# Patient Record
Sex: FEMALE | Race: ASIAN | NOT HISPANIC OR LATINO | Employment: OTHER | URBAN - METROPOLITAN AREA
[De-identification: names, ages, dates, MRNs, and addresses within clinical notes are randomized per-mention and may not be internally consistent; named-entity substitution may affect disease eponyms.]

---

## 2023-08-15 PROBLEM — R60.0 BILATERAL LEG EDEMA: Status: ACTIVE | Noted: 2023-08-15

## 2023-08-15 PROBLEM — K90.0 ADULT CELIAC DISEASE (HHS-HCC): Status: ACTIVE | Noted: 2023-08-15

## 2023-08-15 PROBLEM — H16.223 KERATITIS SICCA, BILATERAL: Status: ACTIVE | Noted: 2023-08-15

## 2023-08-15 PROBLEM — M35.01 KERATITIS SICCA, BILATERAL (MULTI): Status: ACTIVE | Noted: 2023-08-15

## 2023-08-15 PROBLEM — F17.210 CIGARETTE SMOKER: Status: ACTIVE | Noted: 2023-08-15

## 2023-08-15 PROBLEM — M05.79 RHEUMATOID ARTHRITIS INVOLVING MULTIPLE SITES WITH POSITIVE RHEUMATOID FACTOR (MULTI): Status: ACTIVE | Noted: 2023-08-15

## 2023-08-15 PROBLEM — R53.83 FATIGUE: Status: ACTIVE | Noted: 2023-08-15

## 2023-08-15 RX ORDER — ALBUTEROL SULFATE 90 UG/1
2 AEROSOL, METERED RESPIRATORY (INHALATION) EVERY 4 HOURS PRN
COMMUNITY
Start: 2020-04-08 | End: 2024-03-25 | Stop reason: WASHOUT

## 2023-08-16 ENCOUNTER — OFFICE VISIT (OUTPATIENT)
Dept: PRIMARY CARE | Facility: CLINIC | Age: 68
End: 2023-08-16
Payer: MEDICARE

## 2023-08-16 ENCOUNTER — LAB (OUTPATIENT)
Dept: LAB | Facility: LAB | Age: 68
End: 2023-08-16
Payer: MEDICARE

## 2023-08-16 VITALS
OXYGEN SATURATION: 98 % | WEIGHT: 149.6 LBS | DIASTOLIC BLOOD PRESSURE: 80 MMHG | SYSTOLIC BLOOD PRESSURE: 140 MMHG | HEIGHT: 60 IN | HEART RATE: 75 BPM | TEMPERATURE: 97.9 F | RESPIRATION RATE: 18 BRPM | BODY MASS INDEX: 29.37 KG/M2

## 2023-08-16 DIAGNOSIS — Z78.0 POST-MENOPAUSE: ICD-10-CM

## 2023-08-16 DIAGNOSIS — E78.00 HYPERCHOLESTEROLEMIA: ICD-10-CM

## 2023-08-16 DIAGNOSIS — E21.3 HYPERPARATHYROIDISM (MULTI): ICD-10-CM

## 2023-08-16 DIAGNOSIS — K51.311 ULCERATIVE RECTOSIGMOIDITIS WITH RECTAL BLEEDING (MULTI): ICD-10-CM

## 2023-08-16 DIAGNOSIS — R06.02 SOB (SHORTNESS OF BREATH): ICD-10-CM

## 2023-08-16 DIAGNOSIS — Z12.31 BREAST CANCER SCREENING BY MAMMOGRAM: ICD-10-CM

## 2023-08-16 DIAGNOSIS — I82.4Y9 DEEP VEIN THROMBOSIS (DVT) OF PROXIMAL LOWER EXTREMITY, UNSPECIFIED CHRONICITY, UNSPECIFIED LATERALITY (MULTI): ICD-10-CM

## 2023-08-16 DIAGNOSIS — R20.3 SENSITIVE SKIN: ICD-10-CM

## 2023-08-16 DIAGNOSIS — R73.9 ELEVATED BLOOD SUGAR: ICD-10-CM

## 2023-08-16 DIAGNOSIS — M05.79 RHEUMATOID ARTHRITIS INVOLVING MULTIPLE SITES WITH POSITIVE RHEUMATOID FACTOR (MULTI): ICD-10-CM

## 2023-08-16 DIAGNOSIS — R35.0 FREQUENT URINATION: ICD-10-CM

## 2023-08-16 DIAGNOSIS — I20.89 ANGINA AT REST (CMS-HCC): ICD-10-CM

## 2023-08-16 DIAGNOSIS — Z00.00 MEDICARE ANNUAL WELLNESS VISIT, SUBSEQUENT: Primary | ICD-10-CM

## 2023-08-16 LAB
ALANINE AMINOTRANSFERASE (SGPT) (U/L) IN SER/PLAS: 13 U/L (ref 7–45)
ALBUMIN (G/DL) IN SER/PLAS: 4 G/DL (ref 3.4–5)
ALKALINE PHOSPHATASE (U/L) IN SER/PLAS: 75 U/L (ref 33–136)
ANION GAP IN SER/PLAS: 13 MMOL/L (ref 10–20)
ASPARTATE AMINOTRANSFERASE (SGOT) (U/L) IN SER/PLAS: 16 U/L (ref 9–39)
BASOPHILS (10*3/UL) IN BLOOD BY AUTOMATED COUNT: 0.04 X10E9/L (ref 0–0.1)
BASOPHILS/100 LEUKOCYTES IN BLOOD BY AUTOMATED COUNT: 0.6 % (ref 0–2)
BILIRUBIN TOTAL (MG/DL) IN SER/PLAS: 0.6 MG/DL (ref 0–1.2)
CALCIUM (MG/DL) IN SER/PLAS: 9.4 MG/DL (ref 8.6–10.3)
CARBON DIOXIDE, TOTAL (MMOL/L) IN SER/PLAS: 25 MMOL/L (ref 21–32)
CHLORIDE (MMOL/L) IN SER/PLAS: 103 MMOL/L (ref 98–107)
CHOLESTEROL (MG/DL) IN SER/PLAS: 230 MG/DL (ref 0–199)
CHOLESTEROL IN HDL (MG/DL) IN SER/PLAS: 53.6 MG/DL
CHOLESTEROL/HDL RATIO: 4.3
CREATININE (MG/DL) IN SER/PLAS: 0.8 MG/DL (ref 0.5–1.05)
EOSINOPHILS (10*3/UL) IN BLOOD BY AUTOMATED COUNT: 0.12 X10E9/L (ref 0–0.7)
EOSINOPHILS/100 LEUKOCYTES IN BLOOD BY AUTOMATED COUNT: 1.9 % (ref 0–6)
ERYTHROCYTE DISTRIBUTION WIDTH (RATIO) BY AUTOMATED COUNT: 13.2 % (ref 11.5–14.5)
ERYTHROCYTE MEAN CORPUSCULAR HEMOGLOBIN CONCENTRATION (G/DL) BY AUTOMATED: 32.8 G/DL (ref 32–36)
ERYTHROCYTE MEAN CORPUSCULAR VOLUME (FL) BY AUTOMATED COUNT: 95 FL (ref 80–100)
ERYTHROCYTES (10*6/UL) IN BLOOD BY AUTOMATED COUNT: 4.33 X10E12/L (ref 4–5.2)
ESTIMATED AVERAGE GLUCOSE FOR HBA1C: 114 MG/DL
GFR FEMALE: 80 ML/MIN/1.73M2
GLUCOSE (MG/DL) IN SER/PLAS: 87 MG/DL (ref 74–99)
HEMATOCRIT (%) IN BLOOD BY AUTOMATED COUNT: 41.2 % (ref 36–46)
HEMOGLOBIN (G/DL) IN BLOOD: 13.5 G/DL (ref 12–16)
HEMOGLOBIN A1C/HEMOGLOBIN TOTAL IN BLOOD: 5.6 %
IMMATURE GRANULOCYTES/100 LEUKOCYTES IN BLOOD BY AUTOMATED COUNT: 0.3 % (ref 0–0.9)
LDL: 122 MG/DL (ref 0–99)
LEUKOCYTES (10*3/UL) IN BLOOD BY AUTOMATED COUNT: 6.4 X10E9/L (ref 4.4–11.3)
LYMPHOCYTES (10*3/UL) IN BLOOD BY AUTOMATED COUNT: 2.09 X10E9/L (ref 1.2–4.8)
LYMPHOCYTES/100 LEUKOCYTES IN BLOOD BY AUTOMATED COUNT: 32.8 % (ref 13–44)
MONOCYTES (10*3/UL) IN BLOOD BY AUTOMATED COUNT: 0.53 X10E9/L (ref 0.1–1)
MONOCYTES/100 LEUKOCYTES IN BLOOD BY AUTOMATED COUNT: 8.3 % (ref 2–10)
NEUTROPHILS (10*3/UL) IN BLOOD BY AUTOMATED COUNT: 3.57 X10E9/L (ref 1.2–7.7)
NEUTROPHILS/100 LEUKOCYTES IN BLOOD BY AUTOMATED COUNT: 56.1 % (ref 40–80)
NON HDL CHOLESTEROL: 176 MG/DL
PLATELETS (10*3/UL) IN BLOOD AUTOMATED COUNT: 239 X10E9/L (ref 150–450)
POTASSIUM (MMOL/L) IN SER/PLAS: 4.1 MMOL/L (ref 3.5–5.3)
PROTEIN TOTAL: 7.6 G/DL (ref 6.4–8.2)
SODIUM (MMOL/L) IN SER/PLAS: 137 MMOL/L (ref 136–145)
TRIGLYCERIDE (MG/DL) IN SER/PLAS: 272 MG/DL (ref 0–149)
UREA NITROGEN (MG/DL) IN SER/PLAS: 13 MG/DL (ref 6–23)
VLDL: 54 MG/DL (ref 0–40)

## 2023-08-16 PROCEDURE — 1036F TOBACCO NON-USER: CPT | Performed by: FAMILY MEDICINE

## 2023-08-16 PROCEDURE — 80061 LIPID PANEL: CPT

## 2023-08-16 PROCEDURE — 85025 COMPLETE CBC W/AUTO DIFF WBC: CPT

## 2023-08-16 PROCEDURE — 1159F MED LIST DOCD IN RCRD: CPT | Performed by: FAMILY MEDICINE

## 2023-08-16 PROCEDURE — 99213 OFFICE O/P EST LOW 20 MIN: CPT | Performed by: FAMILY MEDICINE

## 2023-08-16 PROCEDURE — 80053 COMPREHEN METABOLIC PANEL: CPT

## 2023-08-16 PROCEDURE — 1170F FXNL STATUS ASSESSED: CPT | Performed by: FAMILY MEDICINE

## 2023-08-16 PROCEDURE — 83036 HEMOGLOBIN GLYCOSYLATED A1C: CPT

## 2023-08-16 PROCEDURE — 36415 COLL VENOUS BLD VENIPUNCTURE: CPT

## 2023-08-16 PROCEDURE — 1160F RVW MEDS BY RX/DR IN RCRD: CPT | Performed by: FAMILY MEDICINE

## 2023-08-16 PROCEDURE — G0439 PPPS, SUBSEQ VISIT: HCPCS | Performed by: FAMILY MEDICINE

## 2023-08-16 ASSESSMENT — ACTIVITIES OF DAILY LIVING (ADL)
DOING_HOUSEWORK: INDEPENDENT
TAKING_MEDICATION: INDEPENDENT
MANAGING_FINANCES: INDEPENDENT
GROCERY_SHOPPING: INDEPENDENT
DRESSING: INDEPENDENT
BATHING: INDEPENDENT

## 2023-08-16 ASSESSMENT — PATIENT HEALTH QUESTIONNAIRE - PHQ9
2. FEELING DOWN, DEPRESSED OR HOPELESS: NOT AT ALL
SUM OF ALL RESPONSES TO PHQ9 QUESTIONS 1 AND 2: 0
1. LITTLE INTEREST OR PLEASURE IN DOING THINGS: NOT AT ALL

## 2023-08-16 NOTE — PROGRESS NOTES
Subjective   Patient ID: Leola Jennings is a 68 y.o. female who presents for Medicare Annual Wellness Visit Subsequent, Urinary Frequency, tembling, and Shortness of Breath.  HPI    AWV    Also presents in office today for her breathing. Admits that at times she does have a hard time catching her breath. Ongoing for a little while.    Patient admits that she did have covid back in 2020. Admits that since then she has not been able to dye her hair or wear make up. Admits that she will brake out into a rash. Would like to talk about this today.     Also presents in office for trembling. This happened once. Admits that when this was going on she did have her friend check her BP and it was 160/90. Since then it has been normal. Patients in office BP was 140/80.    Also presents in office today for her bladder. Ongoing since this weekend. Admits to increased urination.     Patient reports using inhaler as needed.  Patient reports shortness of breath.  She reports fatigue.    She reports exercising.   Patient reports history of thrombus.    ROS  Constitutional- No activity change. No appetite change.  Eyes- Denies vision changes.  Respiratory- No shortness of breath.  Cardiovascular- No palpitations. No chest pain.  GI- No nausea or vomiting. No diarrhea or constipation. Denies abdominal pain.  Musculoskeletal- Denies joint swelling.  Extremities- No edema.  Neurological- Denies headaches. Denies dizziness.  Skin- No rashes.  Psychiatric/Behavioral- Denies significant anxiety, or depressed mood.     Objective     /80   Pulse 75   Temp 36.6 °C (97.9 °F) (Temporal)   Resp 18   Ht 1.524 m (5')   Wt 67.9 kg (149 lb 9.6 oz)   SpO2 98%   BMI 29.22 kg/m²     Allergies   Allergen Reactions    Gluten Other    Mesalamine Other       Constitutional-- Well-nourished.  No distress  Head- unremarkable.  1  Eyes- PERRL.  Conjunctiva normal.  Nose- Normal.  No rhinorrhea noted.  Throat- Oropharynx is clear and  moist.  Neck- Supple with no thyromegaly.  No significant cervical adenopathy noted.  Pulmonary/Chest- Breath sounds normal with normal effort.  No wheezing.  Heart- Regular rate and rhythm.  No murmur.  Abdomen- Soft and non-tender.  No masses noted.  Musculoskeletal- Normal ROM.  No significant joint swelling  Extremities- No edema.   Neurological- Alert.  No noted deficits.  Skin- Warm.  No rashes.  Psychiatric/Behavioral- Mood and affect normal.  Behavior normal.     Assessment/Plan   1. Medicare annual wellness visit, subsequent        2. Frequent urination        3. Post-menopause  XR DEXA bone density    XR DEXA bone density      4. Breast cancer screening by mammogram  BI mammo bilateral screening tomosynthesis    BI mammo bilateral screening tomosynthesis      5. SOB (shortness of breath)        6. Sensitive skin        7. Ulcerative rectosigmoiditis with rectal bleeding (CMS/HCC)  CBC and Auto Differential    Comprehensive Metabolic Panel      8. Deep vein thrombosis (DVT) of proximal lower extremity, unspecified chronicity, unspecified laterality (CMS/HCC)        9. Hyperparathyroidism (CMS/HCC)        10. Angina at rest (CMS/HCC)        11. Rheumatoid arthritis involving multiple sites with positive rheumatoid factor (CMS/HCC)  CBC and Auto Differential    Comprehensive Metabolic Panel      12. Elevated blood sugar  CBC and Auto Differential    Comprehensive Metabolic Panel    Hemoglobin A1C      13. Hypercholesterolemia  Lipid Panel             Long talk. Treatment options reviewed.  Medicare wellness questionnaire reviewed in detail. Advanced Directives reviewed today. Patient advised to provide the office with a copy if has not already done so. No problems with activities of daily living. Falls risks reviewed.    Shortness of breath.   Frequent urination   Deep vein thrombosis.  Advised patient to wear compression stockings.   Complete blood work after fasting for 10 hours.    Order placed for  Mammogram.    Continue and take your medications as prescribed.    Health Maintenance issues discussed.    Importance of healthy diet and regular exercise regimen discussed.    We will contact you with any test results ordered. If you do not hear from us, please contact.    Follow-up as instructed or sooner if any problems or symptoms do not resolve as expected.    Scribe Attestation  By signing my name below, IGabriella Scribe   attest that this documentation has been prepared under the direction and in the presence of Stewart Mariee MD.

## 2023-12-19 ENCOUNTER — TELEPHONE (OUTPATIENT)
Dept: PRIMARY CARE | Facility: CLINIC | Age: 68
End: 2023-12-19
Payer: MEDICARE

## 2023-12-19 DIAGNOSIS — K51.311 ULCERATIVE RECTOSIGMOIDITIS WITH RECTAL BLEEDING (MULTI): Primary | ICD-10-CM

## 2023-12-19 RX ORDER — CIPROFLOXACIN 250 MG/1
250 TABLET, FILM COATED ORAL 2 TIMES DAILY
Qty: 14 TABLET | Refills: 0 | Status: SHIPPED | OUTPATIENT
Start: 2023-12-19 | End: 2023-12-26

## 2023-12-19 RX ORDER — METHYLPREDNISOLONE 4 MG/1
TABLET ORAL
Qty: 21 TABLET | Refills: 0 | Status: SHIPPED | OUTPATIENT
Start: 2023-12-19 | End: 2023-12-22 | Stop reason: SDUPTHER

## 2023-12-22 DIAGNOSIS — K51.311 ULCERATIVE RECTOSIGMOIDITIS WITH RECTAL BLEEDING (MULTI): ICD-10-CM

## 2023-12-22 RX ORDER — METHYLPREDNISOLONE 4 MG/1
TABLET ORAL
Qty: 21 TABLET | Refills: 0 | Status: SHIPPED | OUTPATIENT
Start: 2023-12-22 | End: 2024-01-03 | Stop reason: SDUPTHER

## 2023-12-22 NOTE — TELEPHONE ENCOUNTER
Please advise patient that Dr. Mariee is out of the office and I am helping to cover.  I have called in a Medrol Dosepak for her.  I would continue to follow Dr. Mariee's advice and if she is not significantly better I would have her call back next week.  She       Patient aware  
Patient is aware.     Stewart Mariee MD  [id number] [place] [id number] Clinical Support Staff10 minutes ago (9:58 AM)       Please let her know this is likely a flareup of her rectosigmoiditis.  I sent in a steroid pack along with an antibiotic.  Take as directed.  Call or follow-up next week if not significantly improved     
Pt calling back, She is still not feeling better. She is almost out of the prednisone that was prescribed on 12/19/23.     She was concerned that she would run out of medication. She wanted to know if another round could be called in?     Please advise     She is aware that Dr. Mariee is out of the office until Tuesday afternoon.   Pharmacy: [facility]  
Pt calling, she wanted to schedule a virtual visit with you. She states that she started having diarrhea about a week and half ago. She is now to the point that she has no control over her bowels. She is wearing depends diapers. Her arthritis has flared up as well, and she cannot make it to the bathroom in time. She states that blood and mucus is coming out.     She states that in the past you called in an antibiotic for this same problem and a steroid for her arthritis.     She has tried everything from the Brat diet to Pepto bismol and nothing seems to help.     I advised the patient that she may need to go to the urgent care.    Please advise    Pharmacy: [facility]  Last office visit: 8/16/23  
No

## 2023-12-30 ENCOUNTER — HOSPITAL ENCOUNTER (EMERGENCY)
Facility: HOSPITAL | Age: 68
Discharge: HOME | End: 2023-12-30
Payer: MEDICARE

## 2023-12-30 ENCOUNTER — APPOINTMENT (OUTPATIENT)
Dept: RADIOLOGY | Facility: HOSPITAL | Age: 68
End: 2023-12-30
Payer: MEDICARE

## 2023-12-30 VITALS
HEIGHT: 60 IN | SYSTOLIC BLOOD PRESSURE: 132 MMHG | OXYGEN SATURATION: 100 % | HEART RATE: 85 BPM | DIASTOLIC BLOOD PRESSURE: 65 MMHG | TEMPERATURE: 97.2 F | WEIGHT: 134 LBS | RESPIRATION RATE: 18 BRPM | BODY MASS INDEX: 26.31 KG/M2

## 2023-12-30 DIAGNOSIS — R19.7 DIARRHEA, UNSPECIFIED TYPE: Primary | ICD-10-CM

## 2023-12-30 LAB
ALBUMIN SERPL BCP-MCNC: 3.8 G/DL (ref 3.4–5)
ALP SERPL-CCNC: 81 U/L (ref 33–136)
ALT SERPL W P-5'-P-CCNC: 15 U/L (ref 7–45)
ANION GAP SERPL CALC-SCNC: 13 MMOL/L (ref 10–20)
AST SERPL W P-5'-P-CCNC: 13 U/L (ref 9–39)
BASOPHILS # BLD AUTO: 0.05 X10*3/UL (ref 0–0.1)
BASOPHILS NFR BLD AUTO: 0.5 %
BILIRUB DIRECT SERPL-MCNC: 0.1 MG/DL (ref 0–0.3)
BILIRUB SERPL-MCNC: 0.5 MG/DL (ref 0–1.2)
BUN SERPL-MCNC: 13 MG/DL (ref 6–23)
C DIF TOX TCDA+TCDB STL QL NAA+PROBE: NOT DETECTED
CALCIUM SERPL-MCNC: 8.5 MG/DL (ref 8.6–10.3)
CHLORIDE SERPL-SCNC: 104 MMOL/L (ref 98–107)
CO2 SERPL-SCNC: 25 MMOL/L (ref 21–32)
CREAT SERPL-MCNC: 0.75 MG/DL (ref 0.5–1.05)
EOSINOPHIL # BLD AUTO: 0.11 X10*3/UL (ref 0–0.7)
EOSINOPHIL NFR BLD AUTO: 1.1 %
ERYTHROCYTE [DISTWIDTH] IN BLOOD BY AUTOMATED COUNT: 15 % (ref 11.5–14.5)
GFR SERPL CREATININE-BSD FRML MDRD: 86 ML/MIN/1.73M*2
GLUCOSE SERPL-MCNC: 91 MG/DL (ref 74–99)
HCT VFR BLD AUTO: 39.3 % (ref 36–46)
HGB BLD-MCNC: 13.1 G/DL (ref 12–16)
IMM GRANULOCYTES # BLD AUTO: 0.09 X10*3/UL (ref 0–0.7)
IMM GRANULOCYTES NFR BLD AUTO: 0.9 % (ref 0–0.9)
INR PPP: 1 (ref 0.9–1.1)
LACTATE SERPL-SCNC: 1.4 MMOL/L (ref 0.4–2)
LIPASE SERPL-CCNC: 48 U/L (ref 9–82)
LYMPHOCYTES # BLD AUTO: 2.39 X10*3/UL (ref 1.2–4.8)
LYMPHOCYTES NFR BLD AUTO: 23.8 %
MCH RBC QN AUTO: 31 PG (ref 26–34)
MCHC RBC AUTO-ENTMCNC: 33.3 G/DL (ref 32–36)
MCV RBC AUTO: 93 FL (ref 80–100)
MONOCYTES # BLD AUTO: 0.87 X10*3/UL (ref 0.1–1)
MONOCYTES NFR BLD AUTO: 8.6 %
NEUTROPHILS # BLD AUTO: 6.55 X10*3/UL (ref 1.2–7.7)
NEUTROPHILS NFR BLD AUTO: 65.1 %
NRBC BLD-RTO: 0 /100 WBCS (ref 0–0)
PLATELET # BLD AUTO: 274 X10*3/UL (ref 150–450)
POTASSIUM SERPL-SCNC: 4 MMOL/L (ref 3.5–5.3)
PROT SERPL-MCNC: 7 G/DL (ref 6.4–8.2)
PROTHROMBIN TIME: 10.9 SECONDS (ref 9.8–12.8)
RBC # BLD AUTO: 4.22 X10*6/UL (ref 4–5.2)
SODIUM SERPL-SCNC: 138 MMOL/L (ref 136–145)
WBC # BLD AUTO: 10.1 X10*3/UL (ref 4.4–11.3)

## 2023-12-30 PROCEDURE — 80076 HEPATIC FUNCTION PANEL: CPT | Performed by: NURSE PRACTITIONER

## 2023-12-30 PROCEDURE — 99284 EMERGENCY DEPT VISIT MOD MDM: CPT

## 2023-12-30 PROCEDURE — 82374 ASSAY BLOOD CARBON DIOXIDE: CPT | Performed by: NURSE PRACTITIONER

## 2023-12-30 PROCEDURE — 2500000001 HC RX 250 WO HCPCS SELF ADMINISTERED DRUGS (ALT 637 FOR MEDICARE OP): Performed by: NURSE PRACTITIONER

## 2023-12-30 PROCEDURE — 83605 ASSAY OF LACTIC ACID: CPT | Performed by: NURSE PRACTITIONER

## 2023-12-30 PROCEDURE — 87493 C DIFF AMPLIFIED PROBE: CPT | Mod: 59 | Performed by: NURSE PRACTITIONER

## 2023-12-30 PROCEDURE — 87506 IADNA-DNA/RNA PROBE TQ 6-11: CPT | Mod: ELYLAB | Performed by: NURSE PRACTITIONER

## 2023-12-30 PROCEDURE — 2500000004 HC RX 250 GENERAL PHARMACY W/ HCPCS (ALT 636 FOR OP/ED): Performed by: NURSE PRACTITIONER

## 2023-12-30 PROCEDURE — 83690 ASSAY OF LIPASE: CPT | Performed by: NURSE PRACTITIONER

## 2023-12-30 PROCEDURE — 85025 COMPLETE CBC W/AUTO DIFF WBC: CPT | Performed by: NURSE PRACTITIONER

## 2023-12-30 PROCEDURE — 74177 CT ABD & PELVIS W/CONTRAST: CPT | Performed by: RADIOLOGY

## 2023-12-30 PROCEDURE — 85610 PROTHROMBIN TIME: CPT | Performed by: NURSE PRACTITIONER

## 2023-12-30 PROCEDURE — 96360 HYDRATION IV INFUSION INIT: CPT | Mod: 59

## 2023-12-30 PROCEDURE — 2550000001 HC RX 255 CONTRASTS: Performed by: NURSE PRACTITIONER

## 2023-12-30 PROCEDURE — 36415 COLL VENOUS BLD VENIPUNCTURE: CPT | Performed by: NURSE PRACTITIONER

## 2023-12-30 PROCEDURE — 74177 CT ABD & PELVIS W/CONTRAST: CPT

## 2023-12-30 RX ORDER — PREDNISONE 20 MG/1
40 TABLET ORAL DAILY
Qty: 10 TABLET | Refills: 0 | Status: SHIPPED | OUTPATIENT
Start: 2023-12-30 | End: 2024-01-04

## 2023-12-30 RX ORDER — DICYCLOMINE HYDROCHLORIDE 10 MG/1
20 CAPSULE ORAL ONCE
Status: COMPLETED | OUTPATIENT
Start: 2023-12-30 | End: 2023-12-30

## 2023-12-30 RX ORDER — DIPHENOXYLATE HYDROCHLORIDE AND ATROPINE SULFATE 2.5; .025 MG/1; MG/1
1 TABLET ORAL 4 TIMES DAILY PRN
Qty: 20 TABLET | Refills: 0 | Status: SHIPPED | OUTPATIENT
Start: 2023-12-30 | End: 2024-03-01 | Stop reason: ALTCHOICE

## 2023-12-30 RX ADMIN — DICYCLOMINE HYDROCHLORIDE 20 MG: 10 CAPSULE ORAL at 11:34

## 2023-12-30 RX ADMIN — IOHEXOL 75 ML: 350 INJECTION, SOLUTION INTRAVENOUS at 15:54

## 2023-12-30 RX ADMIN — SODIUM CHLORIDE 1000 ML: 9 INJECTION, SOLUTION INTRAVENOUS at 11:36

## 2023-12-30 ASSESSMENT — PAIN SCALES - GENERAL
PAINLEVEL_OUTOF10: 0 - NO PAIN
PAINLEVEL_OUTOF10: 8
PAINLEVEL_OUTOF10: 10 - WORST POSSIBLE PAIN
PAINLEVEL_OUTOF10: 0 - NO PAIN
PAINLEVEL_OUTOF10: 10 - WORST POSSIBLE PAIN
PAINLEVEL_OUTOF10: 10 - WORST POSSIBLE PAIN

## 2023-12-30 ASSESSMENT — LIFESTYLE VARIABLES
HAVE YOU EVER FELT YOU SHOULD CUT DOWN ON YOUR DRINKING: NO
EVER HAD A DRINK FIRST THING IN THE MORNING TO STEADY YOUR NERVES TO GET RID OF A HANGOVER: NO
REASON UNABLE TO ASSESS: NO
EVER FELT BAD OR GUILTY ABOUT YOUR DRINKING: NO
HAVE PEOPLE ANNOYED YOU BY CRITICIZING YOUR DRINKING: NO

## 2023-12-30 ASSESSMENT — PAIN - FUNCTIONAL ASSESSMENT
PAIN_FUNCTIONAL_ASSESSMENT: 0-10
PAIN_FUNCTIONAL_ASSESSMENT: 0-10

## 2023-12-30 ASSESSMENT — PAIN DESCRIPTION - ORIENTATION: ORIENTATION: RIGHT;LEFT;LOWER

## 2023-12-30 ASSESSMENT — PAIN DESCRIPTION - DESCRIPTORS: DESCRIPTORS: BURNING;ACHING;CRAMPING

## 2023-12-30 ASSESSMENT — COLUMBIA-SUICIDE SEVERITY RATING SCALE - C-SSRS
1. IN THE PAST MONTH, HAVE YOU WISHED YOU WERE DEAD OR WISHED YOU COULD GO TO SLEEP AND NOT WAKE UP?: NO
2. HAVE YOU ACTUALLY HAD ANY THOUGHTS OF KILLING YOURSELF?: NO
6. HAVE YOU EVER DONE ANYTHING, STARTED TO DO ANYTHING, OR PREPARED TO DO ANYTHING TO END YOUR LIFE?: NO

## 2023-12-30 ASSESSMENT — PAIN DESCRIPTION - FREQUENCY: FREQUENCY: CONSTANT/CONTINUOUS

## 2023-12-30 ASSESSMENT — PAIN DESCRIPTION - PAIN TYPE: TYPE: ACUTE PAIN

## 2023-12-30 ASSESSMENT — PAIN DESCRIPTION - LOCATION: LOCATION: ABDOMEN

## 2023-12-30 NOTE — ED PROVIDER NOTES
HPI   Chief Complaint   Patient presents with    Rectal Bleeding     X1 month, wearing depends/dripping,  10/10 pain, unable to get into PCP until mid Jan       68-year-old female presents emergency department, states she has been having abdominal discomfort, cramping, burning type pain and diarrhea.  Patient states symptoms started on 12/14, Was prescribed Medrol Dosepak by her primary care on 12/18.    Patient states previous similar episodes, notes she has had 2 previous episodes similar to this over the years, last one was approximately 4 years ago.  She has been worked up by GI multiple times.  Ultimately during the last workup she was thought to have ulcerative colitis.  The GI physician wanted to start her on antibiotic logics but she was concerned that the side effects and the strain it would cause in her immune system so she declined.  States she has been doing fine for the last 4 years until this month.    States symptoms are getting worse, describes uncontrollable diarrhea, burning type abdominal pain with cramping.  States she has been soiling herself the last few days.  She describes bloody, mucousy diarrhea      History provided by:  Patient   used: No                        [place] Coma Scale Score: 15                  Patient History   Past Medical History:   Diagnosis Date    Acute vaginitis 05/23/2019    Acute vaginitis    Celiac disease     Adult celiac disease    Encounter for general adult medical examination without abnormal findings 05/23/2019    Routine general medical examination at a health care facility    Encounter for screening for malignant neoplasm of colon 03/15/2021    Screen for colon cancer    Nonscarring hair loss, unspecified 08/09/2019    Hair loss    Personal history of other diseases of the digestive system 03/11/2020    History of rectal bleeding    Personal history of other diseases of the musculoskeletal system and connective tissue     History of  rheumatoid arthritis    Personal history of other diseases of the respiratory system 04/08/2020    History of bronchitis    Personal history of other diseases of the respiratory system 04/22/2020    History of upper respiratory infection    Personal history of other specified conditions 07/15/2019    History of abdominal pain     Past Surgical History:   Procedure Laterality Date    OTHER SURGICAL HISTORY  03/13/2017    Parathyroid    OTHER SURGICAL HISTORY  07/15/2019    Colonoscopy     No family history on file.  Social History     Tobacco Use    Smoking status: Never    Smokeless tobacco: Never   Substance Use Topics    Alcohol use: Not on file    Drug use: Not on file       Physical Exam   ED Triage Vitals [12/30/23 1038]   Temp Heart Rate Resp BP   36.2 °C (97.2 °F) 95 18 (!) 193/99      SpO2 Temp src Heart Rate Source Patient Position   98 % -- -- --      BP Location FiO2 (%)     -- --       Physical Exam  Physical Exam:  Constitutional: Vitals noted, no distress. Afebrile.   Cardiovascular: Regular, rate, rhythm, no murmur.   Pulmonary: Lungs clear bilaterally with good aeration. No adventitious breath sounds.   Gastrointestinal: Soft, nonsurgical.  Generalized discomfort throughout. No peritoneal signs. Normoactive bowel sounds.   Musculoskeletal: No peripheral edema. Negative Homans bilaterally, no cords.   Skin: No rash.   Neuro: No focal neurologic deficits, NIH score of 0.    ED Course & MDM   Diagnoses as of 12/30/23 1649   Diarrhea, unspecified type       Medical Decision Making  IV was established, patient was given Bentyl and IV fluids.    Laboratory workup was obtained, unremarkable CBC, metabolic panel, normal lactate, lipase.  Negative for C. difficile.  Sample sent for stool pathogen panel.    CT imaging was obtained of the abdomen/pelvis, overall unremarkable.    Discussed results with the patient, discussed trialing Lomotil at home, continuing with brat and bland diet.  Discussed proper  hydration.  She does have an appointment scheduled with primary care, also recommended close follow-up with GI.  Will also start on 5-day burst of prednisone as well.    Should closely monitor her symptoms, return with any worsening symptoms or any additional concerns.    Procedure  Procedures     Danyell Chicas, NATHAN-MAGALYS  12/30/23 [id number]

## 2023-12-30 NOTE — ED TRIAGE NOTES
"Pt to ED via walk for c/o bright red \"constant\" rectal bleeding x1 month.  Pt contacted PCP and was given 2 rounds of Prenisone, but unable to get appt until mid January.  Pt states she is \"dripping\" even between going to the bathroom and is now wearing a depends 24 hours.  Respirations even and unlabored.  No acute distress noted at this time.  Denies CP and SOB.  A&Ox4.  VSS.    "

## 2023-12-31 LAB

## 2024-01-02 ENCOUNTER — TELEPHONE (OUTPATIENT)
Dept: PRIMARY CARE | Facility: CLINIC | Age: 69
End: 2024-01-02
Payer: MEDICARE

## 2024-01-02 DIAGNOSIS — K51.311 ULCERATIVE RECTOSIGMOIDITIS WITH RECTAL BLEEDING (MULTI): ICD-10-CM

## 2024-01-02 NOTE — TELEPHONE ENCOUNTER
PATIENT CALLING, SHE ENDED UP IN THE ER OVER THE HOLIDAY WEEKEND FOR HER ULCERATIVE RECTOSIGMOIDITIS. SHE IS WANTING TO KNOW IF YOU CAN GIVE HER A CALL. SHE IS SCHEDULED TO SEE YOU ON 01/15/2024 BUT IS NOT SURE WHAT SHE SHOULD DO UNTIL THAT APPOINTMENT. PLEASE ADVISE.

## 2024-01-03 RX ORDER — METHYLPREDNISOLONE 4 MG/1
TABLET ORAL
Qty: 21 TABLET | Refills: 0 | Status: SHIPPED | OUTPATIENT
Start: 2024-01-03 | End: 2024-03-01 | Stop reason: ALTCHOICE

## 2024-01-04 ENCOUNTER — TELEPHONE (OUTPATIENT)
Dept: PRIMARY CARE | Facility: CLINIC | Age: 69
End: 2024-01-04
Payer: MEDICARE

## 2024-01-04 DIAGNOSIS — R19.7 DIARRHEA, UNSPECIFIED TYPE: ICD-10-CM

## 2024-01-04 RX ORDER — DIPHENOXYLATE HYDROCHLORIDE AND ATROPINE SULFATE 2.5; .025 MG/1; MG/1
1 TABLET ORAL 4 TIMES DAILY PRN
Qty: 30 TABLET | Refills: 0 | Status: SHIPPED | OUTPATIENT
Start: 2024-01-04 | End: 2024-01-12

## 2024-01-04 NOTE — TELEPHONE ENCOUNTER
Patient is aware.     Stewart Mariee MD  [id number] [place] [id number] Clinical Support Staff2 minutes ago (4:01 PM)       Please let her know I sent the Lomotil antidiarrheal medication into her drugstore.  I will see her tomorrow at her appointment.  Thanks

## 2024-01-04 NOTE — TELEPHONE ENCOUNTER
FYI      -CORRECTING VERBIAGE FOR PREVIOUS  ENCOUNTER ----  PATIENTS APPOINTMENT WAS CANCELLED.

## 2024-01-04 NOTE — TELEPHONE ENCOUNTER
PATIENT AWARE YOU ARE NOT IN OFFICE.  PATIENTS FEET /ANKLES ARE VERY SWOLLEN   SHE IS UNCOMFORTABLE AND IT HURTS TO WALK.  ANYTHING SHE CAN DO FOR THIS?  ALSO SHE WAS GIVEN LOMOTIL 2.5-0.025 MG AT THE HOSPITAL, SHE WILL NEED A REFILL ON THIS IF YOU WANT HER TO CONTINUE THAT MEDICATION.   [facility]   PLEASE ADVISE

## 2024-01-04 NOTE — TELEPHONE ENCOUNTER
"WE PUT HER IN YOUR 3PM TOMORROW SLOT   AND LAST APPOINTMENT WAS DOCUMENTED \"SCHEDULED\"   CORRECTION IS THE APPT IS CANCELLED\"     PLEASE ALSO NOTE PATIENT AWARE SHE IS TO COME IN AT 3 TOMORROW AND SHE IS ASKING FOR ANOTHER REFILL ON THE LOMOTIL 2.5 -0.025 BECAUSE HER DIARRHEA IS SLOWING BUT BARELY ANY BETTER  PLEASE ADVISE     "

## 2024-01-05 ENCOUNTER — OFFICE VISIT (OUTPATIENT)
Dept: PRIMARY CARE | Facility: CLINIC | Age: 69
End: 2024-01-05
Payer: MEDICARE

## 2024-01-05 VITALS
SYSTOLIC BLOOD PRESSURE: 126 MMHG | DIASTOLIC BLOOD PRESSURE: 78 MMHG | HEIGHT: 60 IN | TEMPERATURE: 97.9 F | BODY MASS INDEX: 26.39 KG/M2 | OXYGEN SATURATION: 98 % | HEART RATE: 70 BPM | WEIGHT: 134.4 LBS | RESPIRATION RATE: 18 BRPM

## 2024-01-05 DIAGNOSIS — I82.4Y9 DEEP VEIN THROMBOSIS (DVT) OF PROXIMAL LOWER EXTREMITY, UNSPECIFIED CHRONICITY, UNSPECIFIED LATERALITY (MULTI): ICD-10-CM

## 2024-01-05 DIAGNOSIS — E21.3 HYPERPARATHYROIDISM (MULTI): ICD-10-CM

## 2024-01-05 DIAGNOSIS — M05.79 RHEUMATOID ARTHRITIS INVOLVING MULTIPLE SITES WITH POSITIVE RHEUMATOID FACTOR (MULTI): ICD-10-CM

## 2024-01-05 DIAGNOSIS — M79.89 BILATERAL SWELLING OF FEET: ICD-10-CM

## 2024-01-05 DIAGNOSIS — Z12.11 COLON CANCER SCREENING: ICD-10-CM

## 2024-01-05 DIAGNOSIS — R19.7 BLOODY DIARRHEA: ICD-10-CM

## 2024-01-05 PROCEDURE — 1036F TOBACCO NON-USER: CPT | Performed by: FAMILY MEDICINE

## 2024-01-05 PROCEDURE — 1159F MED LIST DOCD IN RCRD: CPT | Performed by: FAMILY MEDICINE

## 2024-01-05 PROCEDURE — 1160F RVW MEDS BY RX/DR IN RCRD: CPT | Performed by: FAMILY MEDICINE

## 2024-01-05 PROCEDURE — 1125F AMNT PAIN NOTED PAIN PRSNT: CPT | Performed by: FAMILY MEDICINE

## 2024-01-05 PROCEDURE — 99213 OFFICE O/P EST LOW 20 MIN: CPT | Performed by: FAMILY MEDICINE

## 2024-01-05 RX ORDER — METRONIDAZOLE 250 MG/1
250 TABLET ORAL 2 TIMES DAILY
Qty: 14 TABLET | Refills: 0 | Status: SHIPPED | OUTPATIENT
Start: 2024-01-05 | End: 2024-01-12

## 2024-01-05 NOTE — PROGRESS NOTES
Subjective   Patient ID: Leola Jennings is a 68 y.o. female who presents for Foot Swelling and Rectal Bleeding.  HPI    Patient presents in office today for swelling in her feet. Ongoing since 1/3/23. Patient admits that she has been taking prednisone. This started after she was started on the medrol Dosepak. This is bilateral feet. Admits that she can get the swelling to go down but laying on her floor and putting her feet up.     Patient presents in office today for bloody diarrhea. This has been ongoing x 1 month. Patient admits that she has been to the [facility] ER on 12/30/23. Patient admits that she did have testing done while at the ER. Patient had BW and C. Difficile, PCR done. Patient also had CT abdomen pelvis with iv contrast. Patient was prescribed prednisone and lomotil. Patient admits that she has had 7 bloody stools since midnight. Patient is scheduled with Gastro CNP on 1/10/24. Patient will be seeing her over the phone.     Patient had a sigmoidoscopy done 7/19/19.     Taking current medications which were reviewed.  Problem list discussed.    Overall doing well.  Eating okay.  Staying active.    Has no other new problem /question.     ROS  Constitutional- No activity change. No appetite change.  Eyes- Denies vision changes.  Respiratory- No shortness of breath.  Cardiovascular- No palpitations. No chest pain.  GI- No nausea or vomiting. No diarrhea or constipation. Denies abdominal pain.  Musculoskeletal- Denies joint swelling.  Extremities- No edema.  Neurological- Denies headaches. Denies dizziness.  Skin- No rashes.  Psychiatric/Behavioral- Denies significant anxiety, or depressed mood.     Objective     /78   Pulse 70   Temp 36.6 °C (97.9 °F) (Temporal)   Resp 18   Ht 1.524 m (5')   Wt 61 kg (134 lb 6.4 oz)   SpO2 98%   BMI 26.25 kg/m²     No Known Allergies      Constitutional-- Well-nourished.  No distress  Head- unremarkable.  Eyes- PERRL.  Conjunctiva normal.  Nose- Normal.  No  rhinorrhea noted.  Throat- Oropharynx is clear and moist.  Neck- Supple with no thyromegaly.  No significant cervical adenopathy noted.  Pulmonary/Chest- Breath sounds normal with normal effort.  No wheezing.  Heart- Regular rate and rhythm.  No murmur.  Abdomen- Soft and non-tender.  No masses noted.  Musculoskeletal- Normal ROM.  No significant joint swelling  Extremities-trace leg swelling to above the ankles  Neurological- Alert.  No noted deficits.  Skin- Warm.  No rashes.  Psychiatric/Behavioral- Mood and affect normal.  Behavior normal.     Assessment/Plan   1. Bloody diarrhea  metroNIDAZOLE (Flagyl) 250 mg tablet      2. Bilateral swelling of feet        3. Colon cancer screening        4. Rheumatoid arthritis involving multiple sites with positive rheumatoid factor (CMS/HCC)        5. Deep vein thrombosis (DVT) of proximal lower extremity, unspecified chronicity, unspecified laterality (CMS/HCC)        6. Hyperparathyroidism (CMS/HCC)               Long talk. Treatment options reviewed.    Discussed rectal bleeding.  Discussed bloody stools.  Discussed related symptoms.  Take Metronidazole as discussed.  Continue to monitor.  GI referral potentially as discussed      Improved relatively controlled.  Arthritis stable    Advised patient to remain up to date on routine screening and health maintenance.      Continue and take your medications as prescribed.    Health Maintenance issues discussed.    Importance of healthy diet and regular exercise regimen discussed.    We will contact you with any test results ordered. If you do not hear from us, please contact.    Follow-up as instructed or sooner if any problems or symptoms do not resolve as expected.    Scribe Attestation  By signing my name below, Gabriella MONTE Scribe   attest that this documentation has been prepared under the direction and in the presence of Stewart Mariee MD.

## 2024-01-08 ENCOUNTER — TELEPHONE (OUTPATIENT)
Dept: PRIMARY CARE | Facility: CLINIC | Age: 69
End: 2024-01-08
Payer: MEDICARE

## 2024-01-08 NOTE — TELEPHONE ENCOUNTER
EDI:    Pt calling, she was in on Friday to see you. She wanted to let you know that she had significant progress over the weekend. She will continue to take the 3 medications that you prescribed.     She states that last night was the best night that she has had in several weeks. She only had one episode yesterday and there was only a small amount of bleeding. She will follow up with Gastro this Wednesday.

## 2024-01-10 ENCOUNTER — TELEMEDICINE (OUTPATIENT)
Dept: GASTROENTEROLOGY | Facility: CLINIC | Age: 69
End: 2024-01-10
Payer: MEDICARE

## 2024-01-10 DIAGNOSIS — K52.9 CHRONIC DIARRHEA: Primary | ICD-10-CM

## 2024-01-10 DIAGNOSIS — K51.90 ULCERATIVE COLITIS WITHOUT COMPLICATIONS, UNSPECIFIED LOCATION (MULTI): ICD-10-CM

## 2024-01-10 PROCEDURE — 99204 OFFICE O/P NEW MOD 45 MIN: CPT | Performed by: NURSE PRACTITIONER

## 2024-01-10 RX ORDER — POLYETHYLENE GLYCOL 3350, SODIUM SULFATE ANHYDROUS, SODIUM BICARBONATE, SODIUM CHLORIDE, POTASSIUM CHLORIDE 236; 22.74; 6.74; 5.86; 2.97 G/4L; G/4L; G/4L; G/4L; G/4L
4000 POWDER, FOR SOLUTION ORAL ONCE
Qty: 1 ML | Refills: 0 | Status: SHIPPED | OUTPATIENT
Start: 2024-01-10 | End: 2024-01-10

## 2024-01-10 RX ORDER — PREDNISONE 10 MG/1
TABLET ORAL
Qty: 129 TABLET | Refills: 0 | Status: SHIPPED | OUTPATIENT
Start: 2024-01-10 | End: 2024-05-09

## 2024-01-10 NOTE — PROGRESS NOTES
"Assessment/Plan   Leola Jennings is a 68 y.o. female who is being evaluated through a telephone visit for UC flare. She was last seen by GI in April, 2020.  Has been diagnosed with rectosigmoiditis UC with rectal bleeding, symptoms always improved with prednisone in the past.  She could not tolerate Lialda due to rash and lip swelling.  She could not tolerate Enbrel for RA in the remote past.  GI recommendation was for maintenance therapy for IBD.  Preferred Entyvio given its safety profile and age consideration; however, patient declined to start biologic therapy due to cost and fear of side effects.  Our infusion nurse was able to get the cost cut down to $5 per infusion but patient still declined to start biologic therapy.  Patient had a flexible sigmoidoscopy in July, 2019 inflammation from 20 cm in the rectosigmoid colon, biopsies confirmed chronic active colitis in this area.    Patient states she has had a flareup of her UC starting December 14, 2023.  Prior to that, she said she has been \"fine\" over the last 4 years in terms of UC symptoms. Starting December 14, 2023 patient c/o abdominal pain 10/10 and having diarrhea with no \"control over her bowels\".  She was wearing adult diapers.  Also said her arthritis had flared up and she could not make it to the bathroom in time.  Patient was having bloody diarrhea with mucus.  Too many bowel movements to count.  Tried brat diet and Pepto-Bismol which was ineffective.  Was given a short course of steroid taper 12/18/23 as well as an antibiotic by PCP.  Her symptoms did not improve within a week so patient went to the ER of [facility] to be evaluated.    ER workup: Stool sample for C. difficile and stool pathogen PCR both negative. CBC did not show leukocytosis or anemia.  CMP, lipase, and lactate unremarkable.  CT abdomen and pelvis with IV contrast showed no evidence of bowel obstruction, free intraperitoneal air or abnormal intra-abdominal fluid collection.  Mass " or lymphadenopathy identified.  Evidence of acute diverticulitis or appendicitis.     States symptoms are getting worse, describes uncontrollable diarrhea, burning type abdominal pain with cramping.  States she has been soiling herself the last few days.  She describes bloody, mucousy diarrhea. Patient was started on a 5-day burst of prednisone by ED, Lomotil was added for diarrhea and asked to follow-up with GI. Symptoms still persists.      ASSESSMENT/PLAN  Ulcerative rectosigmoiditis with rectal bleeding    Start long prednisone taper. Start 40 mg p.o. once daily x 1 week then decrease by 5 mg weekly.  Check fecal calprotectin, sed rate and CRP  Recommend colonoscopy to be done with Dr. Starla White prep  Patient informed she will need a  the day of the procedure  Follow-up with GI 2 weeks after colonoscopy to review results.  Follow-up with Dr. Mariee regarding lower leg swelling.  Swelling may be related to edema or side effects of steroids but will also need to rule out DVT as patient is high risk  2/2 inflammatory bowel disease.    Beverley New, NATHAN-MAGALYS      Subjective     History of Present Illness:   Leola Jennings is a 68 y.o. female who is being evaluated through a telephone visit today which was set up by central scheduling.  She was last seen by GI in April, 2020.  Has been diagnosed with rectosigmoiditis with rectal bleeding, symptoms always improved with prednisone in the past.  She could not tolerate Lialda due to rash and lip swelling.  She could not tolerate Enbrel for RA in the remote past.  GI recommendation was for maintenance therapy for IBD.  Preferred Entyvio given its safety profile and age consideration; however, patient declined to start biologic therapy due to cost and fear of side effects.  Our infusion nurse was able to get the cost cut down to $5 per infusion but patient still declined to start biologic therapy.    Patient states she has had a flareup of her UC starting  "December 14, 2023.  Prior to that, she said she has been \"fine\" over the last 4 years in terms of UC symptoms. Starting December 14, 2023 patient c/o abdominal pain 10/10 and having diarrhea with no \"control over her bowels\".  She was wearing adult diapers.  Also said her arthritis had flared up and she could not make it to the bathroom in time.  Patient was having bloody diarrhea with mucus.  Too many bowel movements to count.  Tried brat diet and Pepto-Bismol which was ineffective.  Was given a short course of steroid taper 12/18/23 as well as an antibiotic by PCP.  Her symptoms did not improve within a week so patient went to the ER of [facility] to be evaluated.    ER workup: Stool sample for C. difficile and stool pathogen PCR both negative. CBC did not show leukocytosis or anemia.  CMP, lipase, and lactate unremarkable.  CT abdomen and pelvis with IV contrast showed no evidence of bowel obstruction, free intraperitoneal air or abnormal intra-abdominal fluid collection.  Mass or lymphadenopathy identified.  Evidence of acute diverticulitis or appendicitis.     States symptoms are getting worse, describes uncontrollable diarrhea, burning type abdominal pain with cramping.  States she has been soiling herself the last few days.  She describes bloody, mucousy diarrhea. Patient was started on a 5-day burst of prednisone by ED, Lomotil was added for diarrhea and asked to follow-up with GI. Symptoms still persists.        4/20 OV:  Since her last office visit on 3/11/20, she started taking prednisone 40 mg, and has tapered now to 15 mg daily. The steroids have completely controlled the diarrhea. they are down from 10+ bowel movements a day to 1-2 day with occasional blood on the TP only. On 3/29, the patient presented to her PCP with shortness of breath, cough, nausea, sore throat- she was given a presumptive diagnosis of Covid 19, but was not tested. She was given Azithromycin, an albuterol inhaler, and Tessalon Perles. " She had a small uptick in her GI symptoms while on the azithromycin, but this has gotten better. Her cough is pretty persistent at night when she is still sleeping. She has noticed a swollen face on the prednisone.     She reports being on Enbrel for her rheumatoid arthritis remotely. he took this for approximately 1 year, but experienced many side effects including worsening of her vision. Eventually she stopped the Enbrel because of this.     Her divorce was recently finalized, and she will switch insurance on 5/1/20.     OV in 3/20:  Since her last visit in 7/19, she tapered off the steroids. Her bleeding and diarrhea had resolved on a steroid taper. She did start Lialda, but had a reaction to this, she developed a rash and her lips swelled. She discontinued the Lialda. She was doing well for a period of time. More recently, the diarrhea and rectal bleeding restarted. Her PCP gave her a prednisone taper for 10 days. Since she completed this therapy, she is having 11 watery stools per day, and over the past few days stools at night. She is seeing blood and mucus in the stool. She denies recent travel or antibiotic use.     OV in 7/19:  This is a 64-year-old female who was recently admitted to [facility] from 7/18/19-7/22/19 for 3 weeks of rectal bleeding (large amounts of BRB), lower abdominal pain and diarrhea (stools too numerous to count). A flexible sigmoidoscopy showed inflammation from 20 centimeters in the rectosigmoid colon. Biopsies confirmed chronic active colitis in this area.       Review of Systems  ROS Negative unless otherwise stated above.    Past Medical History   has a past medical history of Acute vaginitis (05/23/2019), Celiac disease, Encounter for general adult medical examination without abnormal findings (05/23/2019), Encounter for screening for malignant neoplasm of colon (03/15/2021), Nonscarring hair loss, unspecified (08/09/2019), Personal history of other diseases of the digestive system  (03/11/2020), Personal history of other diseases of the musculoskeletal system and connective tissue, Personal history of other diseases of the respiratory system (04/08/2020), Personal history of other diseases of the respiratory system (04/22/2020), and Personal history of other specified conditions (07/15/2019).     Social History   reports that she has never smoked. She has never used smokeless tobacco.     Family History  family history is not on file.     Allergies  No Known Allergies    Medications  Current Outpatient Medications   Medication Instructions    albuterol 90 mcg/actuation inhaler 2 puffs, inhalation, Every 4 hours PRN    diphenoxylate-atropine (Lomotil) 2.5-0.025 mg tablet 1 tablet, oral, 4 times daily PRN    diphenoxylate-atropine (Lomotil) 2.5-0.025 mg tablet 1 tablet, oral, 4 times daily PRN    methylPREDNISolone (Medrol Dospak) 4 mg tablets Take as directed on package.    metroNIDAZOLE (FLAGYL) 250 mg, oral, 2 times daily    predniSONE (Deltasone) 10 mg tablet Take 4 tablets (40 mg) by mouth once daily for 60 days, THEN 4 tablets (40 mg) once daily. 40 mg daily x 7 days, 35 mg daily x 7 days, 30 mg daily x 7 days, 25 mg daily x 7 days, 20 mg daily x 7 days, 15 mg daily x 7 days, 10 mg p.o. daily x 7 days, 5 mg daily x 7 days then stop.        Objective   Telephone visit    General: A&Ox3, NAD.  Neuro: No focal deficits.   Psych: Normal mood and affect.     Lab Results   Component Value Date    WBC 10.1 12/30/2023    WBC 6.4 08/16/2023    WBC 13.2 (H) 07/22/2019    HGB 13.1 12/30/2023    HGB 13.5 08/16/2023    HGB 12.1 07/22/2019    HCT 39.3 12/30/2023    HCT 41.2 08/16/2023    HCT 36.5 07/22/2019     12/30/2023     08/16/2023     07/22/2019     Lab Results   Component Value Date     12/30/2023    K 4.0 12/30/2023     12/30/2023    CO2 25 12/30/2023    BUN 13 12/30/2023    CREATININE 0.75 12/30/2023    GLUCOSE 91 12/30/2023    CALCIUM 8.5 (L) 12/30/2023        Lab Results   Component Value Date    ALKPHOS 81 12/30/2023    ALKPHOS 75 08/16/2023    ALKPHOS 66 07/15/2019    BILITOT 0.5 12/30/2023    BILITOT 0.6 08/16/2023    BILITOT 0.6 07/15/2019    BILIDIR 0.1 12/30/2023    PROT 7.0 12/30/2023    PROT 7.6 08/16/2023    PROT 7.4 07/15/2019    ALT 15 12/30/2023    ALT 13 08/16/2023    ALT 16 07/15/2019    AST 13 12/30/2023    AST 16 08/16/2023    AST 11 07/15/2019      Lab Results   Component Value Date    INR 1.0 12/30/2023

## 2024-01-11 ENCOUNTER — TELEPHONE (OUTPATIENT)
Dept: GASTROENTEROLOGY | Facility: CLINIC | Age: 69
End: 2024-01-11
Payer: MEDICARE

## 2024-01-11 NOTE — TELEPHONE ENCOUNTER
----- Message from LUCILA Singh sent at 1/10/2024  4:20 PM EST -----  Please call patient tomorrow to schedule colonoscopy to be done with Dr. Cha.  Follow-up with me 2 weeks after colonoscopy.

## 2024-01-12 ENCOUNTER — LAB (OUTPATIENT)
Dept: LAB | Facility: LAB | Age: 69
End: 2024-01-12
Payer: MEDICARE

## 2024-01-12 ENCOUNTER — TELEPHONE (OUTPATIENT)
Dept: GASTROENTEROLOGY | Facility: CLINIC | Age: 69
End: 2024-01-12
Payer: MEDICARE

## 2024-01-12 DIAGNOSIS — K52.9 CHRONIC DIARRHEA: ICD-10-CM

## 2024-01-12 DIAGNOSIS — K51.90 ULCERATIVE COLITIS WITHOUT COMPLICATIONS, UNSPECIFIED LOCATION (MULTI): ICD-10-CM

## 2024-01-12 LAB
CRP SERPL-MCNC: 7.49 MG/DL
ERYTHROCYTE [SEDIMENTATION RATE] IN BLOOD BY WESTERGREN METHOD: 28 MM/H (ref 0–30)

## 2024-01-12 PROCEDURE — 83993 ASSAY FOR CALPROTECTIN FECAL: CPT

## 2024-01-12 PROCEDURE — 85652 RBC SED RATE AUTOMATED: CPT

## 2024-01-12 PROCEDURE — 36415 COLL VENOUS BLD VENIPUNCTURE: CPT

## 2024-01-12 PROCEDURE — 86140 C-REACTIVE PROTEIN: CPT

## 2024-01-12 NOTE — TELEPHONE ENCOUNTER
Patient called back and is scheduled for colonoscopy on 2/28/2024 with Dr. Cha @ [facility].

## 2024-01-15 ENCOUNTER — APPOINTMENT (OUTPATIENT)
Dept: PRIMARY CARE | Facility: CLINIC | Age: 69
End: 2024-01-15
Payer: MEDICARE

## 2024-01-16 LAB — CALPROTECTIN STL-MCNT: 2100 UG/G

## 2024-01-17 ENCOUNTER — TELEPHONE (OUTPATIENT)
Dept: PRIMARY CARE | Facility: CLINIC | Age: 69
End: 2024-01-17
Payer: MEDICARE

## 2024-01-17 DIAGNOSIS — K52.9 INFLAMMATORY BOWEL DISEASE: Primary | ICD-10-CM

## 2024-01-17 DIAGNOSIS — Z29.11 ENCOUNTER FOR PROPHYLACTIC IMMUNOTHERAPY FOR RESPIRATORY SYNCYTIAL VIRUS (RSV): ICD-10-CM

## 2024-01-17 NOTE — TELEPHONE ENCOUNTER
Patient is calling because she saw you last on 1/5/24 for foot swelling and rectal bleeding. She was referred to the gastro doctor, Beverley New, she ordered labs and gave her a rx for Prednisone 40mg and things are coming along with that. She did have a good amount of swelling in her lower legs, ankles and feet. She wanted to refer her back to you. She's been doing all the things she's supposed to be doing, elevating her legs, wearing compression socks, doing ankle pumps and light walking. She states her left leg seems to be normal . The right leg continues to get bigger each day, it's about double the size when she saw you last on 1/5/24. She's having difficulty walking, can't drive because she has a clutch. She wanted to know if there's anything else she should be doing.  Please advise  Thanks      Patient's # [phone]    Preferred pharmacy [facility]

## 2024-01-23 NOTE — TELEPHONE ENCOUNTER
Bloody diarrhea seems to be resolving  Swelling still present and is difficult to walk and even drive  Pt currently on Prednisone (started on 1/12/2024) and it has been giving her some side effects like blurry vision  Did let Beverley Boswell NP in with Dr. Cha be aware as well.  Is wearing compression stockings and is doing exercises for swelling.  Is decreasing salt intake.  Thinks she is improving, just slowly.  Is set up for colonoscopy at the end of February  Pt is thinking of returning to Dr. Black and see what he may think of her situation.

## 2024-01-29 ENCOUNTER — TELEPHONE (OUTPATIENT)
Dept: PRIMARY CARE | Facility: CLINIC | Age: 69
End: 2024-01-29
Payer: MEDICARE

## 2024-01-29 DIAGNOSIS — K51.311 ULCERATIVE RECTOSIGMOIDITIS WITH RECTAL BLEEDING (MULTI): Primary | ICD-10-CM

## 2024-01-29 NOTE — TELEPHONE ENCOUNTER
Stewart Mariee MD  Do [id number] Clerical1 hour ago (12:47 PM)       Okay for GI referral.  Please arrange to the one she is requesting.  Thanks       Referral has been created and faxed to Dr. Santa's office.

## 2024-01-29 NOTE — TELEPHONE ENCOUNTER
Pt calling, she is requesting a GI referral. She states that she can get into a Gi Specialist at [facility], Dr. Santa.     Fax referral to [phone]. She would like this faxed asap as she wants to schedule with Yvonne Santa this week    Please advise

## 2024-01-30 ENCOUNTER — TELEPHONE (OUTPATIENT)
Dept: PRIMARY CARE | Facility: CLINIC | Age: 69
End: 2024-01-30
Payer: MEDICARE

## 2024-01-30 ENCOUNTER — OFFICE VISIT (OUTPATIENT)
Dept: GASTROENTEROLOGY | Age: 69
End: 2024-01-30
Payer: MEDICARE

## 2024-01-30 VITALS
DIASTOLIC BLOOD PRESSURE: 88 MMHG | SYSTOLIC BLOOD PRESSURE: 128 MMHG | BODY MASS INDEX: 25.91 KG/M2 | HEIGHT: 60 IN | WEIGHT: 132 LBS

## 2024-01-30 DIAGNOSIS — K51.80 OTHER ULCERATIVE COLITIS WITHOUT COMPLICATION (HCC): Primary | ICD-10-CM

## 2024-01-30 PROCEDURE — 1123F ACP DISCUSS/DSCN MKR DOCD: CPT | Performed by: INTERNAL MEDICINE

## 2024-01-30 PROCEDURE — 99204 OFFICE O/P NEW MOD 45 MIN: CPT | Performed by: INTERNAL MEDICINE

## 2024-01-30 RX ORDER — DIPHENOXYLATE HYDROCHLORIDE AND ATROPINE SULFATE 2.5; .025 MG/1; MG/1
TABLET ORAL
COMMUNITY

## 2024-01-30 RX ORDER — PREDNISONE 10 MG/1
TABLET ORAL
COMMUNITY
Start: 2024-01-10 | End: 2024-05-09

## 2024-01-30 NOTE — PROGRESS NOTES
Gastroenterology Clinic Visit    Mary Zhou  [id number]  Chief Complaint   Patient presents with    New Patient     HPI: 68 y.o. female presents to the clinic with history of ulcerative colitis.  Patient has been following up at [facility] until last week when she was seen in the GI clinic for recurrence of rectal bleeding and diarrhea.  Patient reports symptoms started by having rectal bleeding around 11 December 2023.  She started having diarrhea a few days later.  Was seen and evaluated by her primary care provider started on Cipro and Flagyl which did not change her symptoms.  She was then started on a short course of steroids which was repeated with no improvement in symptoms.  She presented to the emergency room where she had stool studies that did not show any evidence for infection including C. difficile.  Her labs showed elevated CRP at 7.4 and calprotectin at 2100.  Patient with history of left-sided ulcerative colitis, being followed at [facility] for the last 4 years after her diagnosis 4 years ago.    Patient currently on a long course of steroids, started at 40 mg once daily, currently taking 30 mg daily with 5 mg taper every week.  Switches to 25 mg tomorrow.  She reports currently having only 6-7 bowel movements compared to more than 25 bowel movements at the start of the symptoms.  She denies any blood in the stool.  Anticipates that she is going to be back to her normal bowel routine in 2 to 3 weeks.  Patient reports having flareups once every 3 to 4 years.  As noted below has been reluctant to take any medication since she reacts adversely to most medications.    She was evaluated by Amber Cowan on 1/10/2024 by a telephone visit.  HPI assessment and plan summarized below from her telephone visit.  \" Subjective   History of Present Illness:   Mary Zhou is a 68 y.o. female who is being evaluated through a telephone visit today which was set up by [employer]

## 2024-01-30 NOTE — TELEPHONE ENCOUNTER
Patient is calling with an FYI. She wanted to update you on the referral for gastro, Dr Santa with [facility]. She was able to see him today, 1/30/24, he squeezed her in today. He has agreed to see her. She states he doesn't recommend that she get any testing, colonoscopy at this time. She has a follow up visit with Dr Santa on 3/18/24 and she will be taking Prednisone that he gave her until the colitis symptoms are gone. After that he will decide if he wants to order more blood work or if he changes his mind and wants to order a colonoscopy. She's not due for a colonoscopy until 2028.    FYI      Patient's # [phone]

## 2024-02-26 ENCOUNTER — TELEPHONE (OUTPATIENT)
Dept: PRIMARY CARE | Facility: CLINIC | Age: 69
End: 2024-02-26
Payer: MEDICARE

## 2024-02-26 NOTE — TELEPHONE ENCOUNTER
PATIENT CALLING TO GIVE YOU AN UPDATE ON THE DECREASING OF THE PREDNISONE.    SHE STATES THAT EVERYTHING IS GOING GOOD WITH THE DECREASING OF THE PREDNISONE FOR HER COLITIS BUT SHE IS STILL HAVING ISSUES WITH HER LEG SWELLING. SHE STATES THAT IT SEEMS WORSE THAN WHEN SHE SAW YOU LAST.    SHE WOULD LIKE TO KNOW IF YOU WANT TO SEE HER IN THE OFFICE OR IF THERE IS ANYTHING ELSE YOU CAN RECOMMEND.    ALSO WANTED YOU TO KNOW THAT SHE FOLLOWS UP WITH GI TOMORROW.

## 2024-02-26 NOTE — TELEPHONE ENCOUNTER
Pt aware. She scheduled an appointment on Friday. She stated that the Gastroenterologist wasn't addressing the swelling from the prednisone. They referred her back to her family doctor

## 2024-02-27 ENCOUNTER — TELEPHONE (OUTPATIENT)
Dept: GASTROENTEROLOGY | Age: 69
End: 2024-02-27

## 2024-02-27 ENCOUNTER — OFFICE VISIT (OUTPATIENT)
Dept: GASTROENTEROLOGY | Age: 69
End: 2024-02-27
Payer: MEDICARE

## 2024-02-27 VITALS — WEIGHT: 137 LBS | HEIGHT: 60 IN | OXYGEN SATURATION: 98 % | HEART RATE: 99 BPM | BODY MASS INDEX: 26.9 KG/M2

## 2024-02-27 DIAGNOSIS — K51.80 OTHER ULCERATIVE COLITIS WITHOUT COMPLICATION (HCC): Primary | ICD-10-CM

## 2024-02-27 DIAGNOSIS — R10.9 ABDOMINAL CRAMPING: ICD-10-CM

## 2024-02-27 DIAGNOSIS — R15.2 DEFECATION URGENCY: ICD-10-CM

## 2024-02-27 PROCEDURE — 99213 OFFICE O/P EST LOW 20 MIN: CPT | Performed by: NURSE PRACTITIONER

## 2024-02-27 PROCEDURE — 1123F ACP DISCUSS/DSCN MKR DOCD: CPT | Performed by: NURSE PRACTITIONER

## 2024-02-27 NOTE — TELEPHONE ENCOUNTER
Patient would like you to send the Aprisio to her pharmacy ([facility] Patient would also like you to call her she has some questions regarding her prednisone

## 2024-02-27 NOTE — PROGRESS NOTES
Gastroenterology Clinic Follow up Visit    Mary Zhou  [id number]  Chief Complaint   Patient presents with    Follow-up       HPI: 68 y.o. female following up after last GI clinic on 1/30/2024     Interval change: Patient is follow-up to GI clinic for continued signs and symptoms of diarrhea and hematochezia.  Patient has history of ulcerative colitis since July 2019.  Patient has been seen at  gastroenterology.  She has recently been treated with 2 rounds of Medrol Dosepak and is continued on a long steroid taper, currently at 10 mg daily.  Patient has been followed up by PCP for bilateral lower leg edema.  She has noted the edema started after taking prednisone, follow-up appointment scheduled with Dr. Miller next week.   Patient reports having history of celiac disease, diverticulitis, and ulcerative colitis.  In the past she has taken prednisone taper with relief of symptoms.  Patient states that she does not want to take any Biologics.  She reports that she was offered medication including Biologics at  and refused to take.  She does report having 9-12 loose bowel movements daily with streaks of blood.  She also reports having nocturnal symptoms 1-2 times daily.  She does endorse having intermittent abdominal cramping prior to bowel movements, and is self resolving after bowel movement.  In previous charting it is noted that patient has allergy to Lialda causing rash and lip swelling.    HPI from last GI clinic visit on 1/30/2024 summarized below:   68 y.o. female presents to the clinic with history of ulcerative colitis.  Patient has been following up at [facility] until last week when she was seen in the GI clinic for recurrence of rectal bleeding and diarrhea.  Patient reports symptoms started by having rectal bleeding around 11 December 2023.  She started having diarrhea a few days later.  Was seen and evaluated by her primary care provider started on Cipro and Flagyl which did not change

## 2024-02-28 NOTE — PROGRESS NOTES
Subjective   Patient ID: Leola Jennings is a 68 y.o. female who presents for Medicare Annual Wellness Visit Subsequent and Leg Swelling.  HPI  Patient presents today for a Medicare AWV.    Also complaining of bilateral lower leg, ankle, and feet swelling x 1 month. It is unchanged. States it's been present since starting Prednisone. States every week when the Prednisone dose changes, she ends up with more side effects. This is prescribed by GI.      Taking current medications which were reviewed.  Problem list discussed.    Overall doing well.  Eating okay.  Staying active.    Has no other new problem /question.    ROS  Constitutional- No activity change. No appetite change.  Eyes- Denies vision changes.  Respiratory- No shortness of breath.  Cardiovascular- No palpitations. No chest pain.  GI- No nausea or vomiting. No diarrhea or constipation. Denies abdominal pain.  Musculoskeletal- Denies joint swelling.  Neurological- Denies headaches. Denies dizziness.  Skin- No rashes.  Psychiatric/Behavioral- Denies significant anxiety, or depressed mood.     Objective     /78   Pulse 102   Temp 36.6 °C (97.8 °F)   Resp 16   Ht 1.524 m (5')   Wt 60.4 kg (133 lb 3.2 oz)   LMP  (LMP Unknown)   SpO2 97%   BMI 26.01 kg/m²     Allergies   Allergen Reactions    Coconut Oil, Hydrogenated Rash       Constitutional-- Well-nourished.  No distress  Head- unremarkable.  Eyes- PERRL.  Conjunctiva normal.  Nose- Normal.  No rhinorrhea noted.  Throat- Oropharynx is clear and moist.  Neck- Supple with no thyromegaly.  No significant cervical adenopathy noted.  Pulmonary/Chest- Breath sounds normal with normal effort.  No wheezing.  Heart- Regular rate and rhythm.  No murmur.  Abdomen- Soft and non-tender.  No masses noted.  Musculoskeletal- Normal ROM.  No significant joint swelling  Extremities-2+ edema to above the ankles.  Most of the swelling was in her feet  Neurological- Alert.  No noted deficits.  Skin- Warm.    Psychiatric/Behavioral- Mood and affect normal.     Assessment/Plan   1. Medicare annual wellness visit, subsequent        2. Leg swelling  furosemide (Lasix) 20 mg tablet    Comprehensive Metabolic Panel    CANCELED: Basic Metabolic Panel      3. Rheumatoid arthritis involving multiple sites with positive rheumatoid factor (CMS/HCC)  Comprehensive Metabolic Panel    CANCELED: Basic Metabolic Panel      4. Hyperparathyroidism (CMS/[facility])        5. Ulcerative rectosigmoiditis with rectal bleeding (CMS/[facility])        6. BMI 26.0-26.9,adult        7. Visit for screening mammogram  BI mammo bilateral screening tomosynthesis      8. Adult celiac disease  Comprehensive Metabolic Panel             Long talk. Treatment options reviewed.    Medicare wellness questionnaire reviewed in detail.   No problems with activities of daily living. Home safety issues reviewed.   Advanced care planning discussed with patient.  Reminded to have an updated will if needed.    Reviewed most recent lab work with patient. Advised patient to remain up to date on routine maintenance and health screening.      Leg swelling likely related to her steroid use as well as some venous stasis changes.  Patient is weaning off on prednisone.  Will start Lasix to be taken daily for 3 days then every Monday Wednesday and Friday.  She will get labs on Monday or Tuesday of next week and follow-up with me on Wednesday    Continue and take your medications as prescribed.    Health Maintenance issues discussed.    Importance of healthy diet and regular exercise regimen discussed.    We will contact you with any test results ordered. If you do not hear from us, please contact.    Follow-up as instructed or sooner if any problems or symptoms do not resolve as expected.        Scribe Attestation  By signing my name below, IGabriella Scribe   attest that this documentation has been prepared under the direction and in the presence of Stewart Mariee MD.

## 2024-02-29 NOTE — TELEPHONE ENCOUNTER
Spoke with patient regarding medications.  Yoandy sent to pharmacy yesterday.    Patient will proceed as discussed.

## 2024-03-01 ENCOUNTER — OFFICE VISIT (OUTPATIENT)
Dept: PRIMARY CARE | Facility: CLINIC | Age: 69
End: 2024-03-01
Payer: MEDICARE

## 2024-03-01 VITALS
HEIGHT: 60 IN | BODY MASS INDEX: 26.15 KG/M2 | RESPIRATION RATE: 16 BRPM | HEART RATE: 102 BPM | DIASTOLIC BLOOD PRESSURE: 78 MMHG | SYSTOLIC BLOOD PRESSURE: 134 MMHG | OXYGEN SATURATION: 97 % | WEIGHT: 133.2 LBS | TEMPERATURE: 97.8 F

## 2024-03-01 DIAGNOSIS — K90.0 ADULT CELIAC DISEASE (HHS-HCC): ICD-10-CM

## 2024-03-01 DIAGNOSIS — K52.9 CHRONIC DIARRHEA: ICD-10-CM

## 2024-03-01 DIAGNOSIS — M79.89 LEG SWELLING: ICD-10-CM

## 2024-03-01 DIAGNOSIS — M05.79 RHEUMATOID ARTHRITIS INVOLVING MULTIPLE SITES WITH POSITIVE RHEUMATOID FACTOR (MULTI): ICD-10-CM

## 2024-03-01 DIAGNOSIS — K51.311 ULCERATIVE RECTOSIGMOIDITIS WITH RECTAL BLEEDING (MULTI): ICD-10-CM

## 2024-03-01 DIAGNOSIS — Z00.00 MEDICARE ANNUAL WELLNESS VISIT, SUBSEQUENT: Primary | ICD-10-CM

## 2024-03-01 DIAGNOSIS — Z12.31 VISIT FOR SCREENING MAMMOGRAM: ICD-10-CM

## 2024-03-01 DIAGNOSIS — K51.90 ULCERATIVE COLITIS WITHOUT COMPLICATIONS, UNSPECIFIED LOCATION (MULTI): ICD-10-CM

## 2024-03-01 DIAGNOSIS — E21.3 HYPERPARATHYROIDISM (MULTI): ICD-10-CM

## 2024-03-01 PROCEDURE — 1036F TOBACCO NON-USER: CPT | Performed by: FAMILY MEDICINE

## 2024-03-01 PROCEDURE — 3008F BODY MASS INDEX DOCD: CPT | Performed by: FAMILY MEDICINE

## 2024-03-01 PROCEDURE — 99213 OFFICE O/P EST LOW 20 MIN: CPT | Performed by: FAMILY MEDICINE

## 2024-03-01 PROCEDURE — 1124F ACP DISCUSS-NO DSCNMKR DOCD: CPT | Performed by: FAMILY MEDICINE

## 2024-03-01 PROCEDURE — 1160F RVW MEDS BY RX/DR IN RCRD: CPT | Performed by: FAMILY MEDICINE

## 2024-03-01 PROCEDURE — 1125F AMNT PAIN NOTED PAIN PRSNT: CPT | Performed by: FAMILY MEDICINE

## 2024-03-01 PROCEDURE — G0439 PPPS, SUBSEQ VISIT: HCPCS | Performed by: FAMILY MEDICINE

## 2024-03-01 PROCEDURE — 1159F MED LIST DOCD IN RCRD: CPT | Performed by: FAMILY MEDICINE

## 2024-03-01 PROCEDURE — 1170F FXNL STATUS ASSESSED: CPT | Performed by: FAMILY MEDICINE

## 2024-03-01 RX ORDER — FUROSEMIDE 20 MG/1
20 TABLET ORAL DAILY
Qty: 30 TABLET | Refills: 1 | Status: SHIPPED | OUTPATIENT
Start: 2024-03-01 | End: 2025-03-01

## 2024-03-01 ASSESSMENT — ACTIVITIES OF DAILY LIVING (ADL)
BATHING: INDEPENDENT
MANAGING_FINANCES: INDEPENDENT
GROCERY_SHOPPING: INDEPENDENT
TAKING_MEDICATION: INDEPENDENT
DRESSING: INDEPENDENT
DOING_HOUSEWORK: INDEPENDENT

## 2024-03-01 ASSESSMENT — PATIENT HEALTH QUESTIONNAIRE - PHQ9
1. LITTLE INTEREST OR PLEASURE IN DOING THINGS: NOT AT ALL
2. FEELING DOWN, DEPRESSED OR HOPELESS: NOT AT ALL
SUM OF ALL RESPONSES TO PHQ9 QUESTIONS 1 AND 2: 0

## 2024-03-05 ENCOUNTER — LAB (OUTPATIENT)
Dept: LAB | Facility: LAB | Age: 69
End: 2024-03-05
Payer: MEDICARE

## 2024-03-05 DIAGNOSIS — M05.79 RHEUMATOID ARTHRITIS INVOLVING MULTIPLE SITES WITH POSITIVE RHEUMATOID FACTOR (MULTI): ICD-10-CM

## 2024-03-05 DIAGNOSIS — K90.0 ADULT CELIAC DISEASE (HHS-HCC): ICD-10-CM

## 2024-03-05 DIAGNOSIS — M79.89 LEG SWELLING: ICD-10-CM

## 2024-03-05 LAB
ALBUMIN SERPL BCP-MCNC: 3.9 G/DL (ref 3.4–5)
ALP SERPL-CCNC: 73 U/L (ref 33–136)
ALT SERPL W P-5'-P-CCNC: 10 U/L (ref 7–45)
ANION GAP SERPL CALC-SCNC: 14 MMOL/L (ref 10–20)
AST SERPL W P-5'-P-CCNC: 12 U/L (ref 9–39)
BILIRUB SERPL-MCNC: 0.4 MG/DL (ref 0–1.2)
BUN SERPL-MCNC: 11 MG/DL (ref 6–23)
CALCIUM SERPL-MCNC: 9.1 MG/DL (ref 8.6–10.3)
CHLORIDE SERPL-SCNC: 102 MMOL/L (ref 98–107)
CO2 SERPL-SCNC: 25 MMOL/L (ref 21–32)
CREAT SERPL-MCNC: 0.9 MG/DL (ref 0.5–1.05)
EGFRCR SERPLBLD CKD-EPI 2021: 69 ML/MIN/1.73M*2
GLUCOSE SERPL-MCNC: 105 MG/DL (ref 74–99)
POTASSIUM SERPL-SCNC: 4.4 MMOL/L (ref 3.5–5.3)
PROT SERPL-MCNC: 7.1 G/DL (ref 6.4–8.2)
SODIUM SERPL-SCNC: 137 MMOL/L (ref 136–145)

## 2024-03-05 PROCEDURE — 80053 COMPREHEN METABOLIC PANEL: CPT

## 2024-03-05 PROCEDURE — 36415 COLL VENOUS BLD VENIPUNCTURE: CPT

## 2024-03-05 RX ORDER — PREDNISONE 20 MG/1
20 TABLET ORAL DAILY
Qty: 5 TABLET | Refills: 0 | Status: SHIPPED | OUTPATIENT
Start: 2024-03-05 | End: 2024-03-10

## 2024-03-05 RX ORDER — MESALAMINE 0.38 G/1
1.5 CAPSULE, EXTENDED RELEASE ORAL DAILY
Qty: 120 CAPSULE | Refills: 3 | Status: SHIPPED | OUTPATIENT
Start: 2024-03-05

## 2024-03-05 NOTE — PROGRESS NOTES
Medications sent, patient to take 1 tablet of Aprisio daily for 3 days and watch for any adverse reaction.

## 2024-03-06 RX ORDER — PREDNISONE 10 MG/1
TABLET ORAL
Qty: 129 TABLET | Refills: 0 | OUTPATIENT
Start: 2024-03-06

## 2024-03-06 NOTE — TELEPHONE ENCOUNTER
Patient has been seen by Dr. Kiet Cooper and requested referral to [facility] for 2nd (or 3rd) opinion. She can follow up with Dr. Santa. She will need to be seen by  GI in office, preferably with physician for any further treatment plan, refills, etc.

## 2024-03-07 ENCOUNTER — TELEPHONE (OUTPATIENT)
Dept: PRIMARY CARE | Facility: CLINIC | Age: 69
End: 2024-03-07
Payer: MEDICARE

## 2024-03-07 DIAGNOSIS — M79.89 LEG SWELLING: ICD-10-CM

## 2024-03-07 DIAGNOSIS — E78.00 HYPERCHOLESTEROLEMIA: ICD-10-CM

## 2024-03-12 ENCOUNTER — APPOINTMENT (OUTPATIENT)
Dept: GASTROENTEROLOGY | Facility: CLINIC | Age: 69
End: 2024-03-12
Payer: MEDICARE

## 2024-03-12 ENCOUNTER — TELEPHONE (OUTPATIENT)
Dept: PRIMARY CARE | Facility: CLINIC | Age: 69
End: 2024-03-12

## 2024-03-12 NOTE — TELEPHONE ENCOUNTER
----- Message from Leola Jennings sent at 3/12/2024  1:07 PM EDT -----  Regarding: EDI   Contact: [phone]  I've come down with a severe neck pain, headache & chills thing. Unsure if this is my body's response to the different meds I've been on the past several months. Or something else that needs to be addressed. The pain is quite intense. I'm setup to have labs done March 15 & a followup with you . Have one of the ladies call if I should be seen before then.  Kimberly Jennings  : [birth year]  Ph: [phone]

## 2024-03-15 ENCOUNTER — LAB (OUTPATIENT)
Dept: LAB | Facility: LAB | Age: 69
End: 2024-03-15
Payer: MEDICARE

## 2024-03-15 DIAGNOSIS — E78.00 HYPERCHOLESTEROLEMIA: ICD-10-CM

## 2024-03-15 DIAGNOSIS — M79.89 LEG SWELLING: ICD-10-CM

## 2024-03-15 LAB
ANION GAP SERPL CALC-SCNC: 13 MMOL/L (ref 10–20)
BUN SERPL-MCNC: 6 MG/DL (ref 6–23)
CALCIUM SERPL-MCNC: 9 MG/DL (ref 8.6–10.3)
CHLORIDE SERPL-SCNC: 99 MMOL/L (ref 98–107)
CO2 SERPL-SCNC: 24 MMOL/L (ref 21–32)
CREAT SERPL-MCNC: 0.95 MG/DL (ref 0.5–1.05)
EGFRCR SERPLBLD CKD-EPI 2021: 65 ML/MIN/1.73M*2
GLUCOSE SERPL-MCNC: 96 MG/DL (ref 74–99)
POTASSIUM SERPL-SCNC: 3.1 MMOL/L (ref 3.5–5.3)
SODIUM SERPL-SCNC: 133 MMOL/L (ref 136–145)

## 2024-03-15 PROCEDURE — 36415 COLL VENOUS BLD VENIPUNCTURE: CPT

## 2024-03-15 PROCEDURE — 80048 BASIC METABOLIC PNL TOTAL CA: CPT

## 2024-03-25 ENCOUNTER — OFFICE VISIT (OUTPATIENT)
Dept: PRIMARY CARE | Facility: CLINIC | Age: 69
End: 2024-03-25
Payer: MEDICARE

## 2024-03-25 VITALS
SYSTOLIC BLOOD PRESSURE: 118 MMHG | OXYGEN SATURATION: 98 % | DIASTOLIC BLOOD PRESSURE: 58 MMHG | RESPIRATION RATE: 16 BRPM | TEMPERATURE: 97.7 F | BODY MASS INDEX: 25.72 KG/M2 | HEART RATE: 90 BPM | HEIGHT: 60 IN | WEIGHT: 131 LBS

## 2024-03-25 DIAGNOSIS — K90.0 ADULT CELIAC DISEASE (HHS-HCC): ICD-10-CM

## 2024-03-25 DIAGNOSIS — M05.79 RHEUMATOID ARTHRITIS INVOLVING MULTIPLE SITES WITH POSITIVE RHEUMATOID FACTOR (MULTI): ICD-10-CM

## 2024-03-25 DIAGNOSIS — R60.0 BILATERAL LEG EDEMA: ICD-10-CM

## 2024-03-25 DIAGNOSIS — I20.89 ANGINA AT REST (CMS-HCC): ICD-10-CM

## 2024-03-25 DIAGNOSIS — E87.6 HYPOKALEMIA: Primary | ICD-10-CM

## 2024-03-25 DIAGNOSIS — R53.83 FATIGUE, UNSPECIFIED TYPE: ICD-10-CM

## 2024-03-25 DIAGNOSIS — E21.3 HYPERPARATHYROIDISM (MULTI): ICD-10-CM

## 2024-03-25 PROCEDURE — 3008F BODY MASS INDEX DOCD: CPT | Performed by: FAMILY MEDICINE

## 2024-03-25 PROCEDURE — 1160F RVW MEDS BY RX/DR IN RCRD: CPT | Performed by: FAMILY MEDICINE

## 2024-03-25 PROCEDURE — 1159F MED LIST DOCD IN RCRD: CPT | Performed by: FAMILY MEDICINE

## 2024-03-25 PROCEDURE — 99213 OFFICE O/P EST LOW 20 MIN: CPT | Performed by: FAMILY MEDICINE

## 2024-03-25 PROCEDURE — 1036F TOBACCO NON-USER: CPT | Performed by: FAMILY MEDICINE

## 2024-03-25 PROCEDURE — 1124F ACP DISCUSS-NO DSCNMKR DOCD: CPT | Performed by: FAMILY MEDICINE

## 2024-03-25 RX ORDER — MESALAMINE 0.38 G/1
1.5 CAPSULE, EXTENDED RELEASE ORAL DAILY
COMMUNITY
Start: 2024-03-05

## 2024-03-25 RX ORDER — POTASSIUM CHLORIDE 750 MG/1
10 CAPSULE, EXTENDED RELEASE ORAL DAILY
Qty: 30 CAPSULE | Refills: 1 | Status: SHIPPED | OUTPATIENT
Start: 2024-03-25 | End: 2024-05-24

## 2024-03-25 NOTE — PROGRESS NOTES
Subjective   Patient ID: Leola Jennings is a 68 y.o. female who presents for Results and Edema.  HPI  Patient presents today fore a follow-up on blood work done on 3/5/24 and 315/24    Pt states that she is still having swelling in both leg and feet since last office visit but it is definitely reduced.  Has lost a couple pounds.  Pt is seeing a Gastro on Wednesday      Taking current medications which were reviewed.  Problem list discussed.     Overall doing better  Eating okay.  Staying active.     Has no other new problem /question.  ROS  Constitutional- No activity change. No appetite change.  Eyes- Denies vision changes.  Respiratory- No shortness of breath.  Cardiovascular- No palpitations. No chest pain.  GI- No nausea or vomiting. No diarrhea or constipation. Denies abdominal pain.  Musculoskeletal- Denies joint swelling.  Neurological- Denies headaches. Denies dizziness.  Skin- No rashes.  Psychiatric/Behavioral- Denies significant anxiety, or depressed mood.     Objective     /58 (BP Location: Left arm, Patient Position: Sitting, BP Cuff Size: Adult)   Pulse 90   Temp 36.5 °C (97.7 °F) (Temporal)   Resp 16   Ht 1.524 m (5')   Wt 59.4 kg (131 lb)   LMP  (LMP Unknown)   SpO2 98%   BMI 25.58 kg/m²     Allergies   Allergen Reactions    Coconut Oil, Hydrogenated Rash       Constitutional-- Well-nourished.  No distress  Eyes- PERRL.  Conjunctiva normal.  Nose- Normal.  No rhinorrhea noted.  Throat- Oropharynx is clear and moist.  Neck- Supple with no thyromegaly.  No significant cervical adenopathy noted.  Pulmonary/Chest- Breath sounds normal with normal effort.  No wheezing.  Heart- Regular rate and rhythm.  No murmur.  Abdomen- Soft and non-tender.  No masses noted.  Musculoskeletal- Normal ROM.  No significant joint swelling  Extremities-  trace leg swelling to above the ankles  Neurological- Alert.  No noted deficits.  Skin- Warm.  No rashes.  Psychiatric/Behavioral- Mood and affect normal.   Behavior normal.     Assessment/Plan   1. Hypokalemia  Basic Metabolic Panel    potassium chloride ER (Micro-K) 10 mEq ER capsule      2. Angina at rest        3. Hyperparathyroidism (CMS/HCC)        4. Adult celiac disease  Basic Metabolic Panel    CBC and Auto Differential      5. Rheumatoid arthritis involving multiple sites with positive rheumatoid factor (CMS/HCC)  Basic Metabolic Panel    CBC and Auto Differential      6. Fatigue, unspecified type        7. BMI 25.0-25.9,adult        8. Bilateral leg edema               Long talk. Treatment options reviewed.    Reviewed most recent lab work with patient. Advised patient to remain up to date on routine maintenance and health screening.  Maintain appointments with specialists as scheduled.    Discussed low potassium.  Start potassium supplementation.  Discussed Edema.  Take Furosemide two days per week.  Will take Tuesdays and Fridays    Discussed supplemental potassium.      Advised patient to complete lab work in 3 weeks.  Will continue to monitor potassium levels.      Continue and take your medications as prescribed.    Health Maintenance issues discussed.    Importance of healthy diet and regular exercise regimen discussed.    We will contact you with any test results ordered. If you do not hear from us, please contact.    Follow-up 5/2024 as instructed or sooner if any problems or symptoms do not resolve as expected.          Scribe Attestation  By signing my name below, Gabriella MONTE Scribe   attest that this documentation has been prepared under the direction and in the presence of Stewart Mariee MD.

## 2024-03-28 ENCOUNTER — OFFICE VISIT (OUTPATIENT)
Dept: GASTROENTEROLOGY | Age: 69
End: 2024-03-28
Payer: MEDICARE

## 2024-03-28 VITALS — OXYGEN SATURATION: 99 % | HEART RATE: 99 BPM | HEIGHT: 60 IN | WEIGHT: 129 LBS | BODY MASS INDEX: 25.32 KG/M2

## 2024-03-28 DIAGNOSIS — R15.2 DEFECATION URGENCY: ICD-10-CM

## 2024-03-28 DIAGNOSIS — K51.80 OTHER ULCERATIVE COLITIS WITHOUT COMPLICATION (HCC): Primary | ICD-10-CM

## 2024-03-28 DIAGNOSIS — R10.9 ABDOMINAL CRAMPING: ICD-10-CM

## 2024-03-28 PROCEDURE — 1123F ACP DISCUSS/DSCN MKR DOCD: CPT | Performed by: NURSE PRACTITIONER

## 2024-03-28 PROCEDURE — 99213 OFFICE O/P EST LOW 20 MIN: CPT | Performed by: NURSE PRACTITIONER

## 2024-03-28 RX ORDER — FUROSEMIDE 20 MG/1
20 TABLET ORAL
COMMUNITY
Start: 2024-03-01 | End: 2025-03-01

## 2024-03-28 RX ORDER — POTASSIUM CHLORIDE 750 MG/1
10 CAPSULE, EXTENDED RELEASE ORAL DAILY
COMMUNITY
Start: 2024-03-25 | End: 2024-05-24

## 2024-03-28 NOTE — PROGRESS NOTES
Gastroenterology Clinic Follow up Visit    Mary Zhou  [id number]  Chief Complaint   Patient presents with    Follow-up       HPI: 68 y.o. female following up after last GI clinic on 2/27/2024     Interval change: Patient has started Aprisio 1 tablet daily without any adverse reaction.  Patient continues to research biologic options as discussed at last appointment and previously with gastroenterology at .  Patient refuses treatment with Biologics due to history of worsening symptoms when on biologic therapy many years ago for her rheumatoid arthritis.  Patient began taking Apriso, 0.375 g due to previous history of possible facial swelling with mesalamine medications in the past.  Patient denies any adverse reaction after 1 week.  Patient completed prednisone taper to to 3 weeks ago.  And has been seen by Dr. Miller due to lower leg edema and electrolyte imbalance.  Patient continues to have 10-15 loose to watery bowel movements daily noting streaks of blood with stools.  She continues to have intermittent abdominal cramping around bowel movements.    HPI from last GI clinic visit on 2/27/2024 summarized below:  Patient is follow-up to GI clinic for continued signs and symptoms of diarrhea and hematochezia.  Patient has history of ulcerative colitis since July 2019.  Patient has been seen at  gastroenterology.  She has recently been treated with 2 rounds of Medrol Dosepak and is continued on a long steroid taper, currently at 10 mg daily.  Patient has been followed up by PCP for bilateral lower leg edema.  She has noted the edema started after taking prednisone, follow-up appointment scheduled with Dr. Miller next week.   Patient reports having history of celiac disease, diverticulitis, and ulcerative colitis.  In the past she has taken prednisone taper with relief of symptoms. Patient states that she does not want to take any Biologics.  She reports that she was offered medication including Biologics at

## 2024-03-29 ENCOUNTER — TELEPHONE (OUTPATIENT)
Dept: GASTROENTEROLOGY | Age: 69
End: 2024-03-29

## 2024-03-29 NOTE — TELEPHONE ENCOUNTER
Patient called stating she can't afford the Apriso. Patient was given a number yesterday but isn't eligible to receive help. Please advise

## 2024-04-12 ENCOUNTER — TELEPHONE (OUTPATIENT)
Dept: PRIMARY CARE | Facility: CLINIC | Age: 69
End: 2024-04-12
Payer: MEDICARE

## 2024-04-12 DIAGNOSIS — E78.00 HYPERCHOLESTEROLEMIA: ICD-10-CM

## 2024-04-12 NOTE — TELEPHONE ENCOUNTER
Ordered. Patient made aware via MyChart.      Stewart Mariee MD  Do [id number] [facility] Clinical [facility] Staff1 hour ago (12:31 PM)       Please change the BMP to a CMP.  Then let her know.  Thank you

## 2024-04-12 NOTE — TELEPHONE ENCOUNTER
Can we change BMP to CMP? Please advise.      ----- Message from Leola Jennings sent at 4/12/2024 11:35 AM EDT -----  Regarding: Labwork to be  done wk of 4-15-24  Contact: [phone]  Please add tests to check my kidney & liver functions to the ones you've already ordered above. I want to make sure the Apriso isn't hurting my organs. I'll be going to the Lab on Thurs 4-18-24 or Fri 4-19-24. Thank you!  Kimberly Jennings

## 2024-04-18 ENCOUNTER — LAB (OUTPATIENT)
Dept: LAB | Facility: LAB | Age: 69
End: 2024-04-18
Payer: MEDICARE

## 2024-04-18 DIAGNOSIS — M05.79 RHEUMATOID ARTHRITIS INVOLVING MULTIPLE SITES WITH POSITIVE RHEUMATOID FACTOR (MULTI): ICD-10-CM

## 2024-04-18 DIAGNOSIS — D64.9 ANEMIA, UNSPECIFIED TYPE: Primary | ICD-10-CM

## 2024-04-18 DIAGNOSIS — K90.0 ADULT CELIAC DISEASE (HHS-HCC): ICD-10-CM

## 2024-04-18 DIAGNOSIS — E78.00 HYPERCHOLESTEROLEMIA: ICD-10-CM

## 2024-04-18 LAB
ALBUMIN SERPL BCP-MCNC: 3.8 G/DL (ref 3.4–5)
ALP SERPL-CCNC: 64 U/L (ref 33–136)
ALT SERPL W P-5'-P-CCNC: 8 U/L (ref 7–45)
ANION GAP SERPL CALC-SCNC: 12 MMOL/L (ref 10–20)
AST SERPL W P-5'-P-CCNC: 11 U/L (ref 9–39)
BASOPHILS # BLD AUTO: 0.04 X10*3/UL (ref 0–0.1)
BASOPHILS NFR BLD AUTO: 0.5 %
BILIRUB SERPL-MCNC: 0.3 MG/DL (ref 0–1.2)
BUN SERPL-MCNC: 15 MG/DL (ref 6–23)
CALCIUM SERPL-MCNC: 8.8 MG/DL (ref 8.6–10.3)
CHLORIDE SERPL-SCNC: 105 MMOL/L (ref 98–107)
CO2 SERPL-SCNC: 23 MMOL/L (ref 21–32)
CREAT SERPL-MCNC: 0.8 MG/DL (ref 0.5–1.05)
EGFRCR SERPLBLD CKD-EPI 2021: 80 ML/MIN/1.73M*2
EOSINOPHIL # BLD AUTO: 0.43 X10*3/UL (ref 0–0.7)
EOSINOPHIL NFR BLD AUTO: 5 %
ERYTHROCYTE [DISTWIDTH] IN BLOOD BY AUTOMATED COUNT: 16.6 % (ref 11.5–14.5)
GLUCOSE SERPL-MCNC: 102 MG/DL (ref 74–99)
HCT VFR BLD AUTO: 29.6 % (ref 36–46)
HGB BLD-MCNC: 8.5 G/DL (ref 12–16)
IMM GRANULOCYTES # BLD AUTO: 0.04 X10*3/UL (ref 0–0.7)
IMM GRANULOCYTES NFR BLD AUTO: 0.5 % (ref 0–0.9)
LYMPHOCYTES # BLD AUTO: 2.98 X10*3/UL (ref 1.2–4.8)
LYMPHOCYTES NFR BLD AUTO: 34.6 %
MCH RBC QN AUTO: 25.1 PG (ref 26–34)
MCHC RBC AUTO-ENTMCNC: 28.7 G/DL (ref 32–36)
MCV RBC AUTO: 88 FL (ref 80–100)
MONOCYTES # BLD AUTO: 0.77 X10*3/UL (ref 0.1–1)
MONOCYTES NFR BLD AUTO: 8.9 %
NEUTROPHILS # BLD AUTO: 4.35 X10*3/UL (ref 1.2–7.7)
NEUTROPHILS NFR BLD AUTO: 50.5 %
NRBC BLD-RTO: 0 /100 WBCS (ref 0–0)
PLATELET # BLD AUTO: 480 X10*3/UL (ref 150–450)
POTASSIUM SERPL-SCNC: 4.1 MMOL/L (ref 3.5–5.3)
PROT SERPL-MCNC: 7.1 G/DL (ref 6.4–8.2)
RBC # BLD AUTO: 3.38 X10*6/UL (ref 4–5.2)
SODIUM SERPL-SCNC: 136 MMOL/L (ref 136–145)
WBC # BLD AUTO: 8.6 X10*3/UL (ref 4.4–11.3)

## 2024-04-18 PROCEDURE — 85025 COMPLETE CBC W/AUTO DIFF WBC: CPT

## 2024-04-18 PROCEDURE — 36415 COLL VENOUS BLD VENIPUNCTURE: CPT

## 2024-04-18 PROCEDURE — 80053 COMPREHEN METABOLIC PANEL: CPT

## 2024-04-19 RX ORDER — IRON POLYSACCHARIDE COMPLEX 150 MG
150 CAPSULE ORAL DAILY
Qty: 30 CAPSULE | Refills: 2 | Status: SHIPPED | OUTPATIENT
Start: 2024-04-19

## 2024-04-25 ENCOUNTER — LAB (OUTPATIENT)
Dept: LAB | Facility: LAB | Age: 69
End: 2024-04-25
Payer: MEDICARE

## 2024-04-25 DIAGNOSIS — K90.0 ADULT CELIAC DISEASE (HHS-HCC): ICD-10-CM

## 2024-04-25 DIAGNOSIS — D64.9 ANEMIA, UNSPECIFIED TYPE: ICD-10-CM

## 2024-04-25 LAB
BASOPHILS # BLD AUTO: 0.06 X10*3/UL (ref 0–0.1)
BASOPHILS NFR BLD AUTO: 0.6 %
EOSINOPHIL # BLD AUTO: 0.39 X10*3/UL (ref 0–0.7)
EOSINOPHIL NFR BLD AUTO: 4 %
ERYTHROCYTE [DISTWIDTH] IN BLOOD BY AUTOMATED COUNT: 17 % (ref 11.5–14.5)
HCT VFR BLD AUTO: 29.8 % (ref 36–46)
HGB BLD-MCNC: 8.3 G/DL (ref 12–16)
IMM GRANULOCYTES # BLD AUTO: 0.04 X10*3/UL (ref 0–0.7)
IMM GRANULOCYTES NFR BLD AUTO: 0.4 % (ref 0–0.9)
IRON SERPL-MCNC: 14 UG/DL (ref 35–150)
LYMPHOCYTES # BLD AUTO: 3.47 X10*3/UL (ref 1.2–4.8)
LYMPHOCYTES NFR BLD AUTO: 35.4 %
MCH RBC QN AUTO: 23.9 PG (ref 26–34)
MCHC RBC AUTO-ENTMCNC: 27.9 G/DL (ref 32–36)
MCV RBC AUTO: 86 FL (ref 80–100)
MONOCYTES # BLD AUTO: 0.83 X10*3/UL (ref 0.1–1)
MONOCYTES NFR BLD AUTO: 8.5 %
NEUTROPHILS # BLD AUTO: 5 X10*3/UL (ref 1.2–7.7)
NEUTROPHILS NFR BLD AUTO: 51.1 %
NRBC BLD-RTO: 0 /100 WBCS (ref 0–0)
PLATELET # BLD AUTO: 459 X10*3/UL (ref 150–450)
RBC # BLD AUTO: 3.47 X10*6/UL (ref 4–5.2)
WBC # BLD AUTO: 9.8 X10*3/UL (ref 4.4–11.3)

## 2024-04-25 PROCEDURE — 85025 COMPLETE CBC W/AUTO DIFF WBC: CPT

## 2024-04-25 PROCEDURE — 83540 ASSAY OF IRON: CPT

## 2024-04-25 PROCEDURE — 36415 COLL VENOUS BLD VENIPUNCTURE: CPT

## 2024-04-26 ENCOUNTER — TELEPHONE (OUTPATIENT)
Dept: PRIMARY CARE | Facility: CLINIC | Age: 69
End: 2024-04-26
Payer: MEDICARE

## 2024-04-26 DIAGNOSIS — E61.1 LOW IRON: ICD-10-CM

## 2024-04-26 DIAGNOSIS — K90.0 ADULT CELIAC DISEASE (HHS-HCC): ICD-10-CM

## 2024-04-26 NOTE — TELEPHONE ENCOUNTER
I called and spoke to patient to maker her aware. The patient stated her understanding.     She has an upcoming apt with Dr. Santa's office 5/9/2024. She will call to see if she can be sooner.     Since she has seen GI. They prescribed Apriso. This has been helpful with her diarrhea. She was only going 3-4 x per day,     She noticed when taking the iron she had an upset stomach. Per the medication bottle, she was able to eat 1 piece of bread with her iron. She has been eating only 1 piece of toast with her iron.     Please advise if okay to continue eating the toast with her iron     When starting the potassium, and the iron she started having increased watery diarrhea to close to 10 x per day.

## 2024-04-26 NOTE — TELEPHONE ENCOUNTER
----- Message from Stewart Mariee MD sent at 4/26/2024  8:43 AM EDT -----  Red blood cell count remains about the same as last week.  Iron level is low.  Have her increase her poly iron pill to twice a day on an empty stomach.  Arrange for repeat CBC and iron level in 1 week.  Also please arrange for GI referral as soon as possible as this may be the cause of her low red blood cells.  She may need an upper and lower endoscopy.  Please arrange for both and let her know.  Thanks

## 2024-04-29 NOTE — TELEPHONE ENCOUNTER
Patient is aware.       Stewart Mariee MD  Do [id number] [facility] Clinical Support Staff3 hours ago (7:28 AM)       Please let her know that it is okay to take the iron with her toast.  As far as the watery diarrhea it is not something common with potassium and iron.  Iron tends to be more constipating.  This may have more to do with your GI situation and would recommend contacting your GI doctor Dr. Santa.  If you have any issues with that please let us know.  Thanks

## 2024-05-02 ENCOUNTER — LAB (OUTPATIENT)
Dept: LAB | Facility: LAB | Age: 69
End: 2024-05-02
Payer: MEDICARE

## 2024-05-02 DIAGNOSIS — E61.1 LOW IRON: ICD-10-CM

## 2024-05-02 DIAGNOSIS — D50.9 IRON DEFICIENCY ANEMIA, UNSPECIFIED IRON DEFICIENCY ANEMIA TYPE: Primary | ICD-10-CM

## 2024-05-02 DIAGNOSIS — K90.0 ADULT CELIAC DISEASE (HHS-HCC): ICD-10-CM

## 2024-05-02 LAB
BASOPHILS # BLD AUTO: 0.05 X10*3/UL (ref 0–0.1)
BASOPHILS NFR BLD AUTO: 0.4 %
EOSINOPHIL # BLD AUTO: 0.56 X10*3/UL (ref 0–0.7)
EOSINOPHIL NFR BLD AUTO: 5 %
ERYTHROCYTE [DISTWIDTH] IN BLOOD BY AUTOMATED COUNT: 18.4 % (ref 11.5–14.5)
HCT VFR BLD AUTO: 32.2 % (ref 36–46)
HGB BLD-MCNC: 9.3 G/DL (ref 12–16)
IMM GRANULOCYTES # BLD AUTO: 0.07 X10*3/UL (ref 0–0.7)
IMM GRANULOCYTES NFR BLD AUTO: 0.6 % (ref 0–0.9)
IRON SERPL-MCNC: 20 UG/DL (ref 35–150)
LYMPHOCYTES # BLD AUTO: 2.89 X10*3/UL (ref 1.2–4.8)
LYMPHOCYTES NFR BLD AUTO: 25.7 %
MCH RBC QN AUTO: 24.3 PG (ref 26–34)
MCHC RBC AUTO-ENTMCNC: 28.9 G/DL (ref 32–36)
MCV RBC AUTO: 84 FL (ref 80–100)
MONOCYTES # BLD AUTO: 1.17 X10*3/UL (ref 0.1–1)
MONOCYTES NFR BLD AUTO: 10.4 %
NEUTROPHILS # BLD AUTO: 6.51 X10*3/UL (ref 1.2–7.7)
NEUTROPHILS NFR BLD AUTO: 57.9 %
NRBC BLD-RTO: 0 /100 WBCS (ref 0–0)
PLATELET # BLD AUTO: 511 X10*3/UL (ref 150–450)
RBC # BLD AUTO: 3.82 X10*6/UL (ref 4–5.2)
WBC # BLD AUTO: 11.3 X10*3/UL (ref 4.4–11.3)

## 2024-05-02 PROCEDURE — 36415 COLL VENOUS BLD VENIPUNCTURE: CPT

## 2024-05-02 PROCEDURE — 85025 COMPLETE CBC W/AUTO DIFF WBC: CPT

## 2024-05-02 PROCEDURE — 83540 ASSAY OF IRON: CPT

## 2024-05-03 ENCOUNTER — TELEPHONE (OUTPATIENT)
Dept: PRIMARY CARE | Facility: CLINIC | Age: 69
End: 2024-05-03
Payer: MEDICARE

## 2024-05-03 NOTE — TELEPHONE ENCOUNTER
----- Message from Stewart Mariee MD sent at 5/3/2024  8:14 AM EDT -----  Red blood cell count and iron level are slightly better.  Continue to take your iron and repeat lab work as ordered in 2 weeks.  Please let her know.  Make sure she gets her labs in about 2 weeks

## 2024-05-09 ENCOUNTER — OFFICE VISIT (OUTPATIENT)
Dept: GASTROENTEROLOGY | Age: 69
End: 2024-05-09
Payer: MEDICARE

## 2024-05-09 VITALS — BODY MASS INDEX: 25.72 KG/M2 | OXYGEN SATURATION: 98 % | HEIGHT: 60 IN | WEIGHT: 131 LBS | HEART RATE: 61 BPM

## 2024-05-09 DIAGNOSIS — K51.80 OTHER ULCERATIVE COLITIS WITHOUT COMPLICATION (HCC): Primary | ICD-10-CM

## 2024-05-09 DIAGNOSIS — D50.9 IRON DEFICIENCY ANEMIA, UNSPECIFIED IRON DEFICIENCY ANEMIA TYPE: ICD-10-CM

## 2024-05-09 PROCEDURE — 99213 OFFICE O/P EST LOW 20 MIN: CPT | Performed by: NURSE PRACTITIONER

## 2024-05-09 PROCEDURE — 1123F ACP DISCUSS/DSCN MKR DOCD: CPT | Performed by: NURSE PRACTITIONER

## 2024-05-09 RX ORDER — IRON POLYSACCHARIDE COMPLEX 150 MG
CAPSULE ORAL
COMMUNITY

## 2024-05-09 NOTE — PROGRESS NOTES
Gastroenterology Clinic Follow up Visit    Mary Zohu  [id number]  Chief Complaint   Patient presents with    Follow-up       HPI: 68 y.o. female following up after last GI clinic on 3/28/2024     Interval change: Patient has history of rectosigmoiditis with rectal bleeding.  Initial treatment with Apriso was started slowly, patient increased to 1.5 g daily after last GI visit.  Patient denies any adverse reaction to Apriso.  Patient reports having 2-3 formed bowel movements daily.  She denies any blood nor mucus in stools.  Patient has been taking iron supplements daily with improvement in hemoglobin to 9.3.  Patient is followed by Dr. Miller at this time.  She denies any nausea, vomiting nor abdominal pain.  Of note, patient refuses treatment with Biologics due to history of worsening symptoms when on biologic therapy many years ago for rheumatoid arthritis.  Denies any unintentional weight loss, dysphagia, hematemesis, hematochezia, nor melena.    HPI from last GI clinic visit on 3/28/2024 summarized below:   Patient has started Aprisio 1 tablet daily without any adverse reaction.  Patient continues to research biologic options as discussed at last appointment and previously with gastroenterology at .  Patient refuses treatment with Biologics due to history of worsening symptoms when on biologic therapy many years ago for her rheumatoid arthritis.  Patient began taking Apriso, 0.375 g due to previous history of possible facial swelling with mesalamine medications in the past.  Patient denies any adverse reaction after 1 week.  Patient completed prednisone taper to to 3 weeks ago.  And has been seen by Dr. Miller due to lower leg edema and electrolyte imbalance.  Patient continues to have 10-15 loose to watery bowel movements daily noting streaks of blood with stools.  She continues to have intermittent abdominal cramping around bowel movements.     HPI from last GI clinic visit on 2/27/2024 summarized

## 2024-05-16 ENCOUNTER — LAB (OUTPATIENT)
Dept: LAB | Facility: LAB | Age: 69
End: 2024-05-16
Payer: MEDICARE

## 2024-05-16 DIAGNOSIS — E61.1 LOW IRON: ICD-10-CM

## 2024-05-16 DIAGNOSIS — D50.9 IRON DEFICIENCY ANEMIA, UNSPECIFIED IRON DEFICIENCY ANEMIA TYPE: ICD-10-CM

## 2024-05-16 DIAGNOSIS — K90.0 ADULT CELIAC DISEASE (HHS-HCC): ICD-10-CM

## 2024-05-16 LAB
BASOPHILS # BLD AUTO: 0.07 X10*3/UL (ref 0–0.1)
BASOPHILS NFR BLD AUTO: 1 %
EOSINOPHIL # BLD AUTO: 0.27 X10*3/UL (ref 0–0.7)
EOSINOPHIL NFR BLD AUTO: 4 %
ERYTHROCYTE [DISTWIDTH] IN BLOOD BY AUTOMATED COUNT: 17.9 % (ref 11.5–14.5)
HCT VFR BLD AUTO: 34 % (ref 36–46)
HGB BLD-MCNC: 9.9 G/DL (ref 12–16)
IMM GRANULOCYTES # BLD AUTO: 0.03 X10*3/UL (ref 0–0.7)
IMM GRANULOCYTES NFR BLD AUTO: 0.4 % (ref 0–0.9)
IRON SERPL-MCNC: 274 UG/DL (ref 35–150)
LYMPHOCYTES # BLD AUTO: 2.78 X10*3/UL (ref 1.2–4.8)
LYMPHOCYTES NFR BLD AUTO: 41 %
MCH RBC QN AUTO: 24.4 PG (ref 26–34)
MCHC RBC AUTO-ENTMCNC: 29.1 G/DL (ref 32–36)
MCV RBC AUTO: 84 FL (ref 80–100)
MONOCYTES # BLD AUTO: 0.62 X10*3/UL (ref 0.1–1)
MONOCYTES NFR BLD AUTO: 9.1 %
NEUTROPHILS # BLD AUTO: 3.01 X10*3/UL (ref 1.2–7.7)
NEUTROPHILS NFR BLD AUTO: 44.5 %
NRBC BLD-RTO: 0 /100 WBCS (ref 0–0)
PLATELET # BLD AUTO: 500 X10*3/UL (ref 150–450)
RBC # BLD AUTO: 4.06 X10*6/UL (ref 4–5.2)
WBC # BLD AUTO: 6.8 X10*3/UL (ref 4.4–11.3)

## 2024-05-16 PROCEDURE — 83540 ASSAY OF IRON: CPT

## 2024-05-16 PROCEDURE — 36415 COLL VENOUS BLD VENIPUNCTURE: CPT

## 2024-05-16 PROCEDURE — 85025 COMPLETE CBC W/AUTO DIFF WBC: CPT

## 2024-05-20 ENCOUNTER — TELEPHONE (OUTPATIENT)
Dept: PRIMARY CARE | Facility: CLINIC | Age: 69
End: 2024-05-20
Payer: MEDICARE

## 2024-05-20 DIAGNOSIS — E61.1 LOW IRON: ICD-10-CM

## 2024-05-20 NOTE — TELEPHONE ENCOUNTER
----- Message from Stewart Mariee MD sent at 5/18/2024  1:52 PM EDT -----  Labs indicate your iron level is now too high.  Discontinue iron pills for 1 week then resume but just take it once a week.  Repeat CBC and iron level in 1 month.  Please let her know and arrange

## 2024-05-23 NOTE — PROGRESS NOTES
Subjective   Patient ID: Leola Jennings is a 68 y.o. female who presents for Follow-up.    HPI    Patient presents today for edema follow up.    Taking current medications which were reviewed.  Problem list discussed.    Overall doing well.  Eating okay.  Staying active.    Has no other new problem /question.  Review labs        ROS  Constitutional- No activity change. No appetite change.  Eyes- Denies vision changes.  Respiratory- No shortness of breath.  Cardiovascular- No palpitations. No chest pain.  GI- No nausea or vomiting. No diarrhea or constipation. Denies abdominal pain.  Musculoskeletal- Denies joint swelling.  Extremities- No edema.  Neurological- Denies headaches. Denies dizziness.  Skin- No rashes.  Psychiatric/Behavioral- Denies significant anxiety, or depressed mood.     Objective     /68 (BP Location: Right arm)   Pulse 79   Temp 36.6 °C (97.8 °F)   Resp 16   Ht 1.524 m (5')   Wt 61.7 kg (136 lb)   LMP  (LMP Unknown)   SpO2 97%   BMI 26.56 kg/m²     Allergies   Allergen Reactions    Coconut Oil, Hydrogenated Rash       Constitutional-- Well-nourished.  No distress  Head- unremarkable.  Ears- TMs clear.  Eyes- PERRL.  Conjunctiva normal.  Nose- Normal.  No rhinorrhea noted.  Throat- Oropharynx is clear and moist.  Neck- Supple with no thyromegaly.  No significant cervical adenopathy noted.  Pulmonary/Chest- Breath sounds normal with normal effort.  No wheezing.  Heart- Regular rate and rhythm.  No murmur.  Abdomen- Soft and non-tender.  No masses noted.  Musculoskeletal- Normal ROM.  No significant joint swelling  Extremities- No edema.   Neurological- Alert.  No noted deficits.  Skin- Warm.  No rashes.  Psychiatric/Behavioral- Mood and affect normal.  Behavior normal.     Assessment/Plan   1. Leg swelling        2. Bilateral leg edema        3. Rheumatoid arthritis involving multiple sites with positive rheumatoid factor (Multi)        4. Hypercholesterolemia        5. Low iron                Long talk. Treatment options reviewed.    Reviewed most recent lab work with patient. Advised patient to remain up to date on routine maintenance and health screening.  Maintain appointments with specialists as scheduled.      Discussed leg swelling. Advised patient to wear compression stockings.   Continue current diuretic.  Okay to not take on Sundays.  Discussed low iron levels.      Complete blood work as discussed.  Talked about getting the blood work done in 1 to 2 weeks.  Advised patient to remain properly hydrated.     Continue and take your medications as prescribed.    Health Maintenance issues discussed.    Importance of healthy diet and regular exercise regimen discussed.    We will contact you with any test results ordered. If you do not hear from us, please contact.    Follow-up as instructed or sooner if any problems or symptoms do not resolve as expected.        Scribe Attestation  By signing my name below, Gabriella MONTE Scribe   attest that this documentation has been prepared under the direction and in the presence of Stewart Mariee MD.

## 2024-05-28 ENCOUNTER — OFFICE VISIT (OUTPATIENT)
Dept: PRIMARY CARE | Facility: CLINIC | Age: 69
End: 2024-05-28
Payer: MEDICARE

## 2024-05-28 VITALS
OXYGEN SATURATION: 97 % | HEIGHT: 60 IN | WEIGHT: 136 LBS | RESPIRATION RATE: 16 BRPM | SYSTOLIC BLOOD PRESSURE: 130 MMHG | TEMPERATURE: 97.8 F | DIASTOLIC BLOOD PRESSURE: 68 MMHG | BODY MASS INDEX: 26.7 KG/M2 | HEART RATE: 79 BPM

## 2024-05-28 DIAGNOSIS — M79.89 LEG SWELLING: ICD-10-CM

## 2024-05-28 DIAGNOSIS — R60.0 BILATERAL LEG EDEMA: ICD-10-CM

## 2024-05-28 DIAGNOSIS — E61.1 LOW IRON: ICD-10-CM

## 2024-05-28 DIAGNOSIS — E78.00 HYPERCHOLESTEROLEMIA: ICD-10-CM

## 2024-05-28 DIAGNOSIS — M05.79 RHEUMATOID ARTHRITIS INVOLVING MULTIPLE SITES WITH POSITIVE RHEUMATOID FACTOR (MULTI): ICD-10-CM

## 2024-05-28 PROCEDURE — 3008F BODY MASS INDEX DOCD: CPT | Performed by: FAMILY MEDICINE

## 2024-05-28 PROCEDURE — 1160F RVW MEDS BY RX/DR IN RCRD: CPT | Performed by: FAMILY MEDICINE

## 2024-05-28 PROCEDURE — 99213 OFFICE O/P EST LOW 20 MIN: CPT | Performed by: FAMILY MEDICINE

## 2024-05-28 PROCEDURE — 1124F ACP DISCUSS-NO DSCNMKR DOCD: CPT | Performed by: FAMILY MEDICINE

## 2024-05-28 PROCEDURE — 1159F MED LIST DOCD IN RCRD: CPT | Performed by: FAMILY MEDICINE

## 2024-05-28 ASSESSMENT — ENCOUNTER SYMPTOMS
OCCASIONAL FEELINGS OF UNSTEADINESS: 0
DEPRESSION: 0
LOSS OF SENSATION IN FEET: 0

## 2024-06-13 ENCOUNTER — LAB (OUTPATIENT)
Dept: LAB | Facility: LAB | Age: 69
End: 2024-06-13
Payer: MEDICARE

## 2024-06-13 DIAGNOSIS — E61.1 LOW IRON: ICD-10-CM

## 2024-06-13 LAB
BASOPHILS # BLD AUTO: 0.06 X10*3/UL (ref 0–0.1)
BASOPHILS NFR BLD AUTO: 0.9 %
EOSINOPHIL # BLD AUTO: 0.27 X10*3/UL (ref 0–0.7)
EOSINOPHIL NFR BLD AUTO: 3.9 %
ERYTHROCYTE [DISTWIDTH] IN BLOOD BY AUTOMATED COUNT: 19.8 % (ref 11.5–14.5)
HCT VFR BLD AUTO: 32.6 % (ref 36–46)
HGB BLD-MCNC: 9.8 G/DL (ref 12–16)
IMM GRANULOCYTES # BLD AUTO: 0.02 X10*3/UL (ref 0–0.7)
IMM GRANULOCYTES NFR BLD AUTO: 0.3 % (ref 0–0.9)
IRON SERPL-MCNC: 26 UG/DL (ref 35–150)
LYMPHOCYTES # BLD AUTO: 2.43 X10*3/UL (ref 1.2–4.8)
LYMPHOCYTES NFR BLD AUTO: 34.9 %
MCH RBC QN AUTO: 25.2 PG (ref 26–34)
MCHC RBC AUTO-ENTMCNC: 30.1 G/DL (ref 32–36)
MCV RBC AUTO: 84 FL (ref 80–100)
MONOCYTES # BLD AUTO: 0.76 X10*3/UL (ref 0.1–1)
MONOCYTES NFR BLD AUTO: 10.9 %
NEUTROPHILS # BLD AUTO: 3.42 X10*3/UL (ref 1.2–7.7)
NEUTROPHILS NFR BLD AUTO: 49.1 %
NRBC BLD-RTO: 0 /100 WBCS (ref 0–0)
PLATELET # BLD AUTO: 354 X10*3/UL (ref 150–450)
RBC # BLD AUTO: 3.89 X10*6/UL (ref 4–5.2)
WBC # BLD AUTO: 7 X10*3/UL (ref 4.4–11.3)

## 2024-06-13 PROCEDURE — 36415 COLL VENOUS BLD VENIPUNCTURE: CPT

## 2024-06-13 PROCEDURE — 85025 COMPLETE CBC W/AUTO DIFF WBC: CPT

## 2024-06-13 PROCEDURE — 83540 ASSAY OF IRON: CPT

## 2024-06-14 ENCOUNTER — TELEPHONE (OUTPATIENT)
Dept: PRIMARY CARE | Facility: CLINIC | Age: 69
End: 2024-06-14
Payer: MEDICARE

## 2024-06-14 NOTE — TELEPHONE ENCOUNTER
----- Message from Stewart Mariee MD sent at 6/14/2024  1:27 PM EDT -----  Iron level remains low.  If not taking her iron pill daily make sure she takes it every day.  Given the fact that her blood count has not gone back to normal recommend referral to GI for possible GI loss of blood.  Follow-up with me in a month or 2.  Thanks please let her know and arrange

## 2024-06-14 NOTE — TELEPHONE ENCOUNTER
Patient called back and is aware of message. Made an appointment with you for 7/26/24  She states she had followed up with her GI doctor back in May 2024 and made that doctor aware of her iron levels. She isn't having any kind of blood loss and states her GI doctor is aware of all of her blood work

## 2024-07-26 ENCOUNTER — APPOINTMENT (OUTPATIENT)
Dept: PRIMARY CARE | Facility: CLINIC | Age: 69
End: 2024-07-26
Payer: MEDICARE

## 2024-11-15 ENCOUNTER — APPOINTMENT (OUTPATIENT)
Dept: PRIMARY CARE | Facility: CLINIC | Age: 69
End: 2024-11-15
Payer: MEDICARE

## 2025-02-18 ENCOUNTER — TELEPHONE (OUTPATIENT)
Dept: PRIMARY CARE | Facility: CLINIC | Age: 70
End: 2025-02-18
Payer: MEDICARE

## 2025-02-18 DIAGNOSIS — J06.9 UPPER RESPIRATORY TRACT INFECTION, UNSPECIFIED TYPE: Primary | ICD-10-CM

## 2025-02-18 RX ORDER — AZITHROMYCIN 250 MG/1
TABLET, FILM COATED ORAL
Qty: 6 TABLET | Refills: 0 | Status: SHIPPED | OUTPATIENT
Start: 2025-02-18 | End: 2025-02-23

## 2025-02-18 NOTE — TELEPHONE ENCOUNTER
Spoke with patient - she is aware that medication was sent to pharmacy and to continue otc medication for cold symptom relief.

## 2025-02-18 NOTE — TELEPHONE ENCOUNTER
Patient is calling because for the past 1 1/2 weeks- 2 weeks she's been having a runny nose, coughing up phlegm. She states she's been coughing so much that her ribs and abdomen are hurting. She states the OTC meds are not helping. She's been using menthol cough drops, dayquil and nyquil but nothing seems to be helping. She hasn't been seen since 5/28/24. Aware she would need to be seen in order to be prescribed medication. She hasn't taken a COVID test, doesn't have one and she can't come out for an appointment because she doesn't want to scrape off her car, shovel out her driveway.     Please advise as to what she should do    Thanks    Patient's # [phone]    Preferred pharmacy [facility]

## 2025-02-18 NOTE — TELEPHONE ENCOUNTER
Stewart Mariee MD  Do [id number] [facility] Clerical2 hours ago (10:41 AM)       Please let her know that under the circumstances I sent in an antibiotic for her to take as directed.  Continue over-the-counter cold medicine as needed for symptom relief.  Please let her know

## 2025-04-21 ENCOUNTER — APPOINTMENT (OUTPATIENT)
Dept: PRIMARY CARE | Facility: CLINIC | Age: 70
End: 2025-04-21
Payer: MEDICARE

## 2025-05-21 ENCOUNTER — APPOINTMENT (OUTPATIENT)
Dept: PRIMARY CARE | Facility: CLINIC | Age: 70
End: 2025-05-21
Payer: MEDICARE

## 2025-05-21 VITALS
WEIGHT: 147.8 LBS | OXYGEN SATURATION: 96 % | BODY MASS INDEX: 29.02 KG/M2 | HEART RATE: 88 BPM | SYSTOLIC BLOOD PRESSURE: 154 MMHG | DIASTOLIC BLOOD PRESSURE: 76 MMHG | HEIGHT: 60 IN

## 2025-05-21 DIAGNOSIS — M25.50 POLYARTHRALGIA: ICD-10-CM

## 2025-05-21 DIAGNOSIS — E78.2 HYPERCHOLESTEROLEMIA WITH HYPERTRIGLYCERIDEMIA: ICD-10-CM

## 2025-05-21 DIAGNOSIS — R73.9 HYPERGLYCEMIA: ICD-10-CM

## 2025-05-21 DIAGNOSIS — D50.9 IRON DEFICIENCY ANEMIA, UNSPECIFIED IRON DEFICIENCY ANEMIA TYPE: ICD-10-CM

## 2025-05-21 DIAGNOSIS — R53.81 DEBILITY: ICD-10-CM

## 2025-05-21 DIAGNOSIS — E55.9 VITAMIN D DEFICIENCY: ICD-10-CM

## 2025-05-21 DIAGNOSIS — Z12.39 ENCOUNTER FOR SCREENING FOR MALIGNANT NEOPLASM OF BREAST, UNSPECIFIED SCREENING MODALITY: ICD-10-CM

## 2025-05-21 DIAGNOSIS — K52.9 INFLAMMATORY BOWEL DISEASE: Primary | ICD-10-CM

## 2025-05-21 DIAGNOSIS — R03.0 BLOOD PRESSURE ELEVATED WITHOUT HISTORY OF HTN: ICD-10-CM

## 2025-05-21 DIAGNOSIS — R53.83 FATIGUE, UNSPECIFIED TYPE: ICD-10-CM

## 2025-05-21 DIAGNOSIS — Z13.820 SCREENING FOR OSTEOPOROSIS: ICD-10-CM

## 2025-05-21 DIAGNOSIS — Z11.59 ENCOUNTER FOR HEPATITIS C SCREENING TEST FOR LOW RISK PATIENT: ICD-10-CM

## 2025-05-21 PROCEDURE — 1036F TOBACCO NON-USER: CPT | Performed by: INTERNAL MEDICINE

## 2025-05-21 PROCEDURE — 1160F RVW MEDS BY RX/DR IN RCRD: CPT | Performed by: INTERNAL MEDICINE

## 2025-05-21 PROCEDURE — G2211 COMPLEX E/M VISIT ADD ON: HCPCS | Performed by: INTERNAL MEDICINE

## 2025-05-21 PROCEDURE — 1159F MED LIST DOCD IN RCRD: CPT | Performed by: INTERNAL MEDICINE

## 2025-05-21 PROCEDURE — 99214 OFFICE O/P EST MOD 30 MIN: CPT | Performed by: INTERNAL MEDICINE

## 2025-05-21 PROCEDURE — 3008F BODY MASS INDEX DOCD: CPT | Performed by: INTERNAL MEDICINE

## 2025-05-21 NOTE — PROGRESS NOTES
"Subjective   Patient ID: eLola Jennings is a 69 y.o. female who presents for [facility] (Patient presented today to [facility].).    HPI   New patient, here for primary care.  Last seen by her previous PCP April 2024.  States that she felt the need to start fresh with a new PCP, because she wanted perhaps a second opinion, and perhaps she felt her symptoms were repetitive and were not being addressed adequately.    These include recurrence of abdominal distress, anorexia, nausea, vomiting of recently ingested food, as well as diarrhea, history of blood, history of mucus.  She has undergone multiple endoscopic studies, and multiple biopsies, with apparently conflicting results.  At 1 point, she was told that she had \"ULCERATIVE COLITIS\" and inflammatory bowel disease, and given a trial of MESALAMINE.  Results apparently were not therapeutic, and she was recommended to look into BIOLOGICS, which the patient respectfully refused.    Further, she pointed out that other studies, including biopsies, revealed negative findings.  In the meantime, she has had modest appetite, currently, no nausea, no vomiting, no abdominal distress, no diarrhea, but still with MUCUS in her stool.  Instead of weight loss, she has had WEIGHT GAIN.    No skin changes, rashes, pruritus, jaundice.  No easy bruisability.  Likewise, no dysuria, flank, suprapubic pain.  No apparent blood in urine.    Again, no gum or nose bleeding.  No easy bruisability.  Currently, no apparent blood in urine or stool.  Likewise, currently, no dyspeptic symptoms.    Does state that she is always tired, and suffers from achiness affecting multiple joints.  Otherwise, no swelling, no particular stiffness or warmth noted.  No history of trauma.  Ambulating without need of assistive device, and no history of recent falls.    Again, does want to improve quality of life, and does not wish harm to self or others.  No headache, blurred vision, no diplopia, no " "dysphagia.  No aris substernal chest pain, no orthopnea, no paroxysmal nocturnal dyspnea.  No particular cough or sputum production.  CONSTITUTIONALLY, no fever, no chills.  No night sweats.  No lingering anorexia or nausea.  No apparent lymphadenopathy.  No apparent weight loss.    The patient points out that she can be \"desperately sick,\" but currently, she feels that she is doing well, and again, the only thing out of the ordinary that she has noted is mucus in her stool.  No apparent blood.  Again, weight gain, not weight loss.  Appetite baseline.    Additionally, she has had what appears to be BILATERAL EDEMA, not present at this time.  At its worst, she has had trouble with stiff joints and with the swelling, relative immobility.  Again, currently, ambulating without need of assistive device.    Status post PARATHYROIDECTOMY, with one of the 4 parathyroid glands removed.  Currently, ENDOCRINE with no polyuria, polydipsia, polyphagia.  No blurred vision.  No skin, hair, nail changes.  No dramatic weight loss or weight gain.      The patient states that she lives alone, , and because of this, she can cook for herself, and feels that she is stress-free.  Father still living, homebound, with brother coming back from out of [facility] now living in [place] to help with care of father.        Review of Systems  Review of systems as in history of present illness, and otherwise, reviewed separately as well, and was unremarkable/negative/noncontributory.         Objective   /76 (BP Location: Left arm, Patient Position: Sitting, BP Cuff Size: Adult)   Pulse 88   Ht 1.524 m (5')   Wt 67 kg (147 lb 12.8 oz)   LMP  (LMP Unknown)   SpO2 96%   BMI 28.87 kg/m²     Physical Exam  In hopeful spirits.  Not in distress or diaphoresis.  Alert, oriented x 3.  Amiable.  Not unkempt.  Moderately built.  Does want to improve quality of life, and does not wish harm to self or others.  Independent, capable, " appropriate.    HEAD pink palpebral conjunctivae, anicteric sclerae.  Mucous membranes moist.  NECK supple, no apparent jugular venous distention.  No carotid bruit.  CARDIOVASCULAR not in distress or diaphoresis.  No bipedal edema.  Perhaps fourth heart sound, with very soft systolic murmur heard best along the apex.  LUNGS not in distress or diaphoresis.  Not using accessory muscles.  Clear to auscultation bilaterally.  ABDOMEN currently with positive bowel sounds, soft, nontender.  No direct, no rebound tenderness.  No guarding.  No masses appreciated.  No hernia appreciated.  BACK no costovertebral angle tenderness.  EXTREMITIES no clubbing, no cyanosis.  NEURO no facial asymmetry.  No apparent cranial nerve deficits.  Romberg negative.  Ambulating without need of assistance.  No apparent focal weakness.  No tremors.  PSYCH receptive, appropriate, and eager to maintain and improve quality of life.        Previous LABORATORY results noted, reviewed with patient.  History of urinary tract infection, microscopic hematuria, noted.  Currently, no urinary symptoms.  ANEMIA noted as well.  Hemoglobin A1c noted at 5.6, with current body mass index 28.87.  Hypercholesterolemia, hypertriglyceridemia likewise noted.  Current ASCVD risk 14%.        Assessment/Plan   Diagnoses and all orders for this visit:  Inflammatory bowel disease  -     CBC and Auto Differential; Future  -     Comprehensive Metabolic Panel; Future  -     TSH with reflex to Free T4 if abnormal; Future  -     Magnesium; Future  -     Urinalysis with Reflex Microscopic; Future  -     Vitamin B12; Future  -     Folate; Future  -     Referral to Gastroenterology; Future  -     Amylase; Future  -     Lipase; Future  -     Follow Up In Primary Care - Established; Future  Iron deficiency anemia, unspecified iron deficiency anemia type  -     CBC and Auto Differential; Future  -     Urinalysis with Reflex Microscopic; Future  -     Vitamin B12; Future  -      Folate; Future  -     Ferritin; Future  -     Iron and TIBC; Future  -     Referral to Gastroenterology; Future  -     Follow Up In Primary Care - Established; Future  Polyarthralgia  -     Comprehensive Metabolic Panel; Future  -     Vitamin B12; Future  -     Folate; Future  -     Creatine Kinase; Future  -     Uric Acid; Future  -     Vitamin D 25-Hydroxy,Total (for eval of Vitamin D levels); Future  -     Follow Up In Primary Care - Established; Future  Blood pressure elevated without history of HTN  -     CBC and Auto Differential; Future  -     Comprehensive Metabolic Panel; Future  -     TSH with reflex to Free T4 if abnormal; Future  -     Magnesium; Future  -     Urinalysis with Reflex Microscopic; Future  -     Follow Up In Primary Care - Established; Future  Hypercholesterolemia with hypertriglyceridemia  -     Comprehensive Metabolic Panel; Future  -     Lipid Panel; Future  -     Follow Up In Primary Care - Established; Future  Hyperglycemia  -     Comprehensive Metabolic Panel; Future  -     Urinalysis with Reflex Microscopic; Future  -     Hemoglobin A1C; Future  -     Follow Up In Primary Care - Established; Future  Vitamin D deficiency  -     Comprehensive Metabolic Panel; Future  -     Vitamin D 25-Hydroxy,Total (for eval of Vitamin D levels); Future  -     Follow Up In Primary Care - Established; Future  Screening for osteoporosis  -     Comprehensive Metabolic Panel; Future  -     TSH with reflex to Free T4 if abnormal; Future  -     Vitamin D 25-Hydroxy,Total (for eval of Vitamin D levels); Future  -     Follow Up In Primary Care - Established; Future  Encounter for screening for malignant neoplasm of breast, unspecified screening modality  -     CBC and Auto Differential; Future  -     Follow Up In Primary Care - Established; Future  Encounter for hepatitis C screening test for low risk patient  -     Comprehensive Metabolic Panel; Future  -     Hepatitis C Antibody; Future  -     Follow Up In  Primary Care - Established; Future  Debility  -     CBC and Auto Differential; Future  -     Comprehensive Metabolic Panel; Future  -     TSH with reflex to Free T4 if abnormal; Future  -     Magnesium; Future  -     Urinalysis with Reflex Microscopic; Future  -     Vitamin B12; Future  -     Folate; Future  -     Vitamin D 25-Hydroxy,Total (for eval of Vitamin D levels); Future  -     Follow Up In Primary Care - Established; Future  Fatigue, unspecified type  -     CBC and Auto Differential; Future  -     Comprehensive Metabolic Panel; Future  -     TSH with reflex to Free T4 if abnormal; Future  -     Magnesium; Future  -     Urinalysis with Reflex Microscopic; Future  -     Vitamin B12; Future  -     Folate; Future  -     Vitamin D 25-Hydroxy,Total (for eval of Vitamin D levels); Future  -     Follow Up In Primary Care - Established; Future       Thank you for much for coming.  It is nice to see you.    I am glad to hear that you are doing well.  In fact, your physical examination is good at this time.    Your blood pressure is a little high.  Please drink lots of fluids throughout the day, and please avoid excessive salt.  Please stay physically active with lots of movement.  All of these will impact your blood pressure favorably.    Thank you for letting me know of your history.  It does sound like you have INFLAMMATION in your GUT, especially with the findings of MUCUS in your stool.    Fasting BLOOD and URINE examinations today.  I will send word regarding results and possible changes.  Although you are feeling okay now, it will be a good idea to get you an appointment with Dr. Barrow, GASTROENTEROLOGY.  She is the head of the Endoscopy Department of  in [place].  She will be a very good resource person!    Again, let me call you with results and possible changes.    Please let me see you again in about 4 weeks.  Until then, please continue to take care of yourself.  Small frequent meals.  [place]  diet if needed.  If you start getting sick again, please call me and let me know.    Please make sure that you continue to drink lots of fluids throughout the day.  GATORADE or equivalent, these are good for patients like you have had a history of mucus in stool, and you have had history of diarrhea.    Take care and God bless.  See you in about 4 weeks.  I hope you had a good Easter Sunday, and I do wish you a happy summer.            0  Return in 4 weeks.  20 minutes please.  Reassess debility.  Consider twelve-lead EKG, reassessment of hemodynamics, review of fasting laboratory results.  Possibly coordinate with GI if already seen.  Consider referral to rheumatology, further rheumatological workup as needed.  Prioritize issues.  Review preventive strategies, bone density, vaccination profile, mammogram.  Medicare wellness in the near future.            0

## 2025-05-21 NOTE — PATIENT INSTRUCTIONS
Thank you for much for coming.  It is nice to see you.    I am glad to hear that you are doing well.  In fact, your physical examination is good at this time.    Your blood pressure is a little high.  Please drink lots of fluids throughout the day, and please avoid excessive salt.  Please stay physically active with lots of movement.  All of these will impact your blood pressure favorably.    Thank you for letting me know of your history.  It does sound like you have INFLAMMATION in your GUT, especially with the findings of MUCUS in your stool.    Fasting BLOOD and URINE examinations today.  I will send word regarding results and possible changes.  Although you are feeling okay now, it will be a good idea to get you an appointment with Dr. Barrow, GASTROENTEROLOGY.  She is the head of the Endoscopy Department of  in [place].  She will be a very good resource person!    Again, let me call you with results and possible changes.    Please let me see you again in about 4 weeks.  Until then, please continue to take care of yourself.  Small frequent meals.  [place] diet if needed.  If you start getting sick again, please call me and let me know.    Please make sure that you continue to drink lots of fluids throughout the day.  GATORADE or equivalent, these are good for patients like you have had a history of mucus in stool, and you have had history of diarrhea.    Take care and God bless.  See you in about 4 weeks.  I hope you had a good Easter Sunday, and I do wish you a happy summer.            0  Return in 4 weeks.  20 minutes please.  Reassess debility.  Consider twelve-lead EKG, reassessment of hemodynamics, review of fasting laboratory results.  Possibly coordinate with GI if already seen.  Consider referral to rheumatology, further rheumatological workup as needed.  Prioritize issues.  Review preventive strategies, bone density, vaccination profile, mammogram.  Medicare wellness in the near  future.            0

## 2025-05-23 LAB
25(OH)D3+25(OH)D2 SERPL-MCNC: 19 NG/ML (ref 30–100)
ALBUMIN SERPL-MCNC: 3.7 G/DL (ref 3.6–5.1)
ALP SERPL-CCNC: 83 U/L (ref 37–153)
ALT SERPL-CCNC: 12 U/L (ref 6–29)
AMYLASE SERPL-CCNC: 117 U/L (ref 21–101)
ANION GAP SERPL CALCULATED.4IONS-SCNC: 10 MMOL/L (CALC) (ref 7–17)
APPEARANCE UR: CLEAR
AST SERPL-CCNC: 14 U/L (ref 10–35)
BACTERIA #/AREA URNS HPF: ABNORMAL /HPF
BASOPHILS # BLD AUTO: 58 CELLS/UL (ref 0–200)
BASOPHILS NFR BLD AUTO: 0.7 %
BILIRUB SERPL-MCNC: 0.3 MG/DL (ref 0.2–1.2)
BILIRUB UR QL STRIP: NEGATIVE
BUN SERPL-MCNC: 10 MG/DL (ref 7–25)
CALCIUM SERPL-MCNC: 8.4 MG/DL (ref 8.6–10.4)
CAOX CRY #/AREA URNS HPF: ABNORMAL /HPF
CHLORIDE SERPL-SCNC: 108 MMOL/L (ref 98–110)
CHOLEST SERPL-MCNC: 189 MG/DL
CHOLEST/HDLC SERPL: 4.4 (CALC)
CK SERPL-CCNC: 47 U/L (ref 18–225)
CO2 SERPL-SCNC: 22 MMOL/L (ref 20–32)
COLOR UR: YELLOW
CREAT SERPL-MCNC: 0.78 MG/DL (ref 0.6–1)
EGFRCR SERPLBLD CKD-EPI 2021: 82 ML/MIN/1.73M2
EOSINOPHIL # BLD AUTO: 224 CELLS/UL (ref 15–500)
EOSINOPHIL NFR BLD AUTO: 2.7 %
ERYTHROCYTE [DISTWIDTH] IN BLOOD BY AUTOMATED COUNT: 16.8 % (ref 11–15)
EST. AVERAGE GLUCOSE BLD GHB EST-MCNC: 126 MG/DL
EST. AVERAGE GLUCOSE BLD GHB EST-SCNC: 7 MMOL/L
FERRITIN SERPL-MCNC: 11 NG/ML (ref 16–288)
FOLATE SERPL-MCNC: 8.7 NG/ML
GLUCOSE SERPL-MCNC: 96 MG/DL (ref 65–99)
GLUCOSE UR QL STRIP: NEGATIVE
HBA1C MFR BLD: 6 %
HCT VFR BLD AUTO: 32.8 % (ref 35–45)
HCV AB SERPL QL IA: NORMAL
HDLC SERPL-MCNC: 43 MG/DL
HGB BLD-MCNC: 9.7 G/DL (ref 11.7–15.5)
HGB UR QL STRIP: NEGATIVE
HYALINE CASTS #/AREA URNS LPF: ABNORMAL /LPF
IRON SATN MFR SERPL: 6 % (CALC) (ref 16–45)
IRON SERPL-MCNC: 22 MCG/DL (ref 45–160)
KETONES UR QL STRIP: ABNORMAL
LDLC SERPL CALC-MCNC: 116 MG/DL (CALC)
LEUKOCYTE ESTERASE UR QL STRIP: NEGATIVE
LIPASE SERPL-CCNC: 67 U/L (ref 7–60)
LYMPHOCYTES # BLD AUTO: 2457 CELLS/UL (ref 850–3900)
LYMPHOCYTES NFR BLD AUTO: 29.6 %
MAGNESIUM SERPL-MCNC: 2.2 MG/DL (ref 1.5–2.5)
MCH RBC QN AUTO: 23.6 PG (ref 27–33)
MCHC RBC AUTO-ENTMCNC: 29.6 G/DL (ref 32–36)
MCV RBC AUTO: 79.8 FL (ref 80–100)
MONOCYTES # BLD AUTO: 955 CELLS/UL (ref 200–950)
MONOCYTES NFR BLD AUTO: 11.5 %
NEUTROPHILS # BLD AUTO: 4607 CELLS/UL (ref 1500–7800)
NEUTROPHILS NFR BLD AUTO: 55.5 %
NITRITE UR QL STRIP: NEGATIVE
NONHDLC SERPL-MCNC: 146 MG/DL (CALC)
PH UR STRIP: 5.5 [PH] (ref 5–8)
PLATELET # BLD AUTO: 471 THOUSAND/UL (ref 140–400)
PMV BLD REES-ECKER: 9.7 FL (ref 7.5–12.5)
POTASSIUM SERPL-SCNC: 4.2 MMOL/L (ref 3.5–5.3)
PROT SERPL-MCNC: 7.4 G/DL (ref 6.1–8.1)
PROT UR QL STRIP: ABNORMAL
RBC # BLD AUTO: 4.11 MILLION/UL (ref 3.8–5.1)
RBC #/AREA URNS HPF: ABNORMAL /HPF
SERVICE CMNT-IMP: ABNORMAL
SODIUM SERPL-SCNC: 140 MMOL/L (ref 135–146)
SP GR UR STRIP: 1.02 (ref 1–1.03)
SQUAMOUS #/AREA URNS HPF: ABNORMAL /HPF
TIBC SERPL-MCNC: 399 MCG/DL (CALC) (ref 250–450)
TRIGL SERPL-MCNC: 188 MG/DL
TSH SERPL-ACNC: 1.82 MIU/L (ref 0.4–4.5)
URATE SERPL-MCNC: 6.1 MG/DL (ref 2.5–7)
VIT B12 SERPL-MCNC: 585 PG/ML (ref 200–1100)
WBC # BLD AUTO: 8.3 THOUSAND/UL (ref 3.8–10.8)
WBC #/AREA URNS HPF: ABNORMAL /HPF

## 2025-06-16 DIAGNOSIS — Z12.31 ENCOUNTER FOR SCREENING MAMMOGRAM FOR BREAST CANCER: ICD-10-CM

## 2025-06-19 ENCOUNTER — APPOINTMENT (OUTPATIENT)
Dept: PRIMARY CARE | Facility: CLINIC | Age: 70
End: 2025-06-19
Payer: MEDICARE

## 2025-06-19 VITALS
OXYGEN SATURATION: 98 % | SYSTOLIC BLOOD PRESSURE: 121 MMHG | HEIGHT: 60 IN | WEIGHT: 138.9 LBS | BODY MASS INDEX: 27.27 KG/M2 | HEART RATE: 107 BPM | DIASTOLIC BLOOD PRESSURE: 72 MMHG

## 2025-06-19 DIAGNOSIS — E78.2 MIXED HYPERLIPIDEMIA: ICD-10-CM

## 2025-06-19 DIAGNOSIS — N30.00 ACUTE CYSTITIS WITHOUT HEMATURIA: ICD-10-CM

## 2025-06-19 DIAGNOSIS — K52.9 INFLAMMATORY BOWEL DISEASE: ICD-10-CM

## 2025-06-19 DIAGNOSIS — Z12.39 BREAST SCREENING: ICD-10-CM

## 2025-06-19 DIAGNOSIS — Z13.820 SCREENING FOR OSTEOPOROSIS: ICD-10-CM

## 2025-06-19 DIAGNOSIS — F17.210 CIGARETTE NICOTINE DEPENDENCE WITHOUT COMPLICATION: ICD-10-CM

## 2025-06-19 DIAGNOSIS — Z91.89 FRAMINGHAM CARDIAC RISK <10% IN NEXT 10 YEARS: Chronic | ICD-10-CM

## 2025-06-19 DIAGNOSIS — R11.2 NAUSEA AND VOMITING, UNSPECIFIED VOMITING TYPE: ICD-10-CM

## 2025-06-19 DIAGNOSIS — R03.0 BLOOD PRESSURE ELEVATED WITHOUT HISTORY OF HTN: ICD-10-CM

## 2025-06-19 DIAGNOSIS — D50.9 IRON DEFICIENCY ANEMIA, UNSPECIFIED IRON DEFICIENCY ANEMIA TYPE: ICD-10-CM

## 2025-06-19 DIAGNOSIS — R53.81 DEBILITY: ICD-10-CM

## 2025-06-19 DIAGNOSIS — R73.03 PREDIABETES: ICD-10-CM

## 2025-06-19 DIAGNOSIS — K85.90 ACUTE PANCREATITIS, UNSPECIFIED COMPLICATION STATUS, UNSPECIFIED PANCREATITIS TYPE (HHS-HCC): Primary | ICD-10-CM

## 2025-06-19 DIAGNOSIS — E55.9 VITAMIN D DEFICIENCY: ICD-10-CM

## 2025-06-19 PROBLEM — M25.50 POLYARTHRALGIA: Status: ACTIVE | Noted: 2025-06-19

## 2025-06-19 PROCEDURE — 1036F TOBACCO NON-USER: CPT | Performed by: INTERNAL MEDICINE

## 2025-06-19 PROCEDURE — 3008F BODY MASS INDEX DOCD: CPT | Performed by: INTERNAL MEDICINE

## 2025-06-19 PROCEDURE — G2211 COMPLEX E/M VISIT ADD ON: HCPCS | Performed by: INTERNAL MEDICINE

## 2025-06-19 PROCEDURE — 1160F RVW MEDS BY RX/DR IN RCRD: CPT | Performed by: INTERNAL MEDICINE

## 2025-06-19 PROCEDURE — 1159F MED LIST DOCD IN RCRD: CPT | Performed by: INTERNAL MEDICINE

## 2025-06-19 PROCEDURE — 99214 OFFICE O/P EST MOD 30 MIN: CPT | Performed by: INTERNAL MEDICINE

## 2025-06-19 RX ORDER — PANCRELIPASE LIPASE, PANCRELIPASE PROTEASE, PANCRELIPASE AMYLASE 40000; 126000; 168000 [USP'U]/1; [USP'U]/1; [USP'U]/1
CAPSULE, DELAYED RELEASE ORAL
Qty: 250 CAPSULE | Refills: 0 | COMMUNITY
Start: 2025-06-19

## 2025-06-19 RX ORDER — ONDANSETRON 4 MG/1
TABLET, ORALLY DISINTEGRATING ORAL
Qty: 30 TABLET | Refills: 0 | Status: SHIPPED | OUTPATIENT
Start: 2025-06-19

## 2025-06-19 NOTE — PATIENT INSTRUCTIONS
Thank you very much for coming.  I am very happy to see you.    I am sorry that you had been sick for at least 1 week now.  You have had loss of appetite, nausea, vomiting, diarrhea, all consistent with the laboratory results that indicate that you have PANCREATITIS.  You also do have a history of INFLAMMATORY bowel disease and IRON DEFICIENCY ANEMIA.  All of these are likely related.    You may also have a URINE INFECTION.    You will benefit from BLOOD and URINE examinations today.    You will also benefit from a CT SCAN of your abdomen, because it is painful and distended.  Instead of going to the ER, we can go ahead and do some workup now.    Let me send word regarding results and possible changes.    In the meantime, try to eat small frequent meals.  Concentrate on a BLAND DIET, and avoid fat and dairy.  Please get yourself some GATORADE and make sure that you stay HYDRATED.  Drink lots of water as well.    I have given you samples of ZENPEP.  Please take 2 capsules by mouth 15 minutes before breakfast, 15 minutes before lunch, and 15 minutes before supper.  Take 2 capsules by mouth 15 minutes before each meal.    If you are going to have a SNACK in between, go ahead and take 1 capsule by mouth 15 minutes before your snack.  You can do this 2-3 times a day.    If you feel NAUSEOUS, you can melt ONDANSETRON 4 mg tablet on your tongue every 6 hours as needed.    Please drink lots of fluids throughout the day, and please urinate often while awake.    Please keep your appointment with GASTROENTEROLOGY.  I will also get in touch with you sooner with results and possible changes.    There is indeed more to discuss, but let us concentrate on helping you with your current discomfort.  Make sure you are getting lots of rest and sleep.    In the future, we will discuss the possible need to start on VITAMIN D supplementation, perhaps IRON supplementation as well.  For now, we will not start his medications, because the  can give you an upset stomach.    You also have elevated blood sugar, but you are not diabetic.  In the future, this might be playing a role in you feeling miserable.  We will reevaluate in 2 weeks.    You are also due for a bone density test, a mammogram, and perhaps an EKG.    For now, let us concentrate on getting you back on your feet.  See you in 2 weeks, but call or text sooner as needed.  I will send word regarding results and possible changes.  I will wait on the recommendations of GI.    Again, thank you for much for coming.  In the future, we will also discuss your vaccination profile, your mammogram and bone density testing, preventive strategies, Medicare Wellness evaluation.  Do not worry, everything will be done one by one!            0  Return in 2 weeks.  20 minutes please.  Reassess debility.  Coordinate with GI.  Reassess response, recovery.  Consider Medicare Wellness if time will allow.  Consider replenishing vitamin D, iron, bone density, mammogram, vaccination profile.            0

## 2025-06-19 NOTE — PROGRESS NOTES
Subjective   Patient ID: Leola Jennings is a 69 y.o. female who presents for Follow-up (Patient presented today for a 4 week follow up./).    HPI     Review of Systems    Objective   /72 (BP Location: Left arm, Patient Position: Sitting, BP Cuff Size: Adult)   Pulse 107   Ht 1.524 m (5')   Wt 63 kg (138 lb 14.4 oz)   LMP  (LMP Unknown)   SpO2 98%   BMI 27.13 kg/m²     Physical Exam    Assessment/Plan   Diagnoses and all orders for this visit:  Acute pancreatitis, unspecified complication status, unspecified pancreatitis type ([facility])  -     CBC and Auto Differential  -     Comprehensive Metabolic Panel  -     Magnesium  -     Urinalysis with Reflex Culture and Microscopic  -     CT abdomen pelvis wo IV contrast; Future  -     lipase-protease-amylase (Zenpep) 40,000-126,000- 168,000 unit capsule; Please take 2 capsules by mouth before meals 3 times a day, and please take 1 capsule by mouth before snacks twice a day.  Lots of fluids please throughout the day.  Thank you.  Nausea and vomiting, unspecified vomiting type  -     ondansetron ODT (Zofran-ODT) 4 mg disintegrating tablet; Please melt 1 tablet on tongue every 6 hours as needed for nausea.  Lots of fluids please throughout the day.  Thank you.  Inflammatory bowel disease  -     CBC and Auto Differential  -     Comprehensive Metabolic Panel  -     CT abdomen pelvis wo IV contrast; Future  Acute cystitis without hematuria  -     Urinalysis with Reflex Culture and Microscopic  Mixed hyperlipidemia  Blood pressure elevated without history of HTN  -     Follow Up In Primary Care - Established  Prediabetes  Cigarette nicotine dependence without complication  [place] cardiac risk <10% in next 10 years  Comments:  8.8% 06/2025  Iron deficiency anemia, unspecified iron deficiency anemia type  Vitamin D deficiency  Screening for osteoporosis  Breast screening  Debility       Thank you very much for coming.  I am very happy to see you.    I am sorry  that you had been sick for at least 1 week now.  You have had loss of appetite, nausea, vomiting, diarrhea, all consistent with the laboratory results that indicate that you have PANCREATITIS.  You also do have a history of INFLAMMATORY bowel disease and IRON DEFICIENCY ANEMIA.  All of these are likely related.    You may also have a URINE INFECTION.    You will benefit from BLOOD and URINE examinations today.    You will also benefit from a CT SCAN of your abdomen, because it is painful and distended.  Instead of going to the ER, we can go ahead and do some workup now.    Let me send word regarding results and possible changes.    In the meantime, try to eat small frequent meals.  Concentrate on a BLAND DIET, and avoid fat and dairy.  Please get yourself some GATORADE and make sure that you stay HYDRATED.  Drink lots of water as well.    I have given you samples of ZENPEP.  Please take 2 capsules by mouth 15 minutes before breakfast, 15 minutes before lunch, and 15 minutes before supper.  Take 2 capsules by mouth 15 minutes before each meal.    If you are going to have a SNACK in between, go ahead and take 1 capsule by mouth 15 minutes before your snack.  You can do this 2-3 times a day.    If you feel NAUSEOUS, you can melt ONDANSETRON 4 mg tablet on your tongue every 6 hours as needed.    Please drink lots of fluids throughout the day, and please urinate often while awake.    Please keep your appointment with GASTROENTEROLOGY.  I will also get in touch with you sooner with results and possible changes.    There is indeed more to discuss, but let us concentrate on helping you with your current discomfort.  Make sure you are getting lots of rest and sleep.    In the future, we will discuss the possible need to start on VITAMIN D supplementation, perhaps IRON supplementation as well.  For now, we will not start his medications, because the can give you an upset stomach.    You also have elevated blood sugar, but you  are not diabetic.  In the future, this might be playing a role in you feeling miserable.  We will reevaluate in 2 weeks.    You are also due for a bone density test, a mammogram, and perhaps an EKG.    For now, let us concentrate on getting you back on your feet.  See you in 2 weeks, but call or text sooner as needed.  I will send word regarding results and possible changes.  I will wait on the recommendations of GI.    Again, thank you for much for coming.  In the future, we will also discuss your vaccination profile, your mammogram and bone density testing, preventive strategies, Medicare Wellness evaluation.  Do not worry, everything will be done one by one!            0  Return in 2 weeks.  20 minutes please.  Reassess debility.  Coordinate with GI.  Reassess response, recovery.  Consider Medicare Wellness if time will allow.  Consider replenishing vitamin D, iron, bone density, mammogram, vaccination profile.            0

## 2025-06-20 ENCOUNTER — HOSPITAL ENCOUNTER (OUTPATIENT)
Dept: RADIOLOGY | Facility: HOSPITAL | Age: 70
Discharge: HOME | End: 2025-06-20
Payer: MEDICARE

## 2025-06-20 DIAGNOSIS — K85.90 ACUTE PANCREATITIS, UNSPECIFIED COMPLICATION STATUS, UNSPECIFIED PANCREATITIS TYPE (HHS-HCC): ICD-10-CM

## 2025-06-20 DIAGNOSIS — K52.9 INFLAMMATORY BOWEL DISEASE: ICD-10-CM

## 2025-06-20 PROBLEM — F17.210 CIGARETTE NICOTINE DEPENDENCE WITHOUT COMPLICATION: Status: RESOLVED | Noted: 2023-08-15 | Resolved: 2025-06-20

## 2025-06-20 LAB
ALBUMIN SERPL-MCNC: 3 G/DL (ref 3.6–5.1)
ALP SERPL-CCNC: 76 U/L (ref 37–153)
ALT SERPL-CCNC: 7 U/L (ref 6–29)
ANION GAP SERPL CALCULATED.4IONS-SCNC: 12 MMOL/L (CALC) (ref 7–17)
AST SERPL-CCNC: 12 U/L (ref 10–35)
BASOPHILS # BLD AUTO: 51 CELLS/UL (ref 0–200)
BASOPHILS NFR BLD AUTO: 0.4 %
BILIRUB SERPL-MCNC: 0.4 MG/DL (ref 0.2–1.2)
BUN SERPL-MCNC: 12 MG/DL (ref 7–25)
CALCIUM SERPL-MCNC: 8 MG/DL (ref 8.6–10.4)
CHLORIDE SERPL-SCNC: 99 MMOL/L (ref 98–110)
CO2 SERPL-SCNC: 23 MMOL/L (ref 20–32)
CREAT SERPL-MCNC: 0.7 MG/DL (ref 0.6–1)
EGFRCR SERPLBLD CKD-EPI 2021: 93 ML/MIN/1.73M2
EOSINOPHIL # BLD AUTO: 102 CELLS/UL (ref 15–500)
EOSINOPHIL NFR BLD AUTO: 0.8 %
ERYTHROCYTE [DISTWIDTH] IN BLOOD BY AUTOMATED COUNT: 16 % (ref 11–15)
GLUCOSE SERPL-MCNC: 95 MG/DL (ref 65–99)
HCT VFR BLD AUTO: 25.7 % (ref 35–45)
HGB BLD-MCNC: 7.5 G/DL (ref 11.7–15.5)
LYMPHOCYTES # BLD AUTO: 2832 CELLS/UL (ref 850–3900)
LYMPHOCYTES NFR BLD AUTO: 22.3 %
MAGNESIUM SERPL-MCNC: 2.2 MG/DL (ref 1.5–2.5)
MCH RBC QN AUTO: 22.7 PG (ref 27–33)
MCHC RBC AUTO-ENTMCNC: 29.2 G/DL (ref 32–36)
MCV RBC AUTO: 77.6 FL (ref 80–100)
MONOCYTES # BLD AUTO: 876 CELLS/UL (ref 200–950)
MONOCYTES NFR BLD AUTO: 6.9 %
NEUTROPHILS # BLD AUTO: 8839 CELLS/UL (ref 1500–7800)
NEUTROPHILS NFR BLD AUTO: 69.6 %
PLATELET # BLD AUTO: 577 THOUSAND/UL (ref 140–400)
PMV BLD REES-ECKER: 8.9 FL (ref 7.5–12.5)
POTASSIUM SERPL-SCNC: 3.4 MMOL/L (ref 3.5–5.3)
PROT SERPL-MCNC: 6.6 G/DL (ref 6.1–8.1)
RBC # BLD AUTO: 3.31 MILLION/UL (ref 3.8–5.1)
SODIUM SERPL-SCNC: 134 MMOL/L (ref 135–146)
WBC # BLD AUTO: 12.7 THOUSAND/UL (ref 3.8–10.8)

## 2025-06-20 PROCEDURE — 74176 CT ABD & PELVIS W/O CONTRAST: CPT

## 2025-06-21 LAB
APPEARANCE UR: ABNORMAL
BACTERIA #/AREA URNS HPF: ABNORMAL /HPF
BACTERIA UR CULT: ABNORMAL
BACTERIA UR CULT: ABNORMAL
BILIRUB UR QL STRIP: ABNORMAL
COLOR UR: ABNORMAL
GLUCOSE UR QL STRIP: NEGATIVE
HGB UR QL STRIP: NEGATIVE
HYALINE CASTS #/AREA URNS LPF: ABNORMAL /LPF
KETONES UR QL STRIP: ABNORMAL
LEUKOCYTE ESTERASE UR QL STRIP: ABNORMAL
NITRITE UR QL STRIP: NEGATIVE
PH UR STRIP: 6 [PH] (ref 5–8)
PROT UR QL STRIP: ABNORMAL
RBC #/AREA URNS HPF: ABNORMAL /HPF
SERVICE CMNT-IMP: ABNORMAL
SP GR UR STRIP: 1.03 (ref 1–1.03)
SQUAMOUS #/AREA URNS HPF: ABNORMAL /HPF
WBC #/AREA URNS HPF: ABNORMAL /HPF

## 2025-06-23 NOTE — PROGRESS NOTES
REASON FOR VISIT: Ulcerative colitis    HPI:  Leola Jennings is a 69 y.o. 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  The patient saw Dr. Cha last in the GI clinic in June 2020.  She was last seen for a telemedicine visit by Beverley New CNP in January 2024.  At that time she was being seen for rectal bleeding and possible UC flare.  She has responded to prednisone in the past.  She could not tolerate Lialda due to rash and lip swelling.  GI recommendations in the past were for maintenance therapy with Entyvio infusions.  She has declined to start biologic therapy due to cost and fear of side effects.     She currently reports abdominal discomfort, anorexia, nausea, vomiting of recently ingested food as well as diarrhea history of blood in the stool history or mucus.  Her labs on 6/19/25 - Hgb- 7.5, Ferritin -11, vit D - 19. Hgb A1-C -6.0     Moves her bowels-  Hematochezia, melena, urgency, diarrhea or nocturnal bowel movements  Abdominal pain, nausea, vomiting, reflux or dysphagia  Weight and appetite are stable  Denies eye pain, visual changes, joint pain or swelling, skin rashes, lesions or painful nodules or oral sores      PMH-ulcerative proctosigmoiditis, DVT, acute pancreatitis, polyarthralgia  PSH -  Fam Hx -heart disease in mother and father, no family history of IBD, colon cancer, other GI malignancies or celiac disease  Soc Hx-former cigarette smoker, denies smokeless tobacco products, denies EtOH use, denies recreational drug use    Med list notable for Zenpep, Zofran     Labs   Lab Results   Component Value Date    WBC 12.7 (H) 06/19/2025    HGB 7.5 (L) 06/19/2025    HCT 25.7 (L) 06/19/2025    MCV 77.6 (L) 06/19/2025     (H) 06/19/2025     Lab Results   Component Value Date     (L) 06/19/2025    K 3.4 (L) 06/19/2025    CL 99 06/19/2025    CO2 23 06/19/2025    BUN 12 06/19/2025    CREATININE 0.70 06/19/2025     GLUCOSE 95 06/19/2025    CALCIUM 8.0 (L) 06/19/2025      Lab Results   Component Value Date    ALKPHOS 76 06/19/2025    ALKPHOS 83 05/22/2025    ALKPHOS 64 04/18/2024    BILITOT 0.4 06/19/2025    BILITOT 0.3 05/22/2025    BILITOT 0.3 04/18/2024    BILIDIR 0.1 12/30/2023    PROT 6.6 06/19/2025    PROT 7.4 05/22/2025    PROT 7.1 04/18/2024    ALT 7 06/19/2025    ALT 12 05/22/2025    ALT 8 04/18/2024    AST 12 06/19/2025    AST 14 05/22/2025    AST 11 04/18/2024      Lab Results   Component Value Date    INR 1.0 12/30/2023    INR 1.0 07/17/2019      Lab Results   Component Value Date    IRON 22 (L) 05/22/2025    TIBC 399 05/22/2025      Lab Results   Component Value Date    FERRITIN 11 (L) 05/22/2025      Lab Results   Component Value Date    TSH 1.82 05/22/2025    FREET4 1.26 08/09/2019          No fhx of CRC.       Previous endoscopic evaluation:     Flexible sigmoidoscopy-7/19/2019-Dr. Cha    Preparation of the colon was fair  Congested, erythematous, eroded, friable, granular ulcerated and vascular pattern decreased mucosa from 0 20 cm proximal to the anus.  Biopsied likely ulcerative proctosigmoiditis      2.  Colonoscopy-4/25/2018 -Dr. 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 Cha, is here for follow-up appointment today.  She was last seen by Dr. Cha in the GI clinic in 2020 and seen by Beverley New CNP for a virtual telemedicine visit appointment for hematochezia in  January 2024.  Patient has tried Lialda in the past developed lip swelling and rash.  She was advised a few years ago to begin a biologic and has been Entyvio but was concerned because of cost and potential side effects.  She has taken prednisone in the past.  She is reluctant to begin a biologic.     She is here today with complaints of abdominal pain, nausea, vomiting and hematochezia.  Her recent hematocrit was 7.5 on 6/19/2025.  She has not been treated with any medications for ulcerative colitis.        PLAN  Check - ESR, CRP, TB test  Anemia-would recommend the patient go to the emergency room to be admitted   Recommend EGD and colonoscopy   Recommend biologic such as Entyvio or Remicade  Follow-up in the GI clinic       NATHAN Zimmer-MAGALYS      Date: 6/24/2025  Time: 5:12 PM       [1] No Known Allergies  [2]   Past Medical History:  Diagnosis Date    Acute vaginitis 05/23/2019    Acute vaginitis    Celiac disease ([facility])     Adult celiac disease    Cigarette nicotine dependence without complication 08/15/2023    Encounter for general adult medical examination without abnormal findings 05/23/2019    Routine general medical examination at a health care facility    Encounter for screening for malignant neoplasm of colon 03/15/2021    Screen for colon cancer    Nonscarring hair loss, unspecified 08/09/2019    Hair loss    Personal history of other diseases of the digestive system 03/11/2020    History of rectal bleeding    Personal history of other diseases of the musculoskeletal system and connective tissue     History of rheumatoid arthritis    Personal history of other diseases of the respiratory system 04/08/2020    History of bronchitis    Personal history of other diseases of the respiratory system 04/22/2020    History of upper respiratory infection    Personal history of other specified conditions 07/15/2019    History of abdominal pain   [3]   Past Surgical History:  Procedure Laterality Date     OTHER SURGICAL HISTORY  03/13/2017    Parathyroid    OTHER SURGICAL HISTORY  07/15/2019    Colonoscopy   [4]   Family History  Problem Relation Name Age of Onset    Heart failure Mother      Hypertension Mother      Spondylolysis Mother      Heart attack Father      Other (pace maker) Father      Heart failure Sister      Obesity Sister      Seizures Sister      Hashimoto's thyroiditis Sister      Lupus Sister      Spondylolysis Brother     [5]   Current Outpatient Medications   Medication Sig Dispense Refill    lipase-protease-amylase (Zenpep) 40,000-126,000- 168,000 unit capsule Please take 2 capsules by mouth before meals 3 times a day, and please take 1 capsule by mouth before snacks twice a day.  Lots of fluids please throughout the day.  Thank you. 250 capsule 0    ondansetron ODT (Zofran-ODT) 4 mg disintegrating tablet Please melt 1 tablet on tongue every 6 hours as needed for nausea.  Lots of fluids please throughout the day.  Thank you. 30 tablet 0     No current facility-administered medications for this visit.

## 2025-06-24 ENCOUNTER — APPOINTMENT (OUTPATIENT)
Dept: RADIOLOGY | Facility: HOSPITAL | Age: 70
End: 2025-06-24
Payer: MEDICARE

## 2025-06-24 ENCOUNTER — APPOINTMENT (OUTPATIENT)
Dept: GASTROENTEROLOGY | Facility: CLINIC | Age: 70
End: 2025-06-24
Payer: MEDICARE

## 2025-06-24 ENCOUNTER — APPOINTMENT (OUTPATIENT)
Dept: CARDIOLOGY | Facility: HOSPITAL | Age: 70
End: 2025-06-24
Payer: MEDICARE

## 2025-06-24 ENCOUNTER — HOSPITAL ENCOUNTER (INPATIENT)
Facility: HOSPITAL | Age: 70
End: 2025-06-24
Attending: EMERGENCY MEDICINE | Admitting: INTERNAL MEDICINE
Payer: MEDICARE

## 2025-06-24 ENCOUNTER — TELEPHONE (OUTPATIENT)
Dept: GASTROENTEROLOGY | Facility: CLINIC | Age: 70
End: 2025-06-24

## 2025-06-24 DIAGNOSIS — I82.4Y9 DEEP VEIN THROMBOSIS (DVT) OF PROXIMAL LOWER EXTREMITY, UNSPECIFIED CHRONICITY, UNSPECIFIED LATERALITY: ICD-10-CM

## 2025-06-24 DIAGNOSIS — I82.429: ICD-10-CM

## 2025-06-24 DIAGNOSIS — I82.220 IVC THROMBOSIS (MULTI): ICD-10-CM

## 2025-06-24 DIAGNOSIS — R93.3 ABNORMAL FINDINGS ON DIAGNOSTIC IMAGING OF OTHER PARTS OF DIGESTIVE TRACT: ICD-10-CM

## 2025-06-24 DIAGNOSIS — Z86.72 PERSONAL HISTORY OF THROMBOPHLEBITIS: ICD-10-CM

## 2025-06-24 DIAGNOSIS — K52.9 COLITIS: ICD-10-CM

## 2025-06-24 DIAGNOSIS — D50.9 IRON DEFICIENCY ANEMIA, UNSPECIFIED IRON DEFICIENCY ANEMIA TYPE: ICD-10-CM

## 2025-06-24 DIAGNOSIS — D50.0 BLOOD LOSS ANEMIA: ICD-10-CM

## 2025-06-24 DIAGNOSIS — G89.18 ACUTE POSTOPERATIVE PAIN OF GROIN, RIGHT: ICD-10-CM

## 2025-06-24 DIAGNOSIS — K52.9 INFLAMMATORY BOWEL DISEASE: ICD-10-CM

## 2025-06-24 DIAGNOSIS — I97.621 POSTPROCEDURAL HEMATOMA OF A CIRCULATORY SYSTEM ORGAN OR STRUCTURE FOLLOWING OTHER PROCEDURE: ICD-10-CM

## 2025-06-24 DIAGNOSIS — K51.919 ULCERATIVE COLITIS WITH COMPLICATION, UNSPECIFIED LOCATION (MULTI): Primary | ICD-10-CM

## 2025-06-24 DIAGNOSIS — R10.31 ACUTE POSTOPERATIVE PAIN OF GROIN, RIGHT: ICD-10-CM

## 2025-06-24 DIAGNOSIS — E87.6 HYPOKALEMIA: ICD-10-CM

## 2025-06-24 LAB
ABO GROUP (TYPE) IN BLOOD: NORMAL
ABO GROUP (TYPE) IN BLOOD: NORMAL
ALBUMIN SERPL BCP-MCNC: 2.8 G/DL (ref 3.4–5)
ALP SERPL-CCNC: 75 U/L (ref 33–136)
ALT SERPL W P-5'-P-CCNC: 8 U/L (ref 7–45)
ANION GAP SERPL CALC-SCNC: 12 MMOL/L (ref 10–20)
ANTIBODY SCREEN: NORMAL
APPEARANCE UR: CLEAR
AST SERPL W P-5'-P-CCNC: 10 U/L (ref 9–39)
ATRIAL RATE: 93 BPM
BASOPHILS # BLD AUTO: 0.03 X10*3/UL (ref 0–0.1)
BASOPHILS NFR BLD AUTO: 0.2 %
BILIRUB DIRECT SERPL-MCNC: 0.1 MG/DL (ref 0–0.3)
BILIRUB SERPL-MCNC: 0.4 MG/DL (ref 0–1.2)
BILIRUB UR STRIP.AUTO-MCNC: NEGATIVE MG/DL
BUN SERPL-MCNC: 11 MG/DL (ref 6–23)
CALCIUM SERPL-MCNC: 7.9 MG/DL (ref 8.6–10.3)
CARDIAC TROPONIN I PNL SERPL HS: 6 NG/L (ref 0–13)
CHLORIDE SERPL-SCNC: 102 MMOL/L (ref 98–107)
CO2 SERPL-SCNC: 24 MMOL/L (ref 21–32)
COLOR UR: COLORLESS
CREAT SERPL-MCNC: 0.77 MG/DL (ref 0.5–1.05)
CRP SERPL-MCNC: 9.14 MG/DL
EGFRCR SERPLBLD CKD-EPI 2021: 83 ML/MIN/1.73M*2
EOSINOPHIL # BLD AUTO: 0.06 X10*3/UL (ref 0–0.7)
EOSINOPHIL NFR BLD AUTO: 0.5 %
ERYTHROCYTE [DISTWIDTH] IN BLOOD BY AUTOMATED COUNT: 17.4 % (ref 11.5–14.5)
ERYTHROCYTE [SEDIMENTATION RATE] IN BLOOD BY WESTERGREN METHOD: 31 MM/H (ref 0–30)
GLUCOSE SERPL-MCNC: 105 MG/DL (ref 74–99)
GLUCOSE UR STRIP.AUTO-MCNC: NORMAL MG/DL
HCT VFR BLD AUTO: 21.8 % (ref 36–46)
HGB BLD-MCNC: 6.5 G/DL (ref 12–16)
IMM GRANULOCYTES # BLD AUTO: 0.11 X10*3/UL (ref 0–0.7)
IMM GRANULOCYTES NFR BLD AUTO: 0.9 % (ref 0–0.9)
INR PPP: 1.3 (ref 0.9–1.1)
KETONES UR STRIP.AUTO-MCNC: ABNORMAL MG/DL
LACTATE SERPL-SCNC: 1.9 MMOL/L (ref 0.4–2)
LEUKOCYTE ESTERASE UR QL STRIP.AUTO: NEGATIVE
LIPASE SERPL-CCNC: 24 U/L (ref 9–82)
LYMPHOCYTES # BLD AUTO: 2.96 X10*3/UL (ref 1.2–4.8)
LYMPHOCYTES NFR BLD AUTO: 24.3 %
MCH RBC QN AUTO: 22.2 PG (ref 26–34)
MCHC RBC AUTO-ENTMCNC: 29.8 G/DL (ref 32–36)
MCV RBC AUTO: 74 FL (ref 80–100)
MONOCYTES # BLD AUTO: 0.96 X10*3/UL (ref 0.1–1)
MONOCYTES NFR BLD AUTO: 7.9 %
NEUTROPHILS # BLD AUTO: 8.06 X10*3/UL (ref 1.2–7.7)
NEUTROPHILS NFR BLD AUTO: 66.2 %
NITRITE UR QL STRIP.AUTO: NEGATIVE
NRBC BLD-RTO: 0.6 /100 WBCS (ref 0–0)
P AXIS: 71 DEGREES
P OFFSET: 178 MS
P ONSET: 127 MS
PH UR STRIP.AUTO: 7 [PH]
PLATELET # BLD AUTO: 438 X10*3/UL (ref 150–450)
POLYCHROMASIA BLD QL SMEAR: NORMAL
POTASSIUM SERPL-SCNC: 2.9 MMOL/L (ref 3.5–5.3)
PR INTERVAL: 164 MS
PROT SERPL-MCNC: 6.5 G/DL (ref 6.4–8.2)
PROT UR STRIP.AUTO-MCNC: NEGATIVE MG/DL
PROTHROMBIN TIME: 14.7 SECONDS (ref 9.8–12.4)
Q ONSET: 209 MS
QRS COUNT: 15 BEATS
QRS DURATION: 92 MS
QT INTERVAL: 396 MS
QTC CALCULATION(BAZETT): 492 MS
QTC FREDERICIA: 458 MS
R AXIS: 12 DEGREES
RBC # BLD AUTO: 2.93 X10*6/UL (ref 4–5.2)
RBC # UR STRIP.AUTO: NEGATIVE MG/DL
RBC MORPH BLD: NORMAL
RH FACTOR (ANTIGEN D): NORMAL
RH FACTOR (ANTIGEN D): NORMAL
SODIUM SERPL-SCNC: 135 MMOL/L (ref 136–145)
SP GR UR STRIP.AUTO: 1.03
T AXIS: 47 DEGREES
T OFFSET: 407 MS
UROBILINOGEN UR STRIP.AUTO-MCNC: NORMAL MG/DL
VENTRICULAR RATE: 93 BPM
WBC # BLD AUTO: 12.2 X10*3/UL (ref 4.4–11.3)

## 2025-06-24 PROCEDURE — 80048 BASIC METABOLIC PNL TOTAL CA: CPT | Performed by: NURSE PRACTITIONER

## 2025-06-24 PROCEDURE — 85025 COMPLETE CBC W/AUTO DIFF WBC: CPT | Performed by: NURSE PRACTITIONER

## 2025-06-24 PROCEDURE — 93970 EXTREMITY STUDY: CPT

## 2025-06-24 PROCEDURE — 2500000004 HC RX 250 GENERAL PHARMACY W/ HCPCS (ALT 636 FOR OP/ED): Performed by: NURSE PRACTITIONER

## 2025-06-24 PROCEDURE — 83690 ASSAY OF LIPASE: CPT | Performed by: NURSE PRACTITIONER

## 2025-06-24 PROCEDURE — 86923 COMPATIBILITY TEST ELECTRIC: CPT

## 2025-06-24 PROCEDURE — 36430 TRANSFUSION BLD/BLD COMPNT: CPT

## 2025-06-24 PROCEDURE — 85652 RBC SED RATE AUTOMATED: CPT | Performed by: INTERNAL MEDICINE

## 2025-06-24 PROCEDURE — 1200000002 HC GENERAL ROOM WITH TELEMETRY DAILY

## 2025-06-24 PROCEDURE — 96374 THER/PROPH/DIAG INJ IV PUSH: CPT

## 2025-06-24 PROCEDURE — 86901 BLOOD TYPING SEROLOGIC RH(D): CPT | Performed by: NURSE PRACTITIONER

## 2025-06-24 PROCEDURE — 83605 ASSAY OF LACTIC ACID: CPT | Performed by: NURSE PRACTITIONER

## 2025-06-24 PROCEDURE — 36415 COLL VENOUS BLD VENIPUNCTURE: CPT | Performed by: NURSE PRACTITIONER

## 2025-06-24 PROCEDURE — 99223 1ST HOSP IP/OBS HIGH 75: CPT | Performed by: INTERNAL MEDICINE

## 2025-06-24 PROCEDURE — P9016 RBC LEUKOCYTES REDUCED: HCPCS

## 2025-06-24 PROCEDURE — 81003 URINALYSIS AUTO W/O SCOPE: CPT | Performed by: NURSE PRACTITIONER

## 2025-06-24 PROCEDURE — 74177 CT ABD & PELVIS W/CONTRAST: CPT

## 2025-06-24 PROCEDURE — 86140 C-REACTIVE PROTEIN: CPT | Performed by: INTERNAL MEDICINE

## 2025-06-24 PROCEDURE — 96361 HYDRATE IV INFUSION ADD-ON: CPT

## 2025-06-24 PROCEDURE — 2550000001 HC RX 255 CONTRASTS: Performed by: NURSE PRACTITIONER

## 2025-06-24 PROCEDURE — 82248 BILIRUBIN DIRECT: CPT | Performed by: NURSE PRACTITIONER

## 2025-06-24 PROCEDURE — 74177 CT ABD & PELVIS W/CONTRAST: CPT | Performed by: RADIOLOGY

## 2025-06-24 PROCEDURE — 96375 TX/PRO/DX INJ NEW DRUG ADDON: CPT

## 2025-06-24 PROCEDURE — 93005 ELECTROCARDIOGRAM TRACING: CPT

## 2025-06-24 PROCEDURE — 85610 PROTHROMBIN TIME: CPT | Performed by: NURSE PRACTITIONER

## 2025-06-24 PROCEDURE — 84484 ASSAY OF TROPONIN QUANT: CPT | Performed by: NURSE PRACTITIONER

## 2025-06-24 PROCEDURE — 93971 EXTREMITY STUDY: CPT | Mod: FOREIGN READ | Performed by: RADIOLOGY

## 2025-06-24 PROCEDURE — 2500000004 HC RX 250 GENERAL PHARMACY W/ HCPCS (ALT 636 FOR OP/ED): Mod: JZ | Performed by: INTERNAL MEDICINE

## 2025-06-24 PROCEDURE — 99291 CRITICAL CARE FIRST HOUR: CPT | Performed by: EMERGENCY MEDICINE

## 2025-06-24 RX ORDER — POTASSIUM CHLORIDE 14.9 MG/ML
20 INJECTION INTRAVENOUS
Status: COMPLETED | OUTPATIENT
Start: 2025-06-24 | End: 2025-06-24

## 2025-06-24 RX ORDER — SODIUM CHLORIDE 9 MG/ML
100 INJECTION, SOLUTION INTRAVENOUS CONTINUOUS
Status: ACTIVE | OUTPATIENT
Start: 2025-06-24 | End: 2025-06-25

## 2025-06-24 RX ORDER — MORPHINE SULFATE 4 MG/ML
4 INJECTION, SOLUTION INTRAMUSCULAR; INTRAVENOUS ONCE
Status: COMPLETED | OUTPATIENT
Start: 2025-06-24 | End: 2025-06-24

## 2025-06-24 RX ORDER — ONDANSETRON HYDROCHLORIDE 2 MG/ML
4 INJECTION, SOLUTION INTRAVENOUS ONCE
Status: COMPLETED | OUTPATIENT
Start: 2025-06-24 | End: 2025-06-24

## 2025-06-24 RX ORDER — ONDANSETRON HYDROCHLORIDE 2 MG/ML
4 INJECTION, SOLUTION INTRAVENOUS EVERY 8 HOURS PRN
Status: DISCONTINUED | OUTPATIENT
Start: 2025-06-24 | End: 2025-07-05 | Stop reason: HOSPADM

## 2025-06-24 RX ORDER — ENOXAPARIN SODIUM 100 MG/ML
40 INJECTION SUBCUTANEOUS EVERY 24 HOURS
Status: DISCONTINUED | OUTPATIENT
Start: 2025-06-24 | End: 2025-07-02

## 2025-06-24 RX ORDER — ONDANSETRON 4 MG/1
4 TABLET, ORALLY DISINTEGRATING ORAL EVERY 8 HOURS PRN
Status: DISCONTINUED | OUTPATIENT
Start: 2025-06-24 | End: 2025-07-05 | Stop reason: HOSPADM

## 2025-06-24 RX ADMIN — SODIUM CHLORIDE 1000 ML: 0.9 INJECTION, SOLUTION INTRAVENOUS at 13:52

## 2025-06-24 RX ADMIN — POTASSIUM CHLORIDE 20 MEQ: 14.9 INJECTION, SOLUTION INTRAVENOUS at 16:33

## 2025-06-24 RX ADMIN — ONDANSETRON 4 MG: 2 INJECTION INTRAMUSCULAR; INTRAVENOUS at 13:52

## 2025-06-24 RX ADMIN — ENOXAPARIN SODIUM 40 MG: 40 INJECTION SUBCUTANEOUS at 21:11

## 2025-06-24 RX ADMIN — MORPHINE SULFATE 4 MG: 4 INJECTION, SOLUTION INTRAMUSCULAR; INTRAVENOUS at 13:52

## 2025-06-24 RX ADMIN — METHYLPREDNISOLONE SODIUM SUCCINATE 40 MG: 40 INJECTION, POWDER, FOR SOLUTION INTRAMUSCULAR; INTRAVENOUS at 21:11

## 2025-06-24 RX ADMIN — SODIUM CHLORIDE 100 ML/HR: 0.9 INJECTION, SOLUTION INTRAVENOUS at 20:49

## 2025-06-24 RX ADMIN — POTASSIUM CHLORIDE 20 MEQ: 14.9 INJECTION, SOLUTION INTRAVENOUS at 18:50

## 2025-06-24 RX ADMIN — IOHEXOL 75 ML: 350 INJECTION, SOLUTION INTRAVENOUS at 14:48

## 2025-06-24 RX ADMIN — SODIUM CHLORIDE 100 ML/HR: 0.9 INJECTION, SOLUTION INTRAVENOUS at 16:32

## 2025-06-24 SDOH — SOCIAL STABILITY: SOCIAL INSECURITY: HAVE YOU HAD THOUGHTS OF HARMING ANYONE ELSE?: NO

## 2025-06-24 SDOH — ECONOMIC STABILITY: FOOD INSECURITY: WITHIN THE PAST 12 MONTHS, YOU WORRIED THAT YOUR FOOD WOULD RUN OUT BEFORE YOU GOT THE MONEY TO BUY MORE.: NEVER TRUE

## 2025-06-24 SDOH — SOCIAL STABILITY: SOCIAL INSECURITY: DOES ANYONE TRY TO KEEP YOU FROM HAVING/CONTACTING OTHER FRIENDS OR DOING THINGS OUTSIDE YOUR HOME?: NO

## 2025-06-24 SDOH — SOCIAL STABILITY: SOCIAL INSECURITY: WITHIN THE LAST YEAR, HAVE YOU BEEN HUMILIATED OR EMOTIONALLY ABUSED IN OTHER WAYS BY YOUR PARTNER OR EX-PARTNER?: NO

## 2025-06-24 SDOH — SOCIAL STABILITY: SOCIAL INSECURITY: HAS ANYONE EVER THREATENED TO HURT YOUR FAMILY OR YOUR PETS?: NO

## 2025-06-24 SDOH — SOCIAL STABILITY: SOCIAL INSECURITY: ARE YOU OR HAVE YOU BEEN THREATENED OR ABUSED PHYSICALLY, EMOTIONALLY, OR SEXUALLY BY ANYONE?: NO

## 2025-06-24 SDOH — ECONOMIC STABILITY: HOUSING INSECURITY: AT ANY TIME IN THE PAST 12 MONTHS, WERE YOU HOMELESS OR LIVING IN A SHELTER (INCLUDING NOW)?: NO

## 2025-06-24 SDOH — ECONOMIC STABILITY: FOOD INSECURITY: HOW HARD IS IT FOR YOU TO PAY FOR THE VERY BASICS LIKE FOOD, HOUSING, MEDICAL CARE, AND HEATING?: NOT HARD AT ALL

## 2025-06-24 SDOH — SOCIAL STABILITY: SOCIAL INSECURITY: ABUSE: ADULT

## 2025-06-24 SDOH — ECONOMIC STABILITY: HOUSING INSECURITY: IN THE PAST 12 MONTHS, HOW MANY TIMES HAVE YOU MOVED WHERE YOU WERE LIVING?: 0

## 2025-06-24 SDOH — ECONOMIC STABILITY: FOOD INSECURITY: WITHIN THE PAST 12 MONTHS, THE FOOD YOU BOUGHT JUST DIDN'T LAST AND YOU DIDN'T HAVE MONEY TO GET MORE.: NEVER TRUE

## 2025-06-24 SDOH — SOCIAL STABILITY: SOCIAL INSECURITY: WERE YOU ABLE TO COMPLETE ALL THE BEHAVIORAL HEALTH SCREENINGS?: YES

## 2025-06-24 SDOH — SOCIAL STABILITY: SOCIAL INSECURITY: WITHIN THE LAST YEAR, HAVE YOU BEEN AFRAID OF YOUR PARTNER OR EX-PARTNER?: NO

## 2025-06-24 SDOH — ECONOMIC STABILITY: INCOME INSECURITY: IN THE PAST 12 MONTHS HAS THE ELECTRIC, GAS, OIL, OR WATER COMPANY THREATENED TO SHUT OFF SERVICES IN YOUR HOME?: NO

## 2025-06-24 SDOH — ECONOMIC STABILITY: HOUSING INSECURITY: IN THE LAST 12 MONTHS, WAS THERE A TIME WHEN YOU WERE NOT ABLE TO PAY THE MORTGAGE OR RENT ON TIME?: NO

## 2025-06-24 SDOH — SOCIAL STABILITY: SOCIAL INSECURITY: ARE THERE ANY APPARENT SIGNS OF INJURIES/BEHAVIORS THAT COULD BE RELATED TO ABUSE/NEGLECT?: NO

## 2025-06-24 SDOH — SOCIAL STABILITY: SOCIAL INSECURITY
WITHIN THE LAST YEAR, HAVE YOU BEEN RAPED OR FORCED TO HAVE ANY KIND OF SEXUAL ACTIVITY BY YOUR PARTNER OR EX-PARTNER?: NO

## 2025-06-24 SDOH — SOCIAL STABILITY: SOCIAL INSECURITY
WITHIN THE LAST YEAR, HAVE YOU BEEN KICKED, HIT, SLAPPED, OR OTHERWISE PHYSICALLY HURT BY YOUR PARTNER OR EX-PARTNER?: NO

## 2025-06-24 SDOH — SOCIAL STABILITY: SOCIAL INSECURITY: DO YOU FEEL ANYONE HAS EXPLOITED OR TAKEN ADVANTAGE OF YOU FINANCIALLY OR OF YOUR PERSONAL PROPERTY?: NO

## 2025-06-24 SDOH — SOCIAL STABILITY: SOCIAL INSECURITY: DO YOU FEEL UNSAFE GOING BACK TO THE PLACE WHERE YOU ARE LIVING?: NO

## 2025-06-24 SDOH — SOCIAL STABILITY: SOCIAL INSECURITY: HAVE YOU HAD ANY THOUGHTS OF HARMING ANYONE ELSE?: NO

## 2025-06-24 SDOH — ECONOMIC STABILITY: TRANSPORTATION INSECURITY: IN THE PAST 12 MONTHS, HAS LACK OF TRANSPORTATION KEPT YOU FROM MEDICAL APPOINTMENTS OR FROM GETTING MEDICATIONS?: NO

## 2025-06-24 ASSESSMENT — COGNITIVE AND FUNCTIONAL STATUS - GENERAL
MOVING FROM LYING ON BACK TO SITTING ON SIDE OF FLAT BED WITH BEDRAILS: A LITTLE
CLIMB 3 TO 5 STEPS WITH RAILING: A LOT
DRESSING REGULAR UPPER BODY CLOTHING: A LITTLE
WALKING IN HOSPITAL ROOM: A LITTLE
DAILY ACTIVITIY SCORE: 24
WALKING IN HOSPITAL ROOM: A LITTLE
MOVING TO AND FROM BED TO CHAIR: A LITTLE
MOBILITY SCORE: 21
STANDING UP FROM CHAIR USING ARMS: A LITTLE
CLIMB 3 TO 5 STEPS WITH RAILING: A LITTLE
DRESSING REGULAR LOWER BODY CLOTHING: A LITTLE
PERSONAL GROOMING: A LITTLE
DAILY ACTIVITIY SCORE: 19
HELP NEEDED FOR BATHING: A LITTLE
MOBILITY SCORE: 17
MOVING TO AND FROM BED TO CHAIR: A LITTLE
TURNING FROM BACK TO SIDE WHILE IN FLAT BAD: A LITTLE
TOILETING: A LITTLE
PATIENT BASELINE BEDBOUND: NO

## 2025-06-24 ASSESSMENT — PAIN SCALES - GENERAL
PAINLEVEL_OUTOF10: 2
PAINLEVEL_OUTOF10: 8
PAINLEVEL_OUTOF10: 0 - NO PAIN
PAINLEVEL_OUTOF10: 8
PAINLEVEL_OUTOF10: 0 - NO PAIN

## 2025-06-24 ASSESSMENT — LIFESTYLE VARIABLES
SKIP TO QUESTIONS 9-10: 1
EVER FELT BAD OR GUILTY ABOUT YOUR DRINKING: NO
HAVE PEOPLE ANNOYED YOU BY CRITICIZING YOUR DRINKING: NO
AUDIT-C TOTAL SCORE: 0
HAVE YOU EVER FELT YOU SHOULD CUT DOWN ON YOUR DRINKING: NO
HOW OFTEN DO YOU HAVE A DRINK CONTAINING ALCOHOL: NEVER
HOW MANY STANDARD DRINKS CONTAINING ALCOHOL DO YOU HAVE ON A TYPICAL DAY: PATIENT DOES NOT DRINK
HOW OFTEN DO YOU HAVE 6 OR MORE DRINKS ON ONE OCCASION: NEVER
EVER HAD A DRINK FIRST THING IN THE MORNING TO STEADY YOUR NERVES TO GET RID OF A HANGOVER: NO
TOTAL SCORE: 0
AUDIT-C TOTAL SCORE: 0

## 2025-06-24 ASSESSMENT — ACTIVITIES OF DAILY LIVING (ADL)
FEEDING YOURSELF: INDEPENDENT
DRESSING YOURSELF: INDEPENDENT
PATIENT'S MEMORY ADEQUATE TO SAFELY COMPLETE DAILY ACTIVITIES?: YES
LACK_OF_TRANSPORTATION: NO
JUDGMENT_ADEQUATE_SAFELY_COMPLETE_DAILY_ACTIVITIES: YES
ADEQUATE_TO_COMPLETE_ADL: YES
HEARING - RIGHT EAR: FUNCTIONAL
ASSISTIVE_DEVICE: EYEGLASSES
WALKS IN HOME: INDEPENDENT
HEARING - LEFT EAR: FUNCTIONAL
GROOMING: INDEPENDENT
BATHING: INDEPENDENT
TOILETING: INDEPENDENT
LACK_OF_TRANSPORTATION: NO

## 2025-06-24 ASSESSMENT — PAIN - FUNCTIONAL ASSESSMENT
PAIN_FUNCTIONAL_ASSESSMENT: 0-10

## 2025-06-24 ASSESSMENT — PAIN DESCRIPTION - PAIN TYPE: TYPE: ACUTE PAIN

## 2025-06-24 ASSESSMENT — PAIN DESCRIPTION - LOCATION
LOCATION: OTHER (COMMENT)
LOCATION: ABDOMEN

## 2025-06-24 ASSESSMENT — PATIENT HEALTH QUESTIONNAIRE - PHQ9
SUM OF ALL RESPONSES TO PHQ9 QUESTIONS 1 & 2: 0
1. LITTLE INTEREST OR PLEASURE IN DOING THINGS: NOT AT ALL
2. FEELING DOWN, DEPRESSED OR HOPELESS: NOT AT ALL

## 2025-06-24 ASSESSMENT — PAIN DESCRIPTION - PROGRESSION: CLINICAL_PROGRESSION: RAPIDLY IMPROVING

## 2025-06-24 NOTE — Clinical Note
Huddle and Timeout completed together with team. Patient wristband and DEWEY information verified.  Anesthesia safety check completed. Patient was

## 2025-06-24 NOTE — H&P
Medical Group History and Physical    ASSESSMENT/PLAN:     Acute blood loss anemia, symptomatic  GI bleed  Ulcerative colitis  -having increased frequency of loose, bloody stool and associated abdominal pain  -hgb down to 6.5; baseline ~9-10  -transfusing 2uPRBC in ED  -CT abd/pel showing nonspecific mild colitis from the hepatic flexure down through the rectum probably consistent with her known UC  -has mostly been on steroids previously, has refused biologics  -looks like most recently she was on mesalamine with GI at [facility]  -start steroids  -GI consult  -clear liquid diet for now  -check ESR, CRP, calprotectin    Acute non-occlusive DVT  -involving the right extrarenal iliac vein, right common iliac vein, infrarenal IVC  -likely related to her IBD  -don't think we can fully anticoagulate at this point given her significant symptomatic anemia and GI bleeding  -will get formal bilateral duplex  -I will give prophylactic dose lovenox for now and follow her stool  -will have vascular see; would see if IVC filter can be placed but not sure if possible with IVC involvement; ? Whether thrombectomy is a possibility    Hypokalemia  -replete    HTN  HLD  -hold antihypertensives for now          VTE Prophylaxis: lovenox      HISTORY OF PRESENT ILLNESS:   Chief Complaint: anemia    History Of Present Illness:    Leola Jennings is a 69 y.o. female with a significant past medical history of ulcerative colitis, hypertension, question of rheumatoid arthritis, hyperlipidemia, who presents to the emergency department with 2 weeks of progressive abdominal pain, bloody and mucousy stools.  She has been having near syncopal episodes and generalized weakness.  Her symptoms have continued to get worse.  Abdominal pain is diffuse crampy.  She has a known history of ulcerative colitis and has been on steroid tapers in the past.  She has tried a couple of Biologics but most recently has refused these.  Looks like most recently last  year she was on mesalamine at some point.  She denies any chest pain or shortness of breath.  No fevers or chills.  No dysuria or frequency though she was recently treated for urinary tract infection.       Review of systems: 10 point review of systems is otherwise negative except as mentioned above.    PAST HISTORIES:       Past Medical History:  She has a past medical history of Acute vaginitis (05/23/2019), Celiac disease ([facility]), Cigarette nicotine dependence without complication (08/15/2023), Encounter for general adult medical examination without abnormal findings (05/23/2019), Encounter for screening for malignant neoplasm of colon (03/15/2021), Nonscarring hair loss, unspecified (08/09/2019), Personal history of other diseases of the digestive system (03/11/2020), Personal history of other diseases of the musculoskeletal system and connective tissue, Personal history of other diseases of the respiratory system (04/08/2020), Personal history of other diseases of the respiratory system (04/22/2020), and Personal history of other specified conditions (07/15/2019).    Past Surgical History:  She has a past surgical history that includes Other surgical history (03/13/2017) and Other surgical history (07/15/2019).      Social History:  She reports that she has quit smoking. Her smoking use included cigarettes. She started smoking about 6 years ago. She has never used smokeless tobacco. She reports that she does not currently use alcohol. She reports that she does not use drugs.    Family History:  Family History[1]     Allergies:  Patient has no known allergies.      OBJECTIVE:       Last Recorded Vitals:  Vitals:    06/24/25 1250 06/24/25 1345 06/24/25 1500 06/24/25 1620   BP: 119/63 134/70 139/58 120/69   BP Location:  Left arm Left arm Left arm   Patient Position:  Sitting Sitting Sitting   Pulse: (!) 101 91 (!) 103 93   Resp: 20 20 20 20   Temp: 36.9 °C (98.4 °F)      TempSrc: Temporal      SpO2: 98% 97% 99%  100%   Weight: 62.6 kg (138 lb)      Height: 1.524 m (5')          Last I/O:  No intake/output data recorded.    Physical Exam  GEN: ill appearing; pale  HEENT: NCAT, PERRLA, EOMI, moist mucous membranes  NECK: supple, no masses  CV: RRR, no m/r/g, no LE edema  LUNGS: CTAB, no w/r/c  ABD: soft, mild diffuse TTP without rebound or guarding  SKIN: no rashes  MSK; no gross deformities, normal joints  NEURO: A+Ox3, no FND  PSYCH: appropriate mood, affect      Inpatient Medications:  Scheduled Medications[2]  PRN Medications[3]    Outpatient Medications:  Prior to Admission medications    Medication Sig Start Date End Date Taking? Authorizing Provider   lipase-protease-amylase (Zenpep) 40,000-126,000- 168,000 unit capsule Please take 2 capsules by mouth before meals 3 times a day, and please take 1 capsule by mouth before snacks twice a day.  Lots of fluids please throughout the day.  Thank you. 6/19/25   Cresencio Zavala MD   ondansetron ODT (Zofran-ODT) 4 mg disintegrating tablet Please melt 1 tablet on tongue every 6 hours as needed for nausea.  Lots of fluids please throughout the day.  Thank you. 6/19/25   Cresencio Zavala MD       LABS AND IMAGING:     Last Labs:  CBC - 6/24/2025:  1:44 PM  12.2 6.5 438    21.8      CMP - 6/24/2025:  1:44 PM  7.9 6.5 10 --- 0.4   _ 2.8 8 75      PTT - No results in last year.  1.3   14.7 _     Troponin I, High Sensitivity   Date/Time Value Ref Range Status   06/24/2025 01:44 PM 6 0 - 13 ng/L Final     HEMOGLOBIN A1c   Date/Time Value Ref Range Status   05/22/2025 10:40 AM 6.0 (H) <5.7 % Final     Comment:     For someone without known diabetes, a hemoglobin   A1c value between 5.7% and 6.4% is consistent with  prediabetes and should be confirmed with a   follow-up test.     For someone with known diabetes, a value <7%  indicates that their diabetes is well controlled. A1c  targets should be individualized based on duration of  diabetes, age, comorbid conditions, and  other  considerations.     This assay result is consistent with an increased risk  of diabetes.     Currently, no consensus exists regarding use of  hemoglobin A1c for diagnosis of diabetes for children.          Hemoglobin A1C   Date/Time Value Ref Range Status   08/16/2023 11:05 AM 5.6 % Final     Comment:          Diagnosis of Diabetes-Adults   Non-Diabetic: < or = 5.6%   Increased risk for developing diabetes: 5.7-6.4%   Diagnostic of diabetes: > or = 6.5%  .       Monitoring of Diabetes                Age (y)     Therapeutic Goal (%)   Adults:          >18           <7.0   Pediatrics:    13-18           <7.5                   7-12           <8.0                   0- 6            7.5-8.5   American Diabetes Association. Diabetes Care 33(S1), Jan 2010.     LDL-CHOLESTEROL   Date/Time Value Ref Range Status   05/22/2025 10:40  (H) mg/dL (calc) Final     Comment:     Reference range: <100     Desirable range <100 mg/dL for primary prevention;    <70 mg/dL for patients with CHD or diabetic patients   with > or = 2 CHD risk factors.     LDL-C is now calculated using the Edin-Carla   calculation, which is a validated novel method providing   better accuracy than the Friedewald equation in the   estimation of LDL-C.   Edin FLETCHER et al. ALETHA. 2013;310(19): 7243-0202   (http://education.dscovered.Thrill/faq/QHI574)       VLDL   Date/Time Value Ref Range Status   08/16/2023 11:05 AM 54 (H) 0 - 40 mg/dL Final   05/23/2019 10:20 AM 23 0 - 40 mg/dL Final             [1]   Family History  Problem Relation Name Age of Onset    Heart failure Mother      Hypertension Mother      Spondylolysis Mother      Heart attack Father      Other (pace maker) Father      Heart failure Sister      Obesity Sister      Seizures Sister      Hashimoto's thyroiditis Sister      Lupus Sister      Spondylolysis Brother     [2] potassium chloride, 20 mEq, intravenous, q2h    [3]

## 2025-06-24 NOTE — ED PROVIDER NOTES
HPI   Chief Complaint   Patient presents with    Abdominal Pain     Patient has had abdominal pain and bloody diarrhea for a month. Weak and dizzy       69-year-old female presents the emergency department, states 2 weeks of abdominal pain, bloody and mucousy stools.  States she has been trying to manage this with her outpatient team, has had several sets of labs done and imaging but states the abdominal pain as well as the rectal bleeding seems to be getting worse.  Patient reports having history of celiac disease, diverticulitis, and ulcerative colitis    States she has been feeling weaker and more lightheaded.  Went to the bathroom here and had a near syncopal episode      History provided by:  Patient   used: No            Patient History   Medical History[1]  Surgical History[2]  Family History[3]  Social History[4]    Physical Exam   ED Triage Vitals [06/24/25 1250]   Temperature Heart Rate Respirations BP   36.9 °C (98.4 °F) (!) 101 20 119/63      Pulse Ox Temp Source Heart Rate Source Patient Position   98 % Temporal Monitor --      BP Location FiO2 (%)     -- --       Physical Exam  Constitutional: Vitals noted, pale and ill appearing. Afebrile.   Cardiovascular: Regular, rate, rhythm, no murmur.   Pulmonary: Lungs clear bilaterally with good aeration. No adventitious breath sounds.   Gastrointestinal: Soft, nonsurgical. General discomfort. No peritoneal signs. Normoactive bowel sounds.   Musculoskeletal: No peripheral edema. Negative Homans bilaterally, no cords.   Skin: No rash.   Neuro: No focal neurologic deficits, NIH score of 0.      ED Course & MDM   Diagnoses as of 06/24/25 1559   Blood loss anemia   Colitis   Iliac vein thrombosis, unspecified laterality (Multi)   Hypokalemia          Labs Reviewed   CBC WITH AUTO DIFFERENTIAL - Abnormal       Result Value    WBC 12.2 (*)     nRBC 0.6 (*)     RBC 2.93 (*)     Hemoglobin 6.5 (*)     Hematocrit 21.8 (*)     MCV 74 (*)     MCH 22.2  (*)     MCHC 29.8 (*)     RDW 17.4 (*)     Platelets 438      Neutrophils % 66.2      Immature Granulocytes %, Automated 0.9      Lymphocytes % 24.3      Monocytes % 7.9      Eosinophils % 0.5      Basophils % 0.2      Neutrophils Absolute 8.06 (*)     Immature Granulocytes Absolute, Automated 0.11      Lymphocytes Absolute 2.96      Monocytes Absolute 0.96      Eosinophils Absolute 0.06      Basophils Absolute 0.03     BASIC METABOLIC PANEL - Abnormal    Glucose 105 (*)     Sodium 135 (*)     Potassium 2.9 (*)     Chloride 102      Bicarbonate 24      Anion Gap 12      Urea Nitrogen 11      Creatinine 0.77      eGFR 83      Calcium 7.9 (*)    HEPATIC FUNCTION PANEL - Abnormal    Albumin 2.8 (*)     Bilirubin, Total 0.4      Bilirubin, Direct 0.1      Alkaline Phosphatase 75      ALT 8      AST 10      Total Protein 6.5     PROTIME-INR - Abnormal    Protime 14.7 (*)     INR 1.3 (*)    LIPASE - Normal    Lipase 24      Narrative:     Venipuncture immediately after or during the administration of Metamizole may lead to falsely low results. Testing should be performed immediately prior to Metamizole dosing.   LACTATE - Normal    Lactate 1.9      Narrative:     Venipuncture immediately after or during the administration of Metamizole may lead to falsely low results. Testing should be performed immediately prior to Metamizole dosing.   TROPONIN I, HIGH SENSITIVITY - Normal    Troponin I, High Sensitivity 6      Narrative:     Less than 99th percentile of normal range cutoff-  Female and children under 18 years old <14 ng/L; Male <21 ng/L: Negative  Repeat testing should be performed if clinically indicated.     Female and children under 18 years old 14-50 ng/L; Male 21-50 ng/L:  Consistent with possible cardiac damage and possible increased clinical   risk. Serial measurements may help to assess extent of myocardial damage.     >50 ng/L: Consistent with cardiac damage, increased clinical risk and  myocardial infarction.  Serial measurements may help assess extent of   myocardial damage.      NOTE: Children less than 1 year old may have higher baseline troponin   levels and results should be interpreted in conjunction with the overall   clinical context.     NOTE: Troponin I testing is performed using a different   testing methodology at [facility] than at other   [facility]. Direct result comparisons should only   be made within the same method.   URINALYSIS WITH REFLEX CULTURE AND MICROSCOPIC    Narrative:     The following orders were created for panel order Urinalysis with Reflex Culture and Microscopic.  Procedure                               Abnormality         Status                     ---------                               -----------         ------                     Urinalysis with Reflex C...[[id number]]                                                 Extra Urine Gray Tube[[id number]]                                                         Please view results for these tests on the individual orders.   URINALYSIS WITH REFLEX CULTURE AND MICROSCOPIC   EXTRA URINE GRAY TUBE   TYPE AND SCREEN   VERAB/VERIFY ABORH   PREPARE RBC   MORPHOLOGY    RBC Morphology See Below      Polychromasia Mild          CT abdomen pelvis w IV contrast   Final Result   Acute nonocclusive deep venous thrombosis involving the right   external iliac vein, right common iliac vein, and the infrarenal IVC.   Please see above for details.        Nonspecific mild colitis extending from the hepatic flexure down   through the rectum. Differential considerations include inflammatory   bowel disease (ulcer colitis), infectious colitis, and ischemic   colitis. Suggest direct visualization with colonoscopy. Currently   without CT evidence of bowel obstruction or perforation.        Arthritic changes in the lumbosacral spine as described.        Grossly stable mild bronchiectasis at the posteromedial lung bases,   right greater than left.         MACRO:   Emeterio Adriano discussed the significance and urgency of this critical   finding by EPIC secure chat with  PARDEEP GUTIERREZ on 6/24/2025 at 3:14   pm.  (**-F-**) Findings:  See findings.        Signed by: Emeterio Oh 6/24/2025 3:14 PM   Dictation workstation:   [id number]                 No data recorded                         Medical Decision Making    Patient presents with weeks of abdominal pain and rectal bleeding.  She is been following with her doctor who has been monitoring with labs and imaging.  States last couple days she has been having syncopal and near syncopal episodes.  Continues to complain of severe pain.    Workup was initiated, started on a liter of IV fluids, given 4 mg of IV Zofran for nausea and 4 mg of IV morphine for pain.    EKG at 1414 with ventricular at 93, interpreted to me, shows normal sinus rhythm with QT/QTc 396/492, nonspecific T wave abnormality noted, no evidence of acute ischemia.    CBC shows a white count of 12.2 with a hemoglobin of 6.5, significantly downtrending recently.  Platelets 438.  INR 1.3, lactate level 1.9.  Metabolic panel with a glucose of 105, sodium 135, potassium 2.9, creatinine 0.77, unremarkable LFTs.  Lipase 24, troponin 6.    I did obtain CT imaging, she had had dry CT scan recently but I did obtain it with IV contrast.  Radiologist commented that she has an acute nonocclusive DVT involving the right external iliac vein, right common iliac vein and the infrarenal IVC.    Discusses nonspecific mild colitis.    Patient tells me she has a history of DVT, was previously on blood thinners but stopped taking them 20 years ago.    I carefully discussed all results with the patient, ordered her IV potassium repletion and blood transfusion, especially as she is significantly symptomatic.  Consent obtained, 2 units ordered    Spoke with Dr. Hinojosa who accepts the patient to the service, we did discuss ultrasound of bilateral lower extremities to  evaluate for DVT given her history and CT findings.    Shared IZZY Attestation:    I personally saw the patient and made/approved the management plan and take responsibility for the patient management.    History: Patient presenting with abdominal pain and bloody stool.    Exam: Mildly tachycardic but regular rhythm cardiac exam clear breath sounds bilaterally.  Abdomen is tender to palpation with no rebound or guarding.  Neurological exam is grossly intact.  No palpable cords.    MDM:     Differential Diagnosis: Gastritis, colitis, diverticulitis    Labs Reviewed   CBC WITH AUTO DIFFERENTIAL - Abnormal       Result Value    WBC 12.2 (*)     nRBC 0.6 (*)     RBC 2.93 (*)     Hemoglobin 6.5 (*)     Hematocrit 21.8 (*)     MCV 74 (*)     MCH 22.2 (*)     MCHC 29.8 (*)     RDW 17.4 (*)     Platelets 438      Neutrophils % 66.2      Immature Granulocytes %, Automated 0.9      Lymphocytes % 24.3      Monocytes % 7.9      Eosinophils % 0.5      Basophils % 0.2      Neutrophils Absolute 8.06 (*)     Immature Granulocytes Absolute, Automated 0.11      Lymphocytes Absolute 2.96      Monocytes Absolute 0.96      Eosinophils Absolute 0.06      Basophils Absolute 0.03     BASIC METABOLIC PANEL - Abnormal    Glucose 105 (*)     Sodium 135 (*)     Potassium 2.9 (*)     Chloride 102      Bicarbonate 24      Anion Gap 12      Urea Nitrogen 11      Creatinine 0.77      eGFR 83      Calcium 7.9 (*)    HEPATIC FUNCTION PANEL - Abnormal    Albumin 2.8 (*)     Bilirubin, Total 0.4      Bilirubin, Direct 0.1      Alkaline Phosphatase 75      ALT 8      AST 10      Total Protein 6.5     PROTIME-INR - Abnormal    Protime 14.7 (*)     INR 1.3 (*)    LIPASE - Normal    Lipase 24      Narrative:     Venipuncture immediately after or during the administration of Metamizole may lead to falsely low results. Testing should be performed immediately prior to Metamizole dosing.   LACTATE - Normal    Lactate 1.9      Narrative:     Venipuncture  immediately after or during the administration of Metamizole may lead to falsely low results. Testing should be performed immediately prior to Metamizole dosing.   TROPONIN I, HIGH SENSITIVITY - Normal    Troponin I, High Sensitivity 6      Narrative:     Less than 99th percentile of normal range cutoff-  Female and children under 18 years old <14 ng/L; Male <21 ng/L: Negative  Repeat testing should be performed if clinically indicated.     Female and children under 18 years old 14-50 ng/L; Male 21-50 ng/L:  Consistent with possible cardiac damage and possible increased clinical   risk. Serial measurements may help to assess extent of myocardial damage.     >50 ng/L: Consistent with cardiac damage, increased clinical risk and  myocardial infarction. Serial measurements may help assess extent of   myocardial damage.      NOTE: Children less than 1 year old may have higher baseline troponin   levels and results should be interpreted in conjunction with the overall   clinical context.     NOTE: Troponin I testing is performed using a different   testing methodology at [facility] than at other   [facility]. Direct result comparisons should only   be made within the same method.   URINALYSIS WITH REFLEX CULTURE AND MICROSCOPIC    Narrative:     The following orders were created for panel order Urinalysis with Reflex Culture and Microscopic.  Procedure                               Abnormality         Status                     ---------                               -----------         ------                     Urinalysis with Reflex C...[[id number]]                                                 Extra Urine Gray Tube[[id number]]                                                         Please view results for these tests on the individual orders.   URINALYSIS WITH REFLEX CULTURE AND MICROSCOPIC   EXTRA URINE GRAY TUBE   TYPE AND SCREEN   PREPARE RBC   MORPHOLOGY    RBC Morphology See Below       Polychromasia Mild         CT abdomen pelvis w IV contrast   Final Result   Acute nonocclusive deep venous thrombosis involving the right   external iliac vein, right common iliac vein, and the infrarenal IVC.   Please see above for details.        Nonspecific mild colitis extending from the hepatic flexure down   through the rectum. Differential considerations include inflammatory   bowel disease (ulcer colitis), infectious colitis, and ischemic   colitis. Suggest direct visualization with colonoscopy. Currently   without CT evidence of bowel obstruction or perforation.        Arthritic changes in the lumbosacral spine as described.        Grossly stable mild bronchiectasis at the posteromedial lung bases,   right greater than left.        MACRO:   Emeterio Oh discussed the significance and urgency of this critical   finding by EPIC secure chat with  PARDEEP GUTIERREZ on 6/24/2025 at 3:14   pm.  (**-RCF-**) Findings:  See findings.        Signed by: Emeterio Oh 6/24/2025 3:14 PM   Dictation workstation:   [id number]              Ismael Gardner MD      Procedure  Procedures         [1]   Past Medical History:  Diagnosis Date    Acute vaginitis 05/23/2019    Acute vaginitis    Celiac disease ([facility])     Adult celiac disease    Cigarette nicotine dependence without complication 08/15/2023    Encounter for general adult medical examination without abnormal findings 05/23/2019    Routine general medical examination at a health care facility    Encounter for screening for malignant neoplasm of colon 03/15/2021    Screen for colon cancer    Nonscarring hair loss, unspecified 08/09/2019    Hair loss    Personal history of other diseases of the digestive system 03/11/2020    History of rectal bleeding    Personal history of other diseases of the musculoskeletal system and connective tissue     History of rheumatoid arthritis    Personal history of other diseases of the respiratory system 04/08/2020    History of  bronchitis    Personal history of other diseases of the respiratory system 04/22/2020    History of upper respiratory infection    Personal history of other specified conditions 07/15/2019    History of abdominal pain   [2]   Past Surgical History:  Procedure Laterality Date    OTHER SURGICAL HISTORY  03/13/2017    Parathyroid    OTHER SURGICAL HISTORY  07/15/2019    Colonoscopy   [3]   Family History  Problem Relation Name Age of Onset    Heart failure Mother      Hypertension Mother      Spondylolysis Mother      Heart attack Father      Other (pace maker) Father      Heart failure Sister      Obesity Sister      Seizures Sister      Hashimoto's thyroiditis Sister      Lupus Sister      Spondylolysis Brother     [4]   Social History  Tobacco Use    Smoking status: Former     Types: Cigarettes     Start date: 3/22/2019    Smokeless tobacco: Never   Substance Use Topics    Alcohol use: Not Currently    Drug use: Never        Danyell Chicas, NATHAN-CNP  06/24/25 2743

## 2025-06-24 NOTE — TELEPHONE ENCOUNTER
Called patient and advised her that her hemoglobin was quite low, 7.5, checked on 6/19/2025.  She was scheduled for an office appointment this afternoon.  Patient states that she feels very ill and has been feeling very weak and dizzy for the past 3 days.  States that she feels like she is going to pass out.  She denies chest pain or shortness of breath.  She endorses bloody, watery, diarrheal stools and difficulty with urination for several days.  Advised patient to call 911 and go to the emergency room.  She states that she has a friend who might be able to drive her there instead.  Advised patient to follow up in the GI clinic after she is discharged.  Patient stated understanding.     She has a history of ulcerative proctosigmoiditis and has not been seen in the GI clinic since 1/24 for a Virtual Telemedicine visit.  She has been on prednisone and tried Lialda in the past which apparently caused a rash and lip swelling.  She has been advised to begin a biologic such as Entyvio in the past by Dr Cha.

## 2025-06-24 NOTE — ED PROCEDURE NOTE
Procedure  Critical Care    Performed by: Ismael HARDWICK MD  Authorized by: Ismael HARDWICK MD    Critical care provider statement:     Critical care time (minutes):  45    Critical care time was exclusive of:  Separately billable procedures and treating other patients    Critical care was necessary to treat or prevent imminent or life-threatening deterioration of the following conditions: Blood loss anemia requiring packed red blood cells.    Critical care was time spent personally by me on the following activities:  Blood draw for specimens, development of treatment plan with patient or surrogate, discussions with consultants, discussions with primary provider, evaluation of patient's response to treatment, examination of patient, obtaining history from patient or surrogate, ordering and performing treatments and interventions, ordering and review of laboratory studies, ordering and review of radiographic studies, pulse oximetry, re-evaluation of patient's condition and review of old charts    Care discussed with: admitting provider                 Ismael HARDWICK MD  06/24/25 7701

## 2025-06-25 ENCOUNTER — SURGERY (OUTPATIENT)
Age: 70
End: 2025-06-25
Payer: MEDICARE

## 2025-06-25 ENCOUNTER — APPOINTMENT (OUTPATIENT)
Dept: CARDIOLOGY | Facility: HOSPITAL | Age: 70
End: 2025-06-25
Payer: MEDICARE

## 2025-06-25 PROBLEM — I82.220 IVC THROMBOSIS (MULTI): Status: ACTIVE | Noted: 2025-06-24

## 2025-06-25 LAB
ALBUMIN SERPL BCP-MCNC: 2.8 G/DL (ref 3.4–5)
ALP SERPL-CCNC: 76 U/L (ref 33–136)
ALT SERPL W P-5'-P-CCNC: 8 U/L (ref 7–45)
ANION GAP SERPL CALC-SCNC: 11 MMOL/L (ref 10–20)
AST SERPL W P-5'-P-CCNC: 10 U/L (ref 9–39)
ATRIAL RATE: 87 BPM
BASOPHILS # BLD AUTO: 0.03 X10*3/UL (ref 0–0.1)
BASOPHILS NFR BLD AUTO: 0.2 %
BILIRUB SERPL-MCNC: 0.6 MG/DL (ref 0–1.2)
BLOOD EXPIRATION DATE: NORMAL
BLOOD EXPIRATION DATE: NORMAL
BUN SERPL-MCNC: 8 MG/DL (ref 6–23)
C DIF TOX TCDA+TCDB STL QL NAA+PROBE: NOT DETECTED
CALCIUM SERPL-MCNC: 7.9 MG/DL (ref 8.6–10.3)
CHLORIDE SERPL-SCNC: 109 MMOL/L (ref 98–107)
CO2 SERPL-SCNC: 21 MMOL/L (ref 21–32)
CREAT SERPL-MCNC: 0.57 MG/DL (ref 0.5–1.05)
DISPENSE STATUS: NORMAL
DISPENSE STATUS: NORMAL
EGFRCR SERPLBLD CKD-EPI 2021: >90 ML/MIN/1.73M*2
EOSINOPHIL # BLD AUTO: 0 X10*3/UL (ref 0–0.7)
EOSINOPHIL NFR BLD AUTO: 0 %
ERYTHROCYTE [DISTWIDTH] IN BLOOD BY AUTOMATED COUNT: 18.6 % (ref 11.5–14.5)
GLUCOSE SERPL-MCNC: 140 MG/DL (ref 74–99)
HBV SURFACE AB SER-ACNC: 91.1 MIU/ML
HCT VFR BLD AUTO: 31.5 % (ref 36–46)
HGB BLD-MCNC: 9.9 G/DL (ref 12–16)
HOLD SPECIMEN: NORMAL
HOLD SPECIMEN: NORMAL
IMM GRANULOCYTES # BLD AUTO: 0.11 X10*3/UL (ref 0–0.7)
IMM GRANULOCYTES NFR BLD AUTO: 0.8 % (ref 0–0.9)
LYMPHOCYTES # BLD AUTO: 2.55 X10*3/UL (ref 1.2–4.8)
LYMPHOCYTES NFR BLD AUTO: 18 %
MCH RBC QN AUTO: 24.7 PG (ref 26–34)
MCHC RBC AUTO-ENTMCNC: 31.4 G/DL (ref 32–36)
MCV RBC AUTO: 79 FL (ref 80–100)
MONOCYTES # BLD AUTO: 0.3 X10*3/UL (ref 0.1–1)
MONOCYTES NFR BLD AUTO: 2.1 %
NEUTROPHILS # BLD AUTO: 11.19 X10*3/UL (ref 1.2–7.7)
NEUTROPHILS NFR BLD AUTO: 78.9 %
NRBC BLD-RTO: 0.1 /100 WBCS (ref 0–0)
P AXIS: 82 DEGREES
P OFFSET: 183 MS
P ONSET: 129 MS
PLATELET # BLD AUTO: 423 X10*3/UL (ref 150–450)
POTASSIUM SERPL-SCNC: 3.7 MMOL/L (ref 3.5–5.3)
PR INTERVAL: 172 MS
PRODUCT BLOOD TYPE: 1700
PRODUCT BLOOD TYPE: 1700
PRODUCT CODE: NORMAL
PRODUCT CODE: NORMAL
PROT SERPL-MCNC: 6.5 G/DL (ref 6.4–8.2)
Q ONSET: 215 MS
QRS COUNT: 15 BEATS
QRS DURATION: 88 MS
QT INTERVAL: 396 MS
QTC CALCULATION(BAZETT): 476 MS
QTC FREDERICIA: 448 MS
R AXIS: 54 DEGREES
RBC # BLD AUTO: 4.01 X10*6/UL (ref 4–5.2)
SODIUM SERPL-SCNC: 137 MMOL/L (ref 136–145)
T AXIS: 65 DEGREES
T OFFSET: 413 MS
UNIT ABO: NORMAL
UNIT ABO: NORMAL
UNIT NUMBER: NORMAL
UNIT NUMBER: NORMAL
UNIT RH: NORMAL
UNIT RH: NORMAL
UNIT VOLUME: 350
UNIT VOLUME: 350
VARICELLA ZOSTER IGG INDEX: 3.2 AI
VENTRICULAR RATE: 87 BPM
VZV IGG SER QL IA: POSITIVE
WBC # BLD AUTO: 14.2 X10*3/UL (ref 4.4–11.3)
XM INTEP: NORMAL
XM INTEP: NORMAL

## 2025-06-25 PROCEDURE — 2720000007 HC OR 272 NO HCPCS: Performed by: INTERNAL MEDICINE

## 2025-06-25 PROCEDURE — 7100000010 HC PHASE TWO TIME - EACH INCREMENTAL 1 MINUTE: Performed by: INTERNAL MEDICINE

## 2025-06-25 PROCEDURE — 99223 1ST HOSP IP/OBS HIGH 75: CPT | Performed by: NURSE PRACTITIONER

## 2025-06-25 PROCEDURE — C1880 VENA CAVA FILTER: HCPCS | Performed by: INTERNAL MEDICINE

## 2025-06-25 PROCEDURE — 93005 ELECTROCARDIOGRAM TRACING: CPT

## 2025-06-25 PROCEDURE — 87493 C DIFF AMPLIFIED PROBE: CPT | Performed by: INTERNAL MEDICINE

## 2025-06-25 PROCEDURE — 7100000009 HC PHASE TWO TIME - INITIAL BASE CHARGE: Performed by: INTERNAL MEDICINE

## 2025-06-25 PROCEDURE — 99222 1ST HOSP IP/OBS MODERATE 55: CPT | Performed by: NURSE PRACTITIONER

## 2025-06-25 PROCEDURE — 7100000001 HC RECOVERY ROOM TIME - INITIAL BASE CHARGE: Performed by: INTERNAL MEDICINE

## 2025-06-25 PROCEDURE — 7100000002 HC RECOVERY ROOM TIME - EACH INCREMENTAL 1 MINUTE: Performed by: INTERNAL MEDICINE

## 2025-06-25 PROCEDURE — 86787 VARICELLA-ZOSTER ANTIBODY: CPT | Performed by: NURSE PRACTITIONER

## 2025-06-25 PROCEDURE — 36430 TRANSFUSION BLD/BLD COMPNT: CPT

## 2025-06-25 PROCEDURE — 86787 VARICELLA-ZOSTER ANTIBODY: CPT | Mod: ELYLAB | Performed by: NURSE PRACTITIONER

## 2025-06-25 PROCEDURE — 2780000003 HC OR 278 NO HCPCS: Performed by: INTERNAL MEDICINE

## 2025-06-25 PROCEDURE — 36415 COLL VENOUS BLD VENIPUNCTURE: CPT | Performed by: INTERNAL MEDICINE

## 2025-06-25 PROCEDURE — 06H03DZ INSERTION OF INTRALUMINAL DEVICE INTO INFERIOR VENA CAVA, PERCUTANEOUS APPROACH: ICD-10-PCS | Performed by: INTERNAL MEDICINE

## 2025-06-25 PROCEDURE — 81335 TPMT GENE COM VARIANTS: CPT | Performed by: NURSE PRACTITIONER

## 2025-06-25 PROCEDURE — 2500000005 HC RX 250 GENERAL PHARMACY W/O HCPCS: Performed by: INTERNAL MEDICINE

## 2025-06-25 PROCEDURE — 36415 COLL VENOUS BLD VENIPUNCTURE: CPT | Performed by: NURSE PRACTITIONER

## 2025-06-25 PROCEDURE — 86481 TB AG RESPONSE T-CELL SUSP: CPT | Performed by: NURSE PRACTITIONER

## 2025-06-25 PROCEDURE — 2500000004 HC RX 250 GENERAL PHARMACY W/ HCPCS (ALT 636 FOR OP/ED): Mod: JZ | Performed by: INTERNAL MEDICINE

## 2025-06-25 PROCEDURE — 93010 ELECTROCARDIOGRAM REPORT: CPT | Performed by: INTERNAL MEDICINE

## 2025-06-25 PROCEDURE — P9016 RBC LEUKOCYTES REDUCED: HCPCS

## 2025-06-25 PROCEDURE — 37191 INS ENDOVAS VENA CAVA FILTR: CPT | Performed by: INTERNAL MEDICINE

## 2025-06-25 PROCEDURE — 2550000001 HC RX 255 CONTRASTS: Performed by: INTERNAL MEDICINE

## 2025-06-25 PROCEDURE — 99233 SBSQ HOSP IP/OBS HIGH 50: CPT | Performed by: INTERNAL MEDICINE

## 2025-06-25 PROCEDURE — 2500000004 HC RX 250 GENERAL PHARMACY W/ HCPCS (ALT 636 FOR OP/ED): Performed by: INTERNAL MEDICINE

## 2025-06-25 PROCEDURE — C1894 INTRO/SHEATH, NON-LASER: HCPCS | Performed by: INTERNAL MEDICINE

## 2025-06-25 PROCEDURE — 85025 COMPLETE CBC W/AUTO DIFF WBC: CPT | Performed by: INTERNAL MEDICINE

## 2025-06-25 PROCEDURE — 86706 HEP B SURFACE ANTIBODY: CPT | Mod: ELYLAB | Performed by: NURSE PRACTITIONER

## 2025-06-25 PROCEDURE — 80053 COMPREHEN METABOLIC PANEL: CPT | Performed by: INTERNAL MEDICINE

## 2025-06-25 PROCEDURE — 1200000002 HC GENERAL ROOM WITH TELEMETRY DAILY

## 2025-06-25 DEVICE — CELECT PLATINUM VENA CAVA FILTER SET FOR FEMORAL VEIN APPROACH
Type: IMPLANTABLE DEVICE | Site: VENA CAVA | Status: FUNCTIONAL
Brand: COOK CELECT

## 2025-06-25 RX ORDER — LIDOCAINE HYDROCHLORIDE 20 MG/ML
INJECTION, SOLUTION INFILTRATION; PERINEURAL AS NEEDED
Status: DISCONTINUED | OUTPATIENT
Start: 2025-06-25 | End: 2025-06-25 | Stop reason: HOSPADM

## 2025-06-25 RX ORDER — PANTOPRAZOLE SODIUM 40 MG/10ML
40 INJECTION, POWDER, LYOPHILIZED, FOR SOLUTION INTRAVENOUS DAILY
Status: DISCONTINUED | OUTPATIENT
Start: 2025-06-25 | End: 2025-07-02

## 2025-06-25 RX ORDER — MIDAZOLAM HYDROCHLORIDE 1 MG/ML
INJECTION, SOLUTION INTRAMUSCULAR; INTRAVENOUS AS NEEDED
Status: DISCONTINUED | OUTPATIENT
Start: 2025-06-25 | End: 2025-06-25 | Stop reason: HOSPADM

## 2025-06-25 RX ORDER — FENTANYL CITRATE 50 UG/ML
INJECTION, SOLUTION INTRAMUSCULAR; INTRAVENOUS AS NEEDED
Status: DISCONTINUED | OUTPATIENT
Start: 2025-06-25 | End: 2025-06-25 | Stop reason: HOSPADM

## 2025-06-25 RX ADMIN — METHYLPREDNISOLONE SODIUM SUCCINATE 40 MG: 40 INJECTION, POWDER, FOR SOLUTION INTRAMUSCULAR; INTRAVENOUS at 11:23

## 2025-06-25 RX ADMIN — METHYLPREDNISOLONE SODIUM SUCCINATE 40 MG: 40 INJECTION, POWDER, FOR SOLUTION INTRAMUSCULAR; INTRAVENOUS at 20:46

## 2025-06-25 RX ADMIN — LIDOCAINE HYDROCHLORIDE 20 ML: 20 INJECTION, SOLUTION INFILTRATION; PERINEURAL at 15:19

## 2025-06-25 RX ADMIN — PANTOPRAZOLE SODIUM 40 MG: 40 INJECTION, POWDER, LYOPHILIZED, FOR SOLUTION INTRAVENOUS at 11:23

## 2025-06-25 RX ADMIN — FENTANYL CITRATE 50 MCG: 50 INJECTION, SOLUTION INTRAMUSCULAR; INTRAVENOUS at 15:18

## 2025-06-25 RX ADMIN — Medication 3 L/MIN: at 15:07

## 2025-06-25 RX ADMIN — MIDAZOLAM 2 MG: 1 INJECTION INTRAMUSCULAR; INTRAVENOUS at 15:18

## 2025-06-25 RX ADMIN — IOHEXOL 48 ML: 350 INJECTION, SOLUTION INTRAVENOUS at 15:26

## 2025-06-25 ASSESSMENT — COGNITIVE AND FUNCTIONAL STATUS - GENERAL
DAILY ACTIVITIY SCORE: 19
TURNING FROM BACK TO SIDE WHILE IN FLAT BAD: A LITTLE
MOVING FROM LYING ON BACK TO SITTING ON SIDE OF FLAT BED WITH BEDRAILS: A LITTLE
PERSONAL GROOMING: A LITTLE
HELP NEEDED FOR BATHING: A LITTLE
DRESSING REGULAR UPPER BODY CLOTHING: A LITTLE
MOBILITY SCORE: 17
MOVING TO AND FROM BED TO CHAIR: A LITTLE
MOVING TO AND FROM BED TO CHAIR: A LITTLE
STANDING UP FROM CHAIR USING ARMS: A LITTLE
STANDING UP FROM CHAIR USING ARMS: A LITTLE
PERSONAL GROOMING: A LITTLE
TURNING FROM BACK TO SIDE WHILE IN FLAT BAD: A LITTLE
WALKING IN HOSPITAL ROOM: A LITTLE
WALKING IN HOSPITAL ROOM: A LITTLE
MOBILITY SCORE: 17
TOILETING: A LITTLE
CLIMB 3 TO 5 STEPS WITH RAILING: A LOT
DAILY ACTIVITIY SCORE: 19
HELP NEEDED FOR BATHING: A LITTLE
MOVING FROM LYING ON BACK TO SITTING ON SIDE OF FLAT BED WITH BEDRAILS: A LITTLE
DRESSING REGULAR LOWER BODY CLOTHING: A LITTLE
DRESSING REGULAR LOWER BODY CLOTHING: A LITTLE
DRESSING REGULAR UPPER BODY CLOTHING: A LITTLE
CLIMB 3 TO 5 STEPS WITH RAILING: A LOT
TOILETING: A LITTLE

## 2025-06-25 ASSESSMENT — PAIN SCALES - GENERAL
PAINLEVEL_OUTOF10: 0 - NO PAIN

## 2025-06-25 ASSESSMENT — ENCOUNTER SYMPTOMS
CARDIOVASCULAR NEGATIVE: 1
NAUSEA: 1
NEUROLOGICAL NEGATIVE: 1
ABDOMINAL PAIN: 1
ACTIVITY CHANGE: 1
MUSCULOSKELETAL NEGATIVE: 1
RESPIRATORY NEGATIVE: 1
FATIGUE: 1
DIARRHEA: 1
BLOOD IN STOOL: 1

## 2025-06-25 NOTE — CONSULTS
Reason For Consult  UC/acute blood loss anemia      This note was created using voice recognition transcription software. Despite proofreading, unintentional typographical errors may be present. Please contact the GI office with any questions or concerns.       This is a 68 yo Female with a PMH of UC (rectosigmoiditis), RA, diverticulitis (2018), ?celiac (was told yes then no), HLD, HTN,  hypothyroidism s/p thyroidectomy who presented to [facility] on 6/24/25 by direction of Marianne Arora duet to bloody stools and low Hgb.  GI was consulted for  UC/acute blood loss anemia.  Currently has an acute non occlusive DVT involving the right extrarenal iliac vein, right common iliac vein, infrarenal IVC.  She is an established patient of Dr. Cha (since July 2019-last saw in 2020) and last saw Marianne Arora, MAGALYS 6/24/25.  Her Hgb was 7.5 and was directed to go to the ED due to dizziness and bloody/watery stools.  It appears she goes to a variety of GI providers including Dr. Santa.  She has been on Enbrel for RA (long time ago) and was unable to tolerate it after almost 1 year.  She agreed to start Entyvio but then later declined due to side effects.  She could not tolerate Lialda due to rash/lip swelling.  She has been on Asacol, Apriso, and steroids per her.  I have also seen documented in other notes that she has been on multiple biologics but she denies this.      Subjective  Extremely anxious and she is not able to answer many questions.  Generalized abdominal pain that started last night after having numerous (unable to count how many) bloody/mucus bowel movements.  She states she rarely has a flare.  She thinks she normally has about 1 BM a day when she is well controlled.  She tries to control her UC with Juan [employer] Chi and yoga.    She has been hesitant to start any biologics in the past because she was on Enbrel for almost a year (many years ago for her RA) and felt like it weakened her immune system and it took 2-2.5 years  to recover from that.  She feels desperate right now and would like to reconsider this.       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  4/25/18 [facility] unremarkable. 2011,  3/6/09,  BM-Daily.  Normal consistency.  No bleeding or weight loss.  FHX-  SHX-No tobacco/illicits/ETOH  Ab Sx-Tubal ligation  NSAIDs-         Allergies: as mentioned in H&P      A 10 point review of system is negative except for what is mentioned in the HPI    Vital Signs: Reviewed    Physical Exam:  General: Polite, calm, resting  Skin:  Warm and dry, no jaundice  HEENT: No scleral icterus, no conjunctival pallor, normocephalic, atraumatic, mucous membranes moist  Neck:  atraumatic, trachea midline, no JVD  Chest:  Clear to auscultation bilaterally. No wheezes, rales, or rhonchi  CV:  Regular rate and rhythm.  Positive S1/S2  Abdomen: no distension, +BS, soft, non-tender to palpation, no rebound tenderness, no guarding, no rigidity, no discernible ascites   Extremities: no lower extremity edema, chronic pigmentary changes, no cyanosis  Neurological:  A&Ox3, no asterixis  Psychiatric: cooperative     Investigations:  Labs, radiological imaging and cardiac work up were reviewed      Objective:         6/24/2025     7:45 PM 6/25/2025    12:23 AM 6/25/2025    12:25 AM 6/25/2025    12:40 AM 6/25/2025     1:25 AM 6/25/2025     3:30 AM 6/25/2025     4:00 AM   Vitals   Systolic 155 143 147 141 132 129 126   Diastolic 71 67 66 70 63 65 66   Heart Rate 98 97 95 93 82 80 77   Temp 36.8 °C (98.3 °F) 36.9 °C (98.4 °F) 36.9 °C (98.4 °F) 36.7 °C (98.1 °F) 36.6 °C (97.8 °F) 36.4 °C (97.5 °F) 36.5 °C (97.7 °F)   Resp 18 16 16 16 16 16 16   Height 1.524 m (5')         Weight (lb) 134.5         BMI 26.27 kg/m2         BSA (m2) 1.61 m2         Visit Report Report                Medications:  Scheduled Medications[1]     Recent Results (from the past 72 hours)   CBC and Auto Differential     Collection Time: 06/24/25  1:44 PM   Result Value Ref Range    WBC 12.2 (H) 4.4 - 11.3 x10*3/uL    nRBC 0.6 (H) 0.0 - 0.0 /100 WBCs    RBC 2.93 (L) 4.00 - 5.20 x10*6/uL    Hemoglobin 6.5 (LL) 12.0 - 16.0 g/dL    Hematocrit 21.8 (L) 36.0 - 46.0 %    MCV 74 (L) 80 - 100 fL    MCH 22.2 (L) 26.0 - 34.0 pg    MCHC 29.8 (L) 32.0 - 36.0 g/dL    RDW 17.4 (H) 11.5 - 14.5 %    Platelets 438 150 - 450 x10*3/uL    Neutrophils % 66.2 40.0 - 80.0 %    Immature Granulocytes %, Automated 0.9 0.0 - 0.9 %    Lymphocytes % 24.3 13.0 - 44.0 %    Monocytes % 7.9 2.0 - 10.0 %    Eosinophils % 0.5 0.0 - 6.0 %    Basophils % 0.2 0.0 - 2.0 %    Neutrophils Absolute 8.06 (H) 1.20 - 7.70 x10*3/uL    Immature Granulocytes Absolute, Automated 0.11 0.00 - 0.70 x10*3/uL    Lymphocytes Absolute 2.96 1.20 - 4.80 x10*3/uL    Monocytes Absolute 0.96 0.10 - 1.00 x10*3/uL    Eosinophils Absolute 0.06 0.00 - 0.70 x10*3/uL    Basophils Absolute 0.03 0.00 - 0.10 x10*3/uL   Basic metabolic panel    Collection Time: 06/24/25  1:44 PM   Result Value Ref Range    Glucose 105 (H) 74 - 99 mg/dL    Sodium 135 (L) 136 - 145 mmol/L    Potassium 2.9 (LL) 3.5 - 5.3 mmol/L    Chloride 102 98 - 107 mmol/L    Bicarbonate 24 21 - 32 mmol/L    Anion Gap 12 10 - 20 mmol/L    Urea Nitrogen 11 6 - 23 mg/dL    Creatinine 0.77 0.50 - 1.05 mg/dL    eGFR 83 >60 mL/min/1.73m*2    Calcium 7.9 (L) 8.6 - 10.3 mg/dL   Lipase    Collection Time: 06/24/25  1:44 PM   Result Value Ref Range    Lipase 24 9 - 82 U/L   Hepatic function panel    Collection Time: 06/24/25  1:44 PM   Result Value Ref Range    Albumin 2.8 (L) 3.4 - 5.0 g/dL    Bilirubin, Total 0.4 0.0 - 1.2 mg/dL    Bilirubin, Direct 0.1 0.0 - 0.3 mg/dL    Alkaline Phosphatase 75 33 - 136 U/L    ALT 8 7 - 45 U/L    AST 10 9 - 39 U/L    Total Protein 6.5 6.4 - 8.2 g/dL   Lactate    Collection Time: 06/24/25  1:44 PM   Result Value Ref Range    Lactate 1.9 0.4 - 2.0 mmol/L   Protime-INR    Collection Time: 06/24/25  1:44 PM    Result Value Ref Range    Protime 14.7 (H) 9.8 - 12.4 seconds    INR 1.3 (H) 0.9 - 1.1   Troponin I, High Sensitivity    Collection Time: 06/24/25  1:44 PM   Result Value Ref Range    Troponin I, High Sensitivity 6 0 - 13 ng/L   Morphology    Collection Time: 06/24/25  1:44 PM   Result Value Ref Range    RBC Morphology See Below     Polychromasia Mild    Sedimentation rate, automated    Collection Time: 06/24/25  1:44 PM   Result Value Ref Range    Sedimentation Rate 31 (H) 0 - 30 mm/h   C-reactive protein    Collection Time: 06/24/25  1:44 PM   Result Value Ref Range    C-Reactive Protein 9.14 (H) <1.00 mg/dL   ECG 12 lead    Collection Time: 06/24/25  2:17 PM   Result Value Ref Range    Ventricular Rate 93 BPM    Atrial Rate 93 BPM    OK Interval 164 ms    QRS Duration 92 ms    QT Interval 396 ms    QTC Calculation(Bazett) 492 ms    P Axis 71 degrees    R Axis 12 degrees    T Axis 47 degrees    QRS Count 15 beats    Q Onset 209 ms    P Onset 127 ms    P Offset 178 ms    T Offset 407 ms    QTC Fredericia 458 ms   Prepare RBC: 2 Units    Collection Time: 06/24/25  3:52 PM   Result Value Ref Range    PRODUCT CODE V3545H11     Unit Number [id number]     Unit ABO B     Unit RH NEG     XM INTEP COMP     Dispense Status TR     Blood Expiration Date 7/24/2025 11:59:00 PM EDT     PRODUCT BLOOD TYPE 1700     UNIT VOLUME 350     PRODUCT CODE X0603V96     Unit Number [id number]     Unit ABO B     Unit RH NEG     XM INTEP COMP     Dispense Status TR     Blood Expiration Date 7/10/2025 11:59:00 PM EDT     PRODUCT BLOOD TYPE 1700     UNIT VOLUME 350    Type and screen    Collection Time: 06/24/25  3:55 PM   Result Value Ref Range    ABO TYPE B     Rh TYPE POS     ANTIBODY SCREEN NEG    VERIFY ABO/Rh Group Test    Collection Time: 06/24/25  4:28 PM   Result Value Ref Range    ABO TYPE B     Rh TYPE POS    Urinalysis with Reflex Culture and Microscopic    Collection Time: 06/24/25  5:15 PM   Result Value Ref Range     Color, Urine Colorless (N) Light-Yellow, Yellow, Dark-Yellow    Appearance, Urine Clear Clear    Specific Gravity, Urine 1.026 1.005 - 1.035    pH, Urine 7.0 5.0, 5.5, 6.0, 6.5, 7.0, 7.5, 8.0    Protein, Urine NEGATIVE NEGATIVE, 10 (TRACE), 20 (TRACE) mg/dL    Glucose, Urine Normal Normal mg/dL    Blood, Urine NEGATIVE NEGATIVE mg/dL    Ketones, Urine 10 (1+) (A) NEGATIVE mg/dL    Bilirubin, Urine NEGATIVE NEGATIVE mg/dL    Urobilinogen, Urine Normal Normal mg/dL    Nitrite, Urine NEGATIVE NEGATIVE    Leukocyte Esterase, Urine NEGATIVE NEGATIVE   CBC and Auto Differential    Collection Time: 06/25/25  5:31 AM   Result Value Ref Range    WBC 14.2 (H) 4.4 - 11.3 x10*3/uL    nRBC 0.1 (H) 0.0 - 0.0 /100 WBCs    RBC 4.01 4.00 - 5.20 x10*6/uL    Hemoglobin 9.9 (L) 12.0 - 16.0 g/dL    Hematocrit 31.5 (L) 36.0 - 46.0 %    MCV 79 (L) 80 - 100 fL    MCH 24.7 (L) 26.0 - 34.0 pg    MCHC 31.4 (L) 32.0 - 36.0 g/dL    RDW 18.6 (H) 11.5 - 14.5 %    Platelets 423 150 - 450 x10*3/uL    Neutrophils % 78.9 40.0 - 80.0 %    Immature Granulocytes %, Automated 0.8 0.0 - 0.9 %    Lymphocytes % 18.0 13.0 - 44.0 %    Monocytes % 2.1 2.0 - 10.0 %    Eosinophils % 0.0 0.0 - 6.0 %    Basophils % 0.2 0.0 - 2.0 %    Neutrophils Absolute 11.19 (H) 1.20 - 7.70 x10*3/uL    Immature Granulocytes Absolute, Automated 0.11 0.00 - 0.70 x10*3/uL    Lymphocytes Absolute 2.55 1.20 - 4.80 x10*3/uL    Monocytes Absolute 0.30 0.10 - 1.00 x10*3/uL    Eosinophils Absolute 0.00 0.00 - 0.70 x10*3/uL    Basophils Absolute 0.03 0.00 - 0.10 x10*3/uL          Assessment:  This is a 70 yo Female with a PMH of UC (rectosigmoiditis), RA, diverticulitis (2018), ?celiac (was told yes then no), HLD, HTN,  hypothyroidism s/p thyroidectomy who presented to [facility] on 6/24/25 by direction of Marianne sánchez to bloody stools and low Hgb.  GI was consulted for  UC/acute blood loss anemia.  Currently has an acute non occlusive DVT involving the right extrarenal iliac vein,  right common iliac vein, infrarenal IVC.  She is an established patient of Dr. Cha (since July 2019-last saw in 2020) and last saw Marianne Arora, CNP 6/24/25.  Her Hgb was 7.5 and was directed to go to the ED due to dizziness and bloody/watery stools.  It appears she goes to a variety of GI providers including Dr. Santa.  She has been on Enbrel for RA (long time ago) and was unable to tolerate it after almost 1 year.  She agreed to start Entyvio but then later declined due to side effects.  She could not tolerate Lialda due to rash/lip swelling.  She has been on Asacol, Apriso, and steroids per her.  I have also seen documented in other notes that she has been on multiple biologics but she denies this.  Recent bloody stools and symptomatic of anemia.  History of noncompliance as to recommended biologic regimen in the past and states she will be compliant now and would like to start one.  Discussed case with her GI doctor, Dr. Cha and he would like for her to have a flexible sigmoidoscopy and a colonoscopy at a later time.    Currently has an acute DVT involving iliacs/infrarenal IVC and vascular surgery was consulted for possible thrombectomy as she cannot tolerate AC.    -C diff  Hgb 6.5 upon admission and now 9.9.  WBC 14.2  MCV 79  CRP 9.14  Vit D 19  Vit B 12 585  +TIAN    CT A/P showed Nonspecific mild colitis extending from the hepatic flexure down  through the rectum. Differential considerations include inflammatory  bowel disease (ulcer colitis), infectious colitis, and ischemic  colitis. Suggest direct visualization with colonoscopy, and tiny fat containing umbilical hernia.        Plan  1.)   UC/acute blood loss anemia-Fecal calprotectin, T spot, fecal calprotectin, TPMT, varicella, HBV in anticipation of starting biologic.  Continue steroids for now.  Dr. Cha's MA will start a PA for Entyvio.  Iron replacement per primary team.  NPO after midnight for flex sigmoidoscopy.        Discussed above patient  assessment and plan with Dr. Chandra Cuevas and Dr. Cuevas.    I spent 80 minutes in the professional and overall care of this patient.      06/25/25 at 6:38 AM - NATHAN Lin-CNP          [1] enoxaparin, 40 mg, subcutaneous, q24h  methylPREDNISolone sodium succinate (PF), 40 mg, intravenous, q12h

## 2025-06-25 NOTE — PROGRESS NOTES
06/25/25 1310   Discharge Planning   Living Arrangements Alone   Support Systems Family members   Type of Residence Private residence   Who is requesting discharge planning? Provider   Expected Discharge Disposition   (TBD/PT/OT eval pending)   Patient Choice   Provider Choice list and CMS website (https://medicare.gov/care-compare#search) for post-acute Quality and Resource Measure Data were provided and reviewed with: Patient   Intensity of Service   Intensity of Service 0-30 min     Met with pt & explained my role. Pt from home & reports was independent with ambulation & driving until last week. No DME. 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 Plan for IVC filter today. PT/OT eval pending. Pt open to inpt rehab if needed/recommended but would like to discuss after procedures completed & therapy eval. Pt in agreement with care transitions team to follow & assist with dc planning as dc needs are identified.

## 2025-06-25 NOTE — NURSING NOTE
Returned from procedure, site is clean and dry no signs of swelling or bleeding covered with dressing intact.

## 2025-06-25 NOTE — H&P (VIEW-ONLY)
Inpatient consult to Vascular Surgery  Consult performed by: NATHAN Fernandez-CNP  Consult ordered by: Juan Daniel Hinojosa MD  Reason for consult: Acute DVT involving infrarenal IVC and R iliacs; unable to tolerate OAC due to acute blood loss anemia          Reason For Consult  Acute DVT involving infrarenal IVC and R iliacs; unable to tolerate OAC due to acute blood loss anemia    History Of Present Illness  Leola Jennings is a 69 y.o. female with PMHx of HTN, HLD, ulcerative colitis, and remote smoking history who presented to [facility] ED c/o abdominal pain associated with bloody and mucousy stools. Afebrile and hemodynamically stable on arrival. Labs notable for WBC 12.2, Hgb 6.5, GFR 83, K+ 2.9, INR 1.3, CRP 9.14, and Lactate 1.9. CT a/p with contrast revealed incidental finding of nonocclusive thrombus to infrarenal IVC and right external and common iliacs; additional findings as noted below. PT was given 1L NS, medicated for pain/nausea, and potassium replacement. 2 units PRBC's given. Pt was admitted to telemetry with GI consult for further management of colitis and GI bleed. PT was given a 1x prophylactic dose of Lovenox. Vascular surgery has been consulted for consideration of IVC filter.     Pt seen in bed. She is afebrile and HDS. Slight bump in WBC's to 14.2 today but patient was given dose of IV steroid last night. Hgb improved to 9.9 following 2 units PRBC's. Pt reports hx of LLE DVT ~30 years ago. She was treated with OAC. She is a former smoker but quit in 2019. She has a very active lifestyle although admits is has been somewhat limited recently due to her GI issues. Reportedly, they wanted her to be on life long OAC but she did not want that and stopped after symptoms resolved. She has had no recurrent clotting events and has not been on anticoagulation since that time. She reports diarrhea has slowed since admission but she did have recurrent bloody stool following Lovenox administration. She  denies lower extremity swelling. On exam, she has trace bilateral LE edema but all compartments are soft. Feet are well perfused with easily palpable pulses. Findings reviewed with Dr. Humphreys who agrees with IVC filter placement. Unfortunately he is at [facility] today. Discussed with Dr. Valles who is able to do procedure today. Education regarding procedure discussed at length with patient who is agreeable to proceed. Case request submitted. Please continue NPO; post procedure can resume oral intake from vascular perspective but will defer diet advancement to GI. Thank you for this consult.      Past Medical History  She has a past medical history of Acute vaginitis (05/23/2019), C[facility]ac disease [facility] Cigarette nicotine dependence without complication (08/15/2023), Encounter for general adult medical examination without abnormal findings (05/23/2019), Encounter for screening for malignant neoplasm of colon (03/15/2021), Nonscarring hair loss, unspecified (08/09/2019), Personal history of other diseases of the digestive system (03/11/2020), Personal history of other diseases of the musculoskeletal system and connective tissue, Personal history of other diseases of the respiratory system (04/08/2020), Personal history of other diseases of the respiratory system (04/22/2020), and Personal history of other specified conditions (07/15/2019).    Surgical History  She has a past surgical history that includes Other surgical history (03/13/2017) and Other surgical history (07/15/2019).     Social History  She reports that she has quit smoking. Her smoking use included cigarettes. She started smoking about 6 years ago. She has never used smokeless tobacco. She reports that she does not currently use alcohol. She reports that she does not use drugs.    Family History  Family History[1]     Allergies  Patient has no known allergies.    Review of Systems   Constitutional:  Positive for activity change and fatigue.    Respiratory: Negative.     Cardiovascular: Negative.  Negative for leg swelling.   Gastrointestinal:  Positive for abdominal pain, blood in stool, diarrhea and nausea.   Musculoskeletal: Negative.    Skin: Negative.    Neurological: Negative.    All other systems reviewed and are negative.       Physical Exam  Vitals and nursing note reviewed.   Constitutional:       General: She is not in acute distress.     Appearance: Normal appearance. She is normal weight.   HENT:      Head: Normocephalic and atraumatic.      Right Ear: External ear normal.      Left Ear: External ear normal.      Nose: Nose normal.      Mouth/Throat:      Mouth: Mucous membranes are moist.      Pharynx: Oropharynx is clear.   Eyes:      Extraocular Movements: Extraocular movements intact.      Pupils: Pupils are equal, round, and reactive to light.   Cardiovascular:      Rate and Rhythm: Normal rate and regular rhythm.      Pulses: Normal pulses.   Abdominal:      General: Abdomen is flat. Bowel sounds are normal.      Palpations: Abdomen is soft.      Tenderness: There is abdominal tenderness. There is no guarding.   Musculoskeletal:      Cervical back: Normal range of motion.      Right lower leg: No edema.      Left lower leg: No edema.   Skin:     General: Skin is warm and dry.   Neurological:      General: No focal deficit present.      Mental Status: She is alert and oriented to person, place, and time.   Psychiatric:         Mood and Affect: Mood normal.         Behavior: Behavior normal.          Last Recorded Vitals  Blood pressure 118/63, pulse 78, temperature 36.2 °C (97.2 °F), temperature source Temporal, resp. rate 16, height 1.524 m (5'), weight 61 kg (134 lb 8 oz), SpO2 96%.    Relevant Results  Scheduled medications  Scheduled Medications[2]  Continuous medications  Continuous Medications[3]  PRN medications  PRN Medications[4]      Vascular US lower extremity venous duplex bilateral  Result Date: 6/24/2025  STUDY: Bilateral  Lower Extremity Venous Doppler Ultrasound; 6/24/2025 5:47 PM INDICATION: Thrombus seen in IVC and right iliac vein on CT. COMPARISON: None available. ACCESSION NUMBER(S): [id number] ORDERING CLINICIAN: PARDEEP GUTIERREZ TECHNIQUE:  Real-time grayscale imaging, color Doppler flow imaging, and spectral Doppler imaging of the bilateral lower extremity veins was performed. FINDINGS: There is chronic occlusive DVT demonstrated within the bilateral femoral and left peroneal veins.  There is chronic nonocclusive DVT within the bilateral popliteal veins. The bilateral common femoral and profunda femoral veins demonstrated normal compressibility, normal phasic venous flow and normal response to augmentation.  There is no evidence for echogenic thrombi.      Evidence for chronic occlusive DVT within the bilateral femoral and left peroneal veins and chronic nonocclusive DVT within the bilateral popliteal veins. Signed by Ken Hampton MD    CT abdomen pelvis w IV contrast  Result Date: 6/24/2025  Interpreted By:  Emeterio Oh, STUDY: CT ABDOMEN PELVIS W IV CONTRAST;  6/24/2025 2:53 pm   INDICATION: 70 y/o   F with  Signs/Symptoms:ABD pain, bloody diarrhea.     LIMITATIONS: None.   ACCESSION NUMBER(S): [id number]   ORDERING CLINICIAN: PARDEEP GUTIERREZ   TECHNIQUE: After the administration of     IV nonionic contrast, spiral axial images were obtained from the xiphoid down through the symphysis pubis. Sagittal and coronal reconstruction images were generated. Bone, mediastinal, lung, and liver windows were reviewed. Omnipaque 350   75 ML   COMPARISON: Most recent prior is an unenhanced exam from 06/20/2025..   FINDINGS: LUNG BASES: No mass or pneumonia or pleural effusion in either lung base.. Mild stable bronchiectasis in the posteromedial lung bases, right greater than left.   LIVER: No hepatomegaly. Liver density was  within the limits of normal. No  liver lesion evident in this  exam.   GALLBLADDER: No calcified stone,  gallbladder wall thickening, or adjacent edema.   BILE DUCTS: No intrahepatic biliary ductal dilatation.  Common bile duct was within the limits of normal.   SPLEEN: No splenomegaly. No splenic mass..   PANCREAS: No pancreatic mass or inflammation, or ductal dilatation.   KIDNEYS/ADRENALS: No adrenal mass or enlargement. No calcified stone, hydronephrosis, mass, or perinephric edema in either kidney. No ureteral stone or dilatation.   BLADDER/PELVIS: Urinary bladder was grossly intact. No uterine enlargement or gross adnexal mass.   GREAT VESSELS/RETROPERITONEUM: There is central lucent filling defect consistent with venous thrombosis that extends from the mid to distal right external iliac vein on image 102 cephalad through the right common iliac vein and then cephalad in the inferior vena cava, ending approximately 2.2 cm below the entrance of the right renal vein into the inferior vena cava. There is no similar filling defect on the left. There are mural calcifications in the abdominal aorta down through the iliac arteries. No abdominal aortic aneurysm or gross dissection. Very mild nonspecific and grossly stable right and left periaortic retroperitoneal adenopathy measuring 10 mm short axis or less. No suspicious mesenteric adenopathy. No suspicious pelvic or inguinal adenopathy.   PERITONEUM: No ascites. No pneumoperitoneum. No peritoneal or mesenteric mass or inflammation.   BOWEL: The stomach was  grossly intact. There was no small bowel dilatation or small bowel wall thickening. No small-bowel obstruction. There is mild hypodense colonic wall thickening beginning in the region of the hepatic flexure and extending through the transverse colon, down the left colon, and through the rectum. There is also mild prominence of the mesenteric vessels  adjacent to the thickened large bowel loops. There is subtle mesenteric edema adjacent to portions of the left colon and rectum. There is no extraluminal gas. No bowel  obstruction. No air-fluid collection. The cecal appendix was intact.   BONES: No destructive lytic or blastic bone lesion. Stable grade 1 retrolisthesis of L4 over L5. Mild disc space narrowing with endplate spurring at L2-3 and L4-5. Interfacet hypertrophic arthritic changes at L4-5 and L5-S1. Mild sclerotic arthritic changes in both SI joints.   ABDOMINAL WALL: Tiny fat containing umbilical hernia..       Acute nonocclusive deep venous thrombosis involving the right external iliac vein, right common iliac vein, and the infrarenal IVC. Please see above for details.   Nonspecific mild colitis extending from the hepatic flexure down through the rectum. Differential considerations include inflammatory bowel disease (ulcer colitis), infectious colitis, and ischemic colitis. Suggest direct visualization with colonoscopy. Currently without CT evidence of bowel obstruction or perforation.   Arthritic changes in the lumbosacral spine as described.   Grossly stable mild bronchiectasis at the posteromedial lung bases, right greater than left.   MACRO: Emeterio Oh discussed the significance and urgency of this critical finding by EPIC secure chat with  PARDEEP GUTIERREZ on 6/24/2025 at 3:14 pm.  (**-RCF-**) Findings:  See findings.   Signed by: Emeterio Oh 6/24/2025 3:14 PM Dictation workstation:   [id number]    ECG 12 lead  Result Date: 6/24/2025  Normal sinus rhythm Possible Left atrial enlargement Nonspecific T wave abnormality Prolonged QT Abnormal ECG When compared with ECG of 12-APR-2018 12:47, Questionable change in QRS axis Nonspecific T wave abnormality now evident in Anterior leads           Assessment/Plan     Deep vein thrombosis  Incidental finding of non-occlusive thrombus to infrarenal IVC and R external and common iliac veins. Venous duplex revealed chronic appearing occlusive thrombus to bilateral femoral veins. Unable to initiate OAC due to GI bleeding.   -Discussed with Dr. Humphreys who agrees with filter  placement but [place] [facility] [place] Discussed with Dr. Valles who is able to do procedure this afternoon.   -IVC placement this afternoon with Dr. Valles; case request submitted  -please continue NPO. Post procedure, pt may resume oral intake from vascular perspective but will defer to GI given acute abdominal issues.   -thank you for this consult    GI bleeding; Ulcerative Colits  Hx of ulcerative colitis, previously on biologics but of recent has been managed with steroid tapers.   Hgb 6.5 on presentation. FOBT +. Had recurrent episode of bloody stool following Lovenox administration (prophylactic dose).   6/25: Hgb corrected to 9.9 following 2 units PRBC's.   -close monitoring for bleeding  -follow H&H and transfuse for Hgb<7  -GI consult pending        I spent 60 minutes in the professional and overall care of this patient.               [1]   Family History  Problem Relation Name Age of Onset    Heart failure Mother      Hypertension Mother      Spondylolysis Mother      Heart attack Father      Other (pace maker) Father      Heart failure Sister      Obesity Sister      Seizures Sister      Hashimoto's thyroiditis Sister      Lupus Sister      Spondylolysis Brother     [2] enoxaparin, 40 mg, subcutaneous, q24h  methylPREDNISolone sodium succinate (PF), 40 mg, intravenous, q12h  pantoprazole, 40 mg, intravenous, Daily  [3] sodium chloride 0.9%, 100 mL/hr, Last Rate: 100 mL/hr (06/24/25 2331)  sodium chloride 0.9%, 100 mL/hr, Last Rate: 100 mL/hr (06/24/25 2331)  [4] PRN medications: HYDROmorphone, ondansetron ODT **OR** ondansetron

## 2025-06-25 NOTE — CARE PLAN
The patient's goals for the shift include      The clinical goals for the shift include Pt will have no evidence of active bleed for this hospital stay    Over the shift, the patient did not make progress toward the following goals. Barriers to progression include . Recommendations to address these barriers include .

## 2025-06-25 NOTE — POST-PROCEDURE NOTE
Physician Transition of Care Summary  Invasive Cardiovascular Lab    Procedure Date: 6/25/2025  Attending:    * Sidra Valles - Primary  Resident/Fellow/Other Assistant: Surgeons and Role:  * No surgeons found with a matching role *    Indications:   Pre-op Diagnosis      * Blood loss anemia [D50.0]     * IVC thrombosis (Multi) [I82.220]    Post-procedure diagnosis:   Post-op Diagnosis     * Blood loss anemia [D50.0]     * IVC thrombosis (Multi) [I82.220]    Procedure(s):   IVC Filter Insertion  34500 - WA INS INTRVAS VC FILTR W/WO VAS ACS VSL SELXN RS&I        Procedure Findings:   IVC filter placement    Description of the Procedure:   IVC filter placement    Complications:   None    Stents/Implants:   Implants          Implant          Filter, Vena Cava, Celect, Femoral W/Navalign, 7fr 79mm - [id number] - Implanted        Inventory item: FILTER, VENA CAVA, CELECT, FEMORAL W/NAVALIGN, 7FR 79MM Model/Cat number: [id number]    : [employer] Lot number: [id number]    Device identifier: [id number]        GUDID Information       Request status Successful        Brand name: Soysuper Celect Version/Model: [id number]    Company name: [employer] MRI safety info as of 6/25/25: MR Conditional    Contains dry or latex rubber: No      GMDN P.T. name: Vena cava filter, temporary/permanent                As of 6/25/2025       Status: Implanted                              Anticoagulation/Antiplatelet Plan:   None    Estimated Blood Loss:   5 mL    Anesthesia: Moderate Sedation Anesthesia Staff: No anesthesia staff entered.    Any Specimen(s) Removed:   No specimens collected during this procedure.    Disposition:   Back to the floor in stable condition      Electronically signed by: Sidra Valles MD, 6/25/2025 3:29 PM

## 2025-06-25 NOTE — CARE PLAN
The patient's goals for the shift include      The clinical goals for the shift include Pt will have no evidence of active bleed for this hospital stay

## 2025-06-25 NOTE — PROGRESS NOTES
Medical Group Progress Note  ASSESSMENT & PLAN:     Acute blood loss anemia, symptomatic  GI bleed  Ulcerative colitis  -having increased frequency of loose, bloody stool and associated abdominal pain  -hgb down to 6.5; baseline ~9-10  -transfusing 2uPRBC in ED  -CT abd/pel showing nonspecific mild colitis from the hepatic flexure down through the rectum probably consistent with her known UC  -has mostly been on steroids previously, has refused biologics  -looks like most recently she was on mesalamine with GI at [facility]  -start steroids  -GI consult  -clear liquid diet for now  -check ESR, CRP, calprotectin     Acute non-occlusive DVT  -involving the right extrarenal iliac vein, right common iliac vein, infrarenal IVC  -likely related to her IBD  -don't think we can fully anticoagulate at this point given her significant symptomatic anemia and GI bleeding  -will get formal bilateral duplex  -I will give prophylactic dose lovenox for now and follow her stool  -will have vascular see; would see if IVC filter can be placed but not sure if possible with IVC involvement; ? Whether thrombectomy is a possibility     Hypokalemia  -replete     HTN  HLD  -hold antihypertensives for now              VTE Prophylaxis: held for now    6/25/25:  Appropriate response to 2uPRBCs yesterday with hgb up to 9.9 today  Still feeling quite weak and debilitated however  D/w vascular surgery team - plan for IVC filter today  Ideally we will get her UC under control and eventually can start DOAC and have IVC filter removed  Cont with IV steroids for now  D/w GI - plan flex sig tomorrow; plan for outpatient initiation of biologic; long d/w patient about this and given recent events she is open to starting now  C. Diff negative  Follow H/H  Still having bloody stools, will take some time for steroids to take effect      Juan Daniel Hinojosa MD    SUBJECTIVE     No overnight events. Some bloody loose stools this Am. Still with intermittent  abdominal pain. No fever. No chest pain or dyspnea. No significant RLE pain or swelling.    OBJECTIVE:     Last Recorded Vitals:  Vitals:    06/25/25 0330 06/25/25 0400 06/25/25 0806 06/25/25 1100   BP: 129/65 126/66 118/63 122/64   BP Location:   Left arm Left arm   Patient Position:   Lying Lying   Pulse: 80 77 78 75   Resp: 16 16 16 16   Temp: 36.4 °C (97.5 °F) 36.5 °C (97.7 °F) 36.2 °C (97.2 °F) 36.5 °C (97.7 °F)   TempSrc: Temporal Temporal Temporal Temporal   SpO2: 100% 98% 96% 96%   Weight:       Height:         Last I/O:  I/O last 3 completed shifts:  In: 2546.5 (41.7 mL/kg) [P.O.:300; I.V.:1416.7 (23.2 mL/kg); Blood:629.8; IV Piggyback:200]  Out: 350 (5.7 mL/kg) [Urine:350 (0.2 mL/kg/hr)]  Weight: 61 kg     Physical Exam    GEN: ill appearing; pale  HEENT: NCAT, PERRLA, EOMI, moist mucous membranes  NECK: supple, no masses  CV: RRR, no m/r/g, no LE edema  LUNGS: CTAB, no w/r/c  ABD: soft, mild diffuse TTP without rebound or guarding  SKIN: no rashes  MSK; no gross deformities, normal joints  NEURO: A+Ox3, no FND  PSYCH: appropriate mood, affect    Inpatient Medications:  Scheduled Medications[1]    PRN Medications  PRN Medications[2]    Continuous Medications:  Continuous Medications[3]  LABS AND IMAGING:     Labs:  Results from last 7 days   Lab Units 06/25/25  0531 06/24/25  1344 06/19/25  1621   WBC AUTO x10*3/uL 14.2* 12.2*  --    [facility] WBC AUTO Thousand/uL  --   --  12.7*   RBC AUTO x10*6/uL 4.01 2.93*  --    [facility] RBC AUTO Million/uL  --   --  3.31*   HEMOGLOBIN g/dL 9.9* 6.5*  --    [facility] HEMOGLOBIN g/dL  --   --  7.5*   HEMATOCRIT % 31.5* 21.8*  --    [facility] HEMATOCRIT %  --   --  25.7*   MCV fL 79* 74*  --    [facility] MCV fL  --   --  77.6*   MCH pg 24.7* 22.2*  --    [facility] MCH pg  --   --  22.7*   MCHC g/dL 31.4* 29.8*  --    [facility] MCHC g/dL  --   --  29.2*   RDW % 18.6* 17.4*  --    [facility] RDW %  --   --  16.0*   PLATELETS AUTO x10*3/uL 423 438  --    [facility] PLATELETS AUTO Thousand/uL  --   --  577*    [facility] MPV fL  --   --  8.9     Results from last 7 days   Lab Units 06/25/25  0531 06/24/25  1344 06/19/25  1621   [facility] SODIUM mmol/L  --   --  134*   SODIUM mmol/L 137 135*  --    [facility] POTASSIUM mmol/L  --   --  3.4*   POTASSIUM mmol/L 3.7 2.9*  --    [facility] CHLORIDE mmol/L  --   --  99   CHLORIDE mmol/L 109* 102  --    [facility] CO2 mmol/L  --   --  23   CO2 mmol/L 21 24  --    BUN mg/dL 8 11  --    [facility] BUN mg/dL  --   --  12   CREATININE mg/dL 0.57 0.77  --    [facility] CREATININE mg/dL  --   --  0.70   [facility] GLUCOSE mg/dL  --   --  95   GLUCOSE mg/dL 140* 105*  --    [facility] PROTEIN TOTAL g/dL  --   --  6.6   PROTEIN TOTAL g/dL 6.5 6.5  --    [facility] CALCIUM mg/dL  --   --  8.0*   CALCIUM mg/dL 7.9* 7.9*  --    BILIRUBIN TOTAL mg/dL 0.6 0.4  --    [facility] BILIRUBIN TOTAL mg/dL  --   --  0.4   [facility] ALK PHOS U/L  --   --  76   ALK PHOS U/L 76 75  --    [facility] AST U/L  --   --  12   AST U/L 10 10  --    [facility] ALT U/L  --   --  7   ALT U/L 8 8  --      Results from last 7 days   Lab Units 06/19/25  1621   [facility] MAGNESIUM mg/dL 2.2     Results from last 7 days   Lab Units 06/24/25  1344   TROPHS ng/L 6     Imaging:  Vascular US lower extremity venous duplex bilateral  Narrative: STUDY:  Bilateral Lower Extremity Venous Doppler Ultrasound; 6/24/2025 5:47 PM  INDICATION:  Thrombus seen in IVC and right iliac vein on CT.  COMPARISON:  None available.  ACCESSION NUMBER(S):  [id number]  ORDERING CLINICIAN:  PARDEEP GUTIERREZ  TECHNIQUE:    Real-time grayscale imaging, color Doppler flow imaging, and spectral  Doppler imaging of the bilateral lower extremity veins was performed.  FINDINGS:   There is chronic occlusive DVT demonstrated within the bilateral  femoral and left peroneal veins.  There is chronic nonocclusive DVT  within the bilateral popliteal veins.  The bilateral common femoral and profunda femoral veins demonstrated  normal compressibility, normal phasic venous flow and normal response  to augmentation.  There is no  evidence for echogenic thrombi.    Impression: Evidence for chronic occlusive DVT within the bilateral femoral and  left peroneal veins and chronic nonocclusive DVT within the bilateral  popliteal veins.  Signed by Ken Hampton MD  CT abdomen pelvis w IV contrast  Narrative: Interpreted By:  Emeterio Oh,   STUDY:  CT ABDOMEN PELVIS W IV CONTRAST;  6/24/2025 2:53 pm      INDICATION:  70 y/o   F with  Signs/Symptoms:ABD pain, bloody diarrhea.          LIMITATIONS:  None.      ACCESSION NUMBER(S):  [id number]      ORDERING CLINICIAN:  PARDEEP GUTIERREZ      TECHNIQUE:  After the administration of     IV nonionic contrast, spiral axial  images were obtained from the xiphoid down through the symphysis  pubis. Sagittal and coronal reconstruction images were generated.  Bone, mediastinal, lung, and liver windows were reviewed. Omnipaque  350   75 ML      COMPARISON:  Most recent prior is an unenhanced exam from 06/20/2025..      FINDINGS:  LUNG BASES:  No mass or pneumonia or pleural effusion in either lung base.. Mild  stable bronchiectasis in the posteromedial lung bases, right greater  than left.      LIVER:  No hepatomegaly.  Liver density was  within the limits of normal.  No  liver lesion evident in this  exam.      GALLBLADDER:  No calcified stone, gallbladder wall thickening, or adjacent edema.      BILE DUCTS:  No intrahepatic biliary ductal dilatation.  Common bile duct was  within the limits of normal.      SPLEEN:  No splenomegaly.  No splenic mass..      PANCREAS:  No pancreatic mass or inflammation, or ductal dilatation.      KIDNEYS/ADRENALS:  No adrenal mass or enlargement.  No calcified stone, hydronephrosis, mass, or perinephric edema in  either kidney. No ureteral stone or dilatation.      BLADDER/PELVIS:  Urinary bladder was grossly intact.  No uterine enlargement or gross adnexal mass.      GREAT VESSELS/RETROPERITONEUM:  There is central lucent filling defect consistent with venous  thrombosis that  extends from the mid to distal right external iliac  vein on image 102 cephalad through the right common iliac vein and  then cephalad in the inferior vena cava, ending approximately 2.2 cm  below the entrance of the right renal vein into the inferior vena  cava. There is no similar filling defect on the left. There are mural  calcifications in the abdominal aorta down through the iliac  arteries. No abdominal aortic aneurysm or gross dissection. Very mild  nonspecific and grossly stable right and left periaortic  retroperitoneal adenopathy measuring 10 mm short axis or less. No  suspicious mesenteric adenopathy. No suspicious pelvic or inguinal  adenopathy.      PERITONEUM:  No ascites.  No pneumoperitoneum.  No peritoneal or mesenteric mass or inflammation.      BOWEL:  The stomach was  grossly intact.  There was no small bowel dilatation or small bowel wall thickening.  No small-bowel obstruction. There is mild hypodense colonic wall  thickening beginning in the region of the hepatic flexure and  extending through the transverse colon, down the left colon, and  through the rectum. There is also mild prominence of the mesenteric  vessels  adjacent to the thickened large bowel loops. There is subtle  mesenteric edema adjacent to portions of the left colon and rectum.  There is no extraluminal gas. No bowel obstruction. No air-fluid  collection. The cecal appendix was intact.      BONES:  No destructive lytic or blastic bone lesion. Stable grade 1  retrolisthesis of L4 over L5. Mild disc space narrowing with endplate  spurring at L2-3 and L4-5. Interfacet hypertrophic arthritic changes  at L4-5 and L5-S1. Mild sclerotic arthritic changes in both SI joints.      ABDOMINAL WALL:  Tiny fat containing umbilical hernia..      Impression: Acute nonocclusive deep venous thrombosis involving the right  external iliac vein, right common iliac vein, and the infrarenal IVC.  Please see above for details.      Nonspecific mild  colitis extending from the hepatic flexure down  through the rectum. Differential considerations include inflammatory  bowel disease (ulcer colitis), infectious colitis, and ischemic  colitis. Suggest direct visualization with colonoscopy. Currently  without CT evidence of bowel obstruction or perforation.      Arthritic changes in the lumbosacral spine as described.      Grossly stable mild bronchiectasis at the posteromedial lung bases,  right greater than left.      MACRO:  Emeterio Oh discussed the significance and urgency of this critical  finding by EPIC secure chat with  PARDEEP GUTIERREZ on 6/24/2025 at 3:14  pm.  (**-RCF-**) Findings:  See findings.      Signed by: Emeterio Oh 6/24/2025 3:14 PM  Dictation workstation:   [id number]  ECG 12 lead  Normal sinus rhythm  Possible Left atrial enlargement  Nonspecific T wave abnormality  Prolonged QT  Abnormal ECG  When compared with ECG of 12-APR-2018 12:47,  Questionable change in QRS axis  Nonspecific T wave abnormality now evident in Anterior leads            [1] enoxaparin, 40 mg, subcutaneous, q24h  methylPREDNISolone sodium succinate (PF), 40 mg, intravenous, q12h  pantoprazole, 40 mg, intravenous, Daily  [2] PRN medications: HYDROmorphone, ondansetron ODT **OR** ondansetron  [3] sodium chloride 0.9%, 100 mL/hr, Last Rate: 100 mL/hr (06/24/25 2331)  sodium chloride 0.9%, 100 mL/hr, Last Rate: 100 mL/hr (06/24/25 2331)

## 2025-06-25 NOTE — CONSULTS
Inpatient consult to Vascular Surgery  Consult performed by: NATHAN Fernandez-CNP  Consult ordered by: Juan Daniel Hinojosa MD  Reason for consult: Acute DVT involving infrarenal IVC and R iliacs; unable to tolerate OAC due to acute blood loss anemia          Reason For Consult  Acute DVT involving infrarenal IVC and R iliacs; unable to tolerate OAC due to acute blood loss anemia    History Of Present Illness  Leola Jennings is a 69 y.o. female with PMHx of HTN, HLD, ulcerative colitis, and remote smoking history who presented to [facility] ED c/o abdominal pain associated with bloody and mucousy stools. Afebrile and hemodynamically stable on arrival. Labs notable for WBC 12.2, Hgb 6.5, GFR 83, K+ 2.9, INR 1.3, CRP 9.14, and Lactate 1.9. CT a/p with contrast revealed incidental finding of nonocclusive thrombus to infrarenal IVC and right external and common iliacs; additional findings as noted below. PT was given 1L NS, medicated for pain/nausea, and potassium replacement. 2 units PRBC's given. Pt was admitted to telemetry with GI consult for further management of colitis and GI bleed. PT was given a 1x prophylactic dose of Lovenox. Vascular surgery has been consulted for consideration of IVC filter.     Pt seen in bed. She is afebrile and HDS. Slight bump in WBC's to 14.2 today but patient was given dose of IV steroid last night. Hgb improved to 9.9 following 2 units PRBC's. Pt reports hx of LLE DVT ~30 years ago. She was treated with OAC. She is a former smoker but quit in 2019. She has a very active lifestyle although admits is has been somewhat limited recently due to her GI issues. Reportedly, they wanted her to be on life long OAC but she did not want that and stopped after symptoms resolved. She has had no recurrent clotting events and has not been on anticoagulation since that time. She reports diarrhea has slowed since admission but she did have recurrent bloody stool following Lovenox administration. She  denies lower extremity swelling. On exam, she has trace bilateral LE edema but all compartments are soft. Feet are well perfused with easily palpable pulses. Findings reviewed with Dr. Humphreys who agrees with IVC filter placement. Unfortunately he is at [facility] [place]. Discussed with Dr. Valles who is able to do procedure today. Education regarding procedure discussed at length with patient who is agreeable to proceed. Case request submitted. Please continue NPO; post procedure can resume oral intake from vascular perspective but will defer diet advancement to GI. Thank you for this consult.      Past Medical History  She has a past medical history of Acute vaginitis (05/23/2019), Celiac disease ([facility]), Cigarette nicotine dependence without complication (08/15/2023), Encounter for general adult medical examination without abnormal findings (05/23/2019), Encounter for screening for malignant neoplasm of colon (03/15/2021), Nonscarring hair loss, unspecified (08/09/2019), Personal history of other diseases of the digestive system (03/11/2020), Personal history of other diseases of the musculoskeletal system and connective tissue, Personal history of other diseases of the respiratory system (04/08/2020), Personal history of other diseases of the respiratory system (04/22/2020), and Personal history of other specified conditions (07/15/2019).    Surgical History  She has a past surgical history that includes Other surgical history (03/13/2017) and Other surgical history (07/15/2019).     Social History  She reports that she has quit smoking. Her smoking use included cigarettes. She started smoking about 6 years ago. She has never used smokeless tobacco. She reports that she does not currently use alcohol. She reports that she does not use drugs.    Family History  Family History[1]     Allergies  Patient has no known allergies.    Review of Systems   Constitutional:  Positive for activity change and fatigue.    Respiratory: Negative.     Cardiovascular: Negative.  Negative for leg swelling.   Gastrointestinal:  Positive for abdominal pain, blood in stool, diarrhea and nausea.   Musculoskeletal: Negative.    Skin: Negative.    Neurological: Negative.    All other systems reviewed and are negative.       Physical Exam  Vitals and nursing note reviewed.   Constitutional:       General: She is not in acute distress.     Appearance: Normal appearance. She is normal weight.   HENT:      Head: Normocephalic and atraumatic.      Right Ear: External ear normal.      Left Ear: External ear normal.      Nose: Nose normal.      Mouth/Throat:      Mouth: Mucous membranes are moist.      Pharynx: Oropharynx is clear.   Eyes:      Extraocular Movements: Extraocular movements intact.      Pupils: Pupils are equal, round, and reactive to light.   Cardiovascular:      Rate and Rhythm: Normal rate and regular rhythm.      Pulses: Normal pulses.   Abdominal:      General: Abdomen is flat. Bowel sounds are normal.      Palpations: Abdomen is soft.      Tenderness: There is abdominal tenderness. There is no guarding.   Musculoskeletal:      Cervical back: Normal range of motion.      Right lower leg: No edema.      Left lower leg: No edema.   Skin:     General: Skin is warm and dry.   Neurological:      General: No focal deficit present.      Mental Status: She is alert and oriented to person, place, and time.   Psychiatric:         Mood and Affect: Mood normal.         Behavior: Behavior normal.          Last Recorded Vitals  Blood pressure 118/63, pulse 78, temperature 36.2 °C (97.2 °F), temperature source Temporal, resp. rate 16, height 1.524 m (5'), weight 61 kg (134 lb 8 oz), SpO2 96%.    Relevant Results  Scheduled medications  Scheduled Medications[2]  Continuous medications  Continuous Medications[3]  PRN medications  PRN Medications[4]      Vascular US lower extremity venous duplex bilateral  Result Date: 6/24/2025  STUDY: Bilateral  Lower Extremity Venous Doppler Ultrasound; 6/24/2025 5:47 PM INDICATION: Thrombus seen in IVC and right iliac vein on CT. COMPARISON: None available. ACCESSION NUMBER(S): [id number] ORDERING CLINICIAN: PARDEEP GUTIERREZ TECHNIQUE:  Real-time grayscale imaging, color Doppler flow imaging, and spectral Doppler imaging of the bilateral lower extremity veins was performed. FINDINGS: There is chronic occlusive DVT demonstrated within the bilateral femoral and left peroneal veins.  There is chronic nonocclusive DVT within the bilateral popliteal veins. The bilateral common femoral and profunda femoral veins demonstrated normal compressibility, normal phasic venous flow and normal response to augmentation.  There is no evidence for echogenic thrombi.      Evidence for chronic occlusive DVT within the bilateral femoral and left peroneal veins and chronic nonocclusive DVT within the bilateral popliteal veins. Signed by Ken Hampton MD    CT abdomen pelvis w IV contrast  Result Date: 6/24/2025  Interpreted By:  Emeterio Oh, STUDY: CT ABDOMEN PELVIS W IV CONTRAST;  6/24/2025 2:53 pm   INDICATION: 70 y/o   F with  Signs/Symptoms:ABD pain, bloody diarrhea.     LIMITATIONS: None.   ACCESSION NUMBER(S): [id number]   ORDERING CLINICIAN: PARDEEP GUTIERREZ   TECHNIQUE: After the administration of     IV nonionic contrast, spiral axial images were obtained from the xiphoid down through the symphysis pubis. Sagittal and coronal reconstruction images were generated. Bone, mediastinal, lung, and liver windows were reviewed. Omnipaque 350   75 ML   COMPARISON: Most recent prior is an unenhanced exam from 06/20/2025..   FINDINGS: LUNG BASES: No mass or pneumonia or pleural effusion in either lung base.. Mild stable bronchiectasis in the posteromedial lung bases, right greater than left.   LIVER: No hepatomegaly. Liver density was  within the limits of normal. No  liver lesion evident in this  exam.   GALLBLADDER: No calcified stone,  gallbladder wall thickening, or adjacent edema.   BILE DUCTS: No intrahepatic biliary ductal dilatation.  Common bile duct was within the limits of normal.   SPLEEN: No splenomegaly. No splenic mass..   PANCREAS: No pancreatic mass or inflammation, or ductal dilatation.   KIDNEYS/ADRENALS: No adrenal mass or enlargement. No calcified stone, hydronephrosis, mass, or perinephric edema in either kidney. No ureteral stone or dilatation.   BLADDER/PELVIS: Urinary bladder was grossly intact. No uterine enlargement or gross adnexal mass.   GREAT VESSELS/RETROPERITONEUM: There is central lucent filling defect consistent with venous thrombosis that extends from the mid to distal right external iliac vein on image 102 cephalad through the right common iliac vein and then cephalad in the inferior vena cava, ending approximately 2.2 cm below the entrance of the right renal vein into the inferior vena cava. There is no similar filling defect on the left. There are mural calcifications in the abdominal aorta down through the iliac arteries. No abdominal aortic aneurysm or gross dissection. Very mild nonspecific and grossly stable right and left periaortic retroperitoneal adenopathy measuring 10 mm short axis or less. No suspicious mesenteric adenopathy. No suspicious pelvic or inguinal adenopathy.   PERITONEUM: No ascites. No pneumoperitoneum. No peritoneal or mesenteric mass or inflammation.   BOWEL: The stomach was  grossly intact. There was no small bowel dilatation or small bowel wall thickening. No small-bowel obstruction. There is mild hypodense colonic wall thickening beginning in the region of the hepatic flexure and extending through the transverse colon, down the left colon, and through the rectum. There is also mild prominence of the mesenteric vessels  adjacent to the thickened large bowel loops. There is subtle mesenteric edema adjacent to portions of the left colon and rectum. There is no extraluminal gas. No bowel  obstruction. No air-fluid collection. The cecal appendix was intact.   BONES: No destructive lytic or blastic bone lesion. Stable grade 1 retrolisthesis of L4 over L5. Mild disc space narrowing with endplate spurring at L2-3 and L4-5. Interfacet hypertrophic arthritic changes at L4-5 and L5-S1. Mild sclerotic arthritic changes in both SI joints.   ABDOMINAL WALL: Tiny fat containing umbilical hernia..       Acute nonocclusive deep venous thrombosis involving the right external iliac vein, right common iliac vein, and the infrarenal IVC. Please see above for details.   Nonspecific mild colitis extending from the hepatic flexure down through the rectum. Differential considerations include inflammatory bowel disease (ulcer colitis), infectious colitis, and ischemic colitis. Suggest direct visualization with colonoscopy. Currently without CT evidence of bowel obstruction or perforation.   Arthritic changes in the lumbosacral spine as described.   Grossly stable mild bronchiectasis at the posteromedial lung bases, right greater than left.   MACRO: Emeterio Oh discussed the significance and urgency of this critical finding by EPIC secure chat with  PARDEEP GUTIERREZ on 6/24/2025 at 3:14 pm.  (**-RCF-**) Findings:  See findings.   Signed by: Emeterio Oh 6/24/2025 3:14 PM Dictation workstation:   [id number]    ECG 12 lead  Result Date: 6/24/2025  Normal sinus rhythm Possible Left atrial enlargement Nonspecific T wave abnormality Prolonged QT Abnormal ECG When compared with ECG of 12-APR-2018 12:47, Questionable change in QRS axis Nonspecific T wave abnormality now evident in Anterior leads           Assessment/Plan     Deep vein thrombosis  Incidental finding of non-occlusive thrombus to infrarenal IVC and R external and common iliac veins. Venous duplex revealed chronic appearing occlusive thrombus to bilateral femoral veins. Unable to initiate OAC due to GI bleeding.   -Discussed with Dr. Humphreys who agrees with filter  placement but is unfortunately at [facility] [place]. Discussed with Dr. Valles who is able to do procedure this afternoon.   -IVC placement this afternoon with Dr. Valles; case request submitted  -please continue NPO. Post procedure, pt may resume oral intake from vascular perspective but will defer to GI given acute abdominal issues.   -thank you for this consult    GI bleeding; Ulcerative Colits  Hx of ulcerative colitis, previously on biologics but of recent has been managed with steroid tapers.   Hgb 6.5 on presentation. FOBT +. Had recurrent episode of bloody stool following Lovenox administration (prophylactic dose).   6/25: Hgb corrected to 9.9 following 2 units PRBC's.   -close monitoring for bleeding  -follow H&H and transfuse for Hgb<7  -GI consult pending        I spent 60 minutes in the professional and overall care of this patient.               [1]   Family History  Problem Relation Name Age of Onset    Heart failure Mother      Hypertension Mother      Spondylolysis Mother      Heart attack Father      Other (pace maker) Father      Heart failure Sister      Obesity Sister      Seizures Sister      Hashimoto's thyroiditis Sister      Lupus Sister      Spondylolysis Brother     [2] enoxaparin, 40 mg, subcutaneous, q24h  methylPREDNISolone sodium succinate (PF), 40 mg, intravenous, q12h  pantoprazole, 40 mg, intravenous, Daily  [3] sodium chloride 0.9%, 100 mL/hr, Last Rate: 100 mL/hr (06/24/25 2331)  sodium chloride 0.9%, 100 mL/hr, Last Rate: 100 mL/hr (06/24/25 2331)  [4] PRN medications: HYDROmorphone, ondansetron ODT **OR** ondansetron

## 2025-06-26 ENCOUNTER — TELEPHONE (OUTPATIENT)
Dept: PRIMARY CARE | Facility: CLINIC | Age: 70
End: 2025-06-26

## 2025-06-26 ENCOUNTER — APPOINTMENT (OUTPATIENT)
Dept: RADIOLOGY | Facility: HOSPITAL | Age: 70
End: 2025-06-26
Payer: MEDICARE

## 2025-06-26 ENCOUNTER — TELEPHONE (OUTPATIENT)
Dept: VASCULAR SURGERY | Facility: CLINIC | Age: 70
End: 2025-06-26
Payer: MEDICARE

## 2025-06-26 ENCOUNTER — APPOINTMENT (OUTPATIENT)
Dept: GASTROENTEROLOGY | Facility: HOSPITAL | Age: 70
End: 2025-06-26
Payer: MEDICARE

## 2025-06-26 ENCOUNTER — ANESTHESIA EVENT (OUTPATIENT)
Dept: GASTROENTEROLOGY | Facility: HOSPITAL | Age: 70
End: 2025-06-26
Payer: MEDICARE

## 2025-06-26 ENCOUNTER — ANESTHESIA (OUTPATIENT)
Dept: GASTROENTEROLOGY | Facility: HOSPITAL | Age: 70
End: 2025-06-26
Payer: MEDICARE

## 2025-06-26 DIAGNOSIS — Z95.828 PRESENCE OF IVC FILTER: ICD-10-CM

## 2025-06-26 LAB
ALBUMIN SERPL BCP-MCNC: 2.6 G/DL (ref 3.4–5)
ALP SERPL-CCNC: 72 U/L (ref 33–136)
ALT SERPL W P-5'-P-CCNC: 8 U/L (ref 7–45)
ANION GAP SERPL CALC-SCNC: 10 MMOL/L (ref 10–20)
AST SERPL W P-5'-P-CCNC: 11 U/L (ref 9–39)
BASOPHILS # BLD AUTO: 0.02 X10*3/UL (ref 0–0.1)
BASOPHILS NFR BLD AUTO: 0.1 %
BILIRUB SERPL-MCNC: 0.4 MG/DL (ref 0–1.2)
BUN SERPL-MCNC: 9 MG/DL (ref 6–23)
CALCIUM SERPL-MCNC: 8 MG/DL (ref 8.6–10.3)
CHLORIDE SERPL-SCNC: 112 MMOL/L (ref 98–107)
CO2 SERPL-SCNC: 22 MMOL/L (ref 21–32)
CREAT SERPL-MCNC: 0.56 MG/DL (ref 0.5–1.05)
EGFRCR SERPLBLD CKD-EPI 2021: >90 ML/MIN/1.73M*2
EOSINOPHIL # BLD AUTO: 0 X10*3/UL (ref 0–0.7)
EOSINOPHIL NFR BLD AUTO: 0 %
ERYTHROCYTE [DISTWIDTH] IN BLOOD BY AUTOMATED COUNT: 18.6 % (ref 11.5–14.5)
GLUCOSE SERPL-MCNC: 123 MG/DL (ref 74–99)
HCT VFR BLD AUTO: 30.3 % (ref 36–46)
HGB BLD-MCNC: 9.4 G/DL (ref 12–16)
HOLD SPECIMEN: NORMAL
IMM GRANULOCYTES # BLD AUTO: 0.1 X10*3/UL (ref 0–0.7)
IMM GRANULOCYTES NFR BLD AUTO: 0.7 % (ref 0–0.9)
LYMPHOCYTES # BLD AUTO: 1.73 X10*3/UL (ref 1.2–4.8)
LYMPHOCYTES NFR BLD AUTO: 12.4 %
MCH RBC QN AUTO: 24.8 PG (ref 26–34)
MCHC RBC AUTO-ENTMCNC: 31 G/DL (ref 32–36)
MCV RBC AUTO: 80 FL (ref 80–100)
MONOCYTES # BLD AUTO: 0.65 X10*3/UL (ref 0.1–1)
MONOCYTES NFR BLD AUTO: 4.7 %
NEUTROPHILS # BLD AUTO: 11.44 X10*3/UL (ref 1.2–7.7)
NEUTROPHILS NFR BLD AUTO: 82.1 %
NRBC BLD-RTO: 0.1 /100 WBCS (ref 0–0)
PLATELET # BLD AUTO: 360 X10*3/UL (ref 150–450)
POTASSIUM SERPL-SCNC: 4.1 MMOL/L (ref 3.5–5.3)
PROT SERPL-MCNC: 6 G/DL (ref 6.4–8.2)
RBC # BLD AUTO: 3.79 X10*6/UL (ref 4–5.2)
SODIUM SERPL-SCNC: 140 MMOL/L (ref 136–145)
WBC # BLD AUTO: 13.9 X10*3/UL (ref 4.4–11.3)

## 2025-06-26 PROCEDURE — 3700000001 HC GENERAL ANESTHESIA TIME - INITIAL BASE CHARGE

## 2025-06-26 PROCEDURE — 80053 COMPREHEN METABOLIC PANEL: CPT | Performed by: INTERNAL MEDICINE

## 2025-06-26 PROCEDURE — 88305 TISSUE EXAM BY PATHOLOGIST: CPT | Performed by: PATHOLOGY

## 2025-06-26 PROCEDURE — 2500000004 HC RX 250 GENERAL PHARMACY W/ HCPCS (ALT 636 FOR OP/ED): Performed by: NURSE ANESTHETIST, CERTIFIED REGISTERED

## 2025-06-26 PROCEDURE — 1200000002 HC GENERAL ROOM WITH TELEMETRY DAILY

## 2025-06-26 PROCEDURE — 99232 SBSQ HOSP IP/OBS MODERATE 35: CPT | Performed by: NURSE PRACTITIONER

## 2025-06-26 PROCEDURE — 88341 IMHCHEM/IMCYTCHM EA ADD ANTB: CPT | Performed by: PATHOLOGY

## 2025-06-26 PROCEDURE — 99232 SBSQ HOSP IP/OBS MODERATE 35: CPT | Performed by: INTERNAL MEDICINE

## 2025-06-26 PROCEDURE — 87506 IADNA-DNA/RNA PROBE TQ 6-11: CPT | Mod: ELYLAB | Performed by: NURSE PRACTITIONER

## 2025-06-26 PROCEDURE — 0753T DGTZ GLS MCRSCP SLD LEVEL IV: CPT | Mod: TC,ELYLAB | Performed by: STUDENT IN AN ORGANIZED HEALTH CARE EDUCATION/TRAINING PROGRAM

## 2025-06-26 PROCEDURE — 3700000002 HC GENERAL ANESTHESIA TIME - EACH INCREMENTAL 1 MINUTE

## 2025-06-26 PROCEDURE — 2500000004 HC RX 250 GENERAL PHARMACY W/ HCPCS (ALT 636 FOR OP/ED): Performed by: INTERNAL MEDICINE

## 2025-06-26 PROCEDURE — 0DBM8ZX EXCISION OF DESCENDING COLON, VIA NATURAL OR ARTIFICIAL OPENING ENDOSCOPIC, DIAGNOSTIC: ICD-10-PCS | Performed by: STUDENT IN AN ORGANIZED HEALTH CARE EDUCATION/TRAINING PROGRAM

## 2025-06-26 PROCEDURE — 85025 COMPLETE CBC W/AUTO DIFF WBC: CPT | Performed by: INTERNAL MEDICINE

## 2025-06-26 PROCEDURE — 88342 IMHCHEM/IMCYTCHM 1ST ANTB: CPT | Performed by: PATHOLOGY

## 2025-06-26 PROCEDURE — 36415 COLL VENOUS BLD VENIPUNCTURE: CPT | Performed by: INTERNAL MEDICINE

## 2025-06-26 PROCEDURE — 45331 SIGMOIDOSCOPY AND BIOPSY: CPT | Performed by: STUDENT IN AN ORGANIZED HEALTH CARE EDUCATION/TRAINING PROGRAM

## 2025-06-26 PROCEDURE — 83993 ASSAY FOR CALPROTECTIN FECAL: CPT | Performed by: NURSE PRACTITIONER

## 2025-06-26 RX ORDER — PROPOFOL 10 MG/ML
INJECTION, EMULSION INTRAVENOUS AS NEEDED
Status: DISCONTINUED | OUTPATIENT
Start: 2025-06-26 | End: 2025-06-26

## 2025-06-26 RX ORDER — SODIUM CHLORIDE, SODIUM LACTATE, POTASSIUM CHLORIDE, CALCIUM CHLORIDE 600; 310; 30; 20 MG/100ML; MG/100ML; MG/100ML; MG/100ML
INJECTION, SOLUTION INTRAVENOUS CONTINUOUS PRN
Status: DISCONTINUED | OUTPATIENT
Start: 2025-06-26 | End: 2025-06-26

## 2025-06-26 RX ADMIN — SODIUM CHLORIDE, POTASSIUM CHLORIDE, SODIUM LACTATE AND CALCIUM CHLORIDE: 600; 310; 30; 20 INJECTION, SOLUTION INTRAVENOUS at 17:29

## 2025-06-26 RX ADMIN — PROPOFOL 200 MG: 10 INJECTION, EMULSION INTRAVENOUS at 17:36

## 2025-06-26 RX ADMIN — PANTOPRAZOLE SODIUM 40 MG: 40 INJECTION, POWDER, LYOPHILIZED, FOR SOLUTION INTRAVENOUS at 08:53

## 2025-06-26 RX ADMIN — HYDROMORPHONE HYDROCHLORIDE 0.4 MG: 1 INJECTION, SOLUTION INTRAMUSCULAR; INTRAVENOUS; SUBCUTANEOUS at 04:31

## 2025-06-26 RX ADMIN — METHYLPREDNISOLONE SODIUM SUCCINATE 40 MG: 40 INJECTION, POWDER, FOR SOLUTION INTRAMUSCULAR; INTRAVENOUS at 08:54

## 2025-06-26 ASSESSMENT — COGNITIVE AND FUNCTIONAL STATUS - GENERAL
WALKING IN HOSPITAL ROOM: A LOT
MOVING TO AND FROM BED TO CHAIR: A LITTLE
MOBILITY SCORE: 14
DRESSING REGULAR UPPER BODY CLOTHING: A LOT
DRESSING REGULAR UPPER BODY CLOTHING: A LOT
PERSONAL GROOMING: A LITTLE
TURNING FROM BACK TO SIDE WHILE IN FLAT BAD: A LITTLE
MOVING FROM LYING ON BACK TO SITTING ON SIDE OF FLAT BED WITH BEDRAILS: A LITTLE
CLIMB 3 TO 5 STEPS WITH RAILING: TOTAL
EATING MEALS: A LITTLE
TOILETING: A LOT
STANDING UP FROM CHAIR USING ARMS: A LOT
EATING MEALS: A LITTLE
STANDING UP FROM CHAIR USING ARMS: A LOT
HELP NEEDED FOR BATHING: A LOT
TOILETING: A LOT
CLIMB 3 TO 5 STEPS WITH RAILING: TOTAL
PERSONAL GROOMING: A LITTLE
MOBILITY SCORE: 14
MOVING FROM LYING ON BACK TO SITTING ON SIDE OF FLAT BED WITH BEDRAILS: A LITTLE
MOVING TO AND FROM BED TO CHAIR: A LITTLE
HELP NEEDED FOR BATHING: A LOT
WALKING IN HOSPITAL ROOM: A LOT
DRESSING REGULAR LOWER BODY CLOTHING: A LOT
DAILY ACTIVITIY SCORE: 14
DAILY ACTIVITIY SCORE: 14
TURNING FROM BACK TO SIDE WHILE IN FLAT BAD: A LITTLE
DRESSING REGULAR LOWER BODY CLOTHING: A LOT

## 2025-06-26 ASSESSMENT — PAIN SCALES - GENERAL
PAIN_LEVEL: 0
PAINLEVEL_OUTOF10: 7
PAINLEVEL_OUTOF10: 0 - NO PAIN
PAINLEVEL_OUTOF10: 0 - NO PAIN

## 2025-06-26 ASSESSMENT — PAIN DESCRIPTION - ORIENTATION: ORIENTATION: RIGHT

## 2025-06-26 ASSESSMENT — PAIN - FUNCTIONAL ASSESSMENT
PAIN_FUNCTIONAL_ASSESSMENT: 0-10
PAIN_FUNCTIONAL_ASSESSMENT: WONG-BAKER FACES
PAIN_FUNCTIONAL_ASSESSMENT: 0-10

## 2025-06-26 ASSESSMENT — PAIN DESCRIPTION - LOCATION: LOCATION: GROIN

## 2025-06-26 NOTE — PROGRESS NOTES
Occupational Therapy                 Therapy Communication Note    Patient Name: Leola Jennings  MRN: [id number]  Department: [facility]  Room: [facility]/1103-A  Today's Date: 6/26/2025     Discipline: Occupational Therapy    Missed Time: Attempt    Comment: OT order received and chart reviewed. Patient is scheduled for flex sigmoidoscopy this date, RN requesting that therapy hold evaluation until after the procedure. Will re attempt as medically appropriate.

## 2025-06-26 NOTE — CARE PLAN
The patient's goals for the shift include      The clinical goals for the shift include Patient will remain hemodynamically stable throughout the shift      Problem: Safety - Adult  Goal: Free from fall injury  Outcome: Progressing     Problem: Pain - Adult  Goal: Verbalizes/displays adequate comfort level or baseline comfort level  Outcome: Progressing

## 2025-06-26 NOTE — ANESTHESIA PREPROCEDURE EVALUATION
Patient: Leola Jennings    Procedure Information       Date/Time: 06/26/25 1520    Scheduled providers: Viri Elizalde MD; Antonio Bee RN    Procedure: FLEXIBLE SIGMOIDOSCOPY    Location: [facility]            Relevant Problems   Cardiac   (+) Angina at rest   (+) Mixed hyperlipidemia      GI   (+) Ulcerative rectosigmoiditis with rectal bleeding (Multi)      Liver   (+) Acute pancreatitis      Endocrine   (+) Hyperparathyroidism (Multi)      Hematology   (+) Blood loss anemia   (+) Deep vein thrombosis (DVT) of proximal lower extremity, unspecified chronicity, unspecified laterality   (+) Iron deficiency anemia      Musculoskeletal   (+) Rheumatoid arthritis involving multiple sites with positive rheumatoid factor (Multi)       Clinical information reviewed:                   NPO Detail:  No data recorded     Physical Exam    Airway  Mallampati: II     Cardiovascular   Rhythm: regular  Rate: normal     Dental    Pulmonary - normal exam   Abdominal - normal exam           Anesthesia Plan    History of general anesthesia?: yes  History of complications of general anesthesia?: no    ASA 3     MAC     intravenous induction   Postoperative pain plan includes opioids.  Anesthetic plan and risks discussed with patient.

## 2025-06-26 NOTE — CARE PLAN
Problem: Pain - Adult  Goal: Verbalizes/displays adequate comfort level or baseline comfort level  Outcome: Progressing     Problem: Safety - Adult  Goal: Free from fall injury  Outcome: Progressing     Problem: Discharge Planning  Goal: Discharge to home or other facility with appropriate resources  Outcome: Progressing     Problem: Chronic Conditions and Co-morbidities  Goal: Patient's chronic conditions and co-morbidity symptoms are monitored and maintained or improved  Outcome: Progressing     Problem: Nutrition  Goal: Nutrient intake appropriate for maintaining nutritional needs  Outcome: Progressing   The patient's goals for the shift include      The clinical goals for the shift include remain safe and prevent falls

## 2025-06-26 NOTE — PROGRESS NOTES
Medical Group Progress Note  ASSESSMENT & PLAN:     Acute blood loss anemia, symptomatic  GI bleed  Ulcerative colitis  -having increased frequency of loose, bloody stool and associated abdominal pain  -hgb down to 6.5; baseline ~9-10  -transfusing 2uPRBC in ED  -CT abd/pel showing nonspecific mild colitis from the hepatic flexure down through the rectum probably consistent with her known UC  -has mostly been on steroids previously, has refused biologics  -looks like most recently she was on mesalamine with GI at [facility]  -start steroids  -GI consult  -clear liquid diet for now  -check ESR, CRP, calprotectin     Acute non-occlusive DVT  -involving the right extrarenal iliac vein, right common iliac vein, infrarenal IVC  -likely related to her IBD  -don't think we can fully anticoagulate at this point given her significant symptomatic anemia and GI bleeding  -will get formal bilateral duplex  -I will give prophylactic dose lovenox for now and follow her stool  -will have vascular see; would see if IVC filter can be placed but not sure if possible with IVC involvement; ? Whether thrombectomy is a possibility     Hypokalemia  -replete     HTN  HLD  -hold antihypertensives for now              VTE Prophylaxis: held for now    6/26/25:  Hgb stable 9.5 today  Still having some loose and bloody stools  Cont IV steroids for now will decrease to daily  Plan for flex sig today with GI  IVC filter placed yesterday, lovenox discontinued  Plan to remove IVC filter and start on anticoagulation when GI issues are under better control and hgb stable  C. Diff negative, stool PCR panel pending  PT/OT, suspect will need SNF      Juan Daniel Hinojosa MD    SUBJECTIVE     No overnight events. Having some pain at groin access site. Still abd cramping pain. Loose bloody stools intermittently.     OBJECTIVE:     Last Recorded Vitals:  Vitals:    06/26/25 0009 06/26/25 0340 06/26/25 0734 06/26/25 1124   BP: 120/69 151/81 142/74 128/73   BP  Location: Left arm Left arm Left arm    Patient Position: Lying Lying Lying    Pulse: 79 74 81 93   Resp: 18 18 16    Temp: 35.9 °C (96.6 °F) 36 °C (96.8 °F) 36.7 °C (98.1 °F) 36.3 °C (97.3 °F)   TempSrc: Temporal Temporal Temporal    SpO2: 97% 99% 98% 95%   Weight:       Height:         Last I/O:  I/O last 3 completed shifts:  In: 3146.5 (51.6 mL/kg) [P.O.:900; I.V.:1416.7 (23.2 mL/kg); Blood:629.8; IV Piggyback:200]  Out: 355 (5.8 mL/kg) [Urine:350 (0.2 mL/kg/hr); Blood:5]  Weight: 61 kg     Physical Exam    GEN: ill appearing; pale  HEENT: NCAT, PERRLA, EOMI, moist mucous membranes  NECK: supple, no masses  CV: RRR, no m/r/g, no LE edema  LUNGS: CTAB, no w/r/c  ABD: soft, mild diffuse TTP without rebound or guarding  SKIN: no rashes  MSK; no gross deformities, normal joints; R groin site c/d/I no swelling  NEURO: A+Ox3, no FND  PSYCH: appropriate mood, affect    Inpatient Medications:  Scheduled Medications[1]    PRN Medications  PRN Medications[2]    Continuous Medications:  Continuous Medications[3]  LABS AND IMAGING:     Labs:  Results from last 7 days   Lab Units 06/26/25  0545 06/25/25  0531 06/24/25  1344 06/19/25  1621   WBC AUTO x10*3/uL 13.9* 14.2* 12.2*  --    [facility] WBC AUTO Thousand/uL  --   --   --  12.7*   RBC AUTO x10*6/uL 3.79* 4.01 2.93*  --    [facility] RBC AUTO Million/uL  --   --   --  3.31*   HEMOGLOBIN g/dL 9.4* 9.9* 6.5*  --    [facility] HEMOGLOBIN g/dL  --   --   --  7.5*   HEMATOCRIT % 30.3* 31.5* 21.8*  --    [facility] HEMATOCRIT %  --   --   --  25.7*   MCV fL 80 79* 74*  --    [facility] MCV fL  --   --   --  77.6*   MCH pg 24.8* 24.7* 22.2*  --    [facility] MCH pg  --   --   --  22.7*   MCHC g/dL 31.0* 31.4* 29.8*  --    [facility] MCHC g/dL  --   --   --  29.2*   RDW % 18.6* 18.6* 17.4*  --    [facility] RDW %  --   --   --  16.0*   PLATELETS AUTO x10*3/uL 360 423 438  --    [facility] PLATELETS AUTO Thousand/uL  --   --   --  577*   [facility] MPV fL  --   --   --  8.9     Results from last 7 days   Lab Units 06/26/25  0551  06/25/25  0531 06/24/25  1344   SODIUM mmol/L 140 137 135*   POTASSIUM mmol/L 4.1 3.7 2.9*   CHLORIDE mmol/L 112* 109* 102   CO2 mmol/L 22 21 24   BUN mg/dL 9 8 11   CREATININE mg/dL 0.56 0.57 0.77   GLUCOSE mg/dL 123* 140* 105*   PROTEIN TOTAL g/dL 6.0* 6.5 6.5   CALCIUM mg/dL 8.0* 7.9* 7.9*   BILIRUBIN TOTAL mg/dL 0.4 0.6 0.4   ALK PHOS U/L 72 76 75   AST U/L 11 10 10   ALT U/L 8 8 8     Results from last 7 days   Lab Units 06/19/25  1621   [facility] MAGNESIUM mg/dL 2.2     Results from last 7 days   Lab Units 06/24/25  1344   TROPHS ng/L 6     Imaging:  Invasive vascular procedure     [facility], Cath Lab         [address] [place] [address]    Cardiovascular Catheterization Report    Patient Name:      MERVIN DENNEY   Performing           [id number] Sidra Valles                                         Physician:           MD  Study Date:        6/25/2025           Verifying Physician: Adolfo Valles MD  MRN/PID:           [id number]            Cardiologist:  Accession#:        [id number]        Ordering Provider:   [id number] COLBERT  Date of Birth/Age: 1955 / 69 years Fellow:  Gender:            F                   Fellow:  Encounter#:        [id number]       Study: IVC - Inferior Vena Cava Filter       Procedure Description:  After infiltration with 2% Lidocaine, the was cannulated with a modified Seldinger technique. After infiltration of local anesthetic, the right femoral vein was cannulated with a percutaneous technique. A 7 Japanese sheath was placed in the vein. Post-procedure, the venous sheath was pulled and pressure was applied to the site.     Peripheral Interventions:  IVC filter was placed under floroscopy guidance after vennogram ws done no complication.     Hemo Personnel:  +---------------+---------+  Name           Duty        +---------------+---------+  Sidra Valles MD 1  +---------------+---------+       Cardiac Cath Post Procedure Notes:  Post Procedure Diagnosis: IVC filter placement.  Blood Loss:               Estimated blood loss during the procedure was 2 mls.  Specimens Removed:        Number of specimen(s) removed: none.    ____________________________________________________________________________________  CONCLUSIONS:   1. Successful placement of IVC filter.    ICD 10 Codes:  Personal history of thrombophlebitis-Z86.72     CPT Codes:  Venogram, IVC-42745; Insertion IVC filter-09025; Moderate Sedation Services 1st additional 15 minutes patient >5 years-12439; Moderate Sedation Services 2nd additional 15 minutes patient >5 years-36955     [id number] Sidra Valles MD  Performing Physician  Electronically signed by Adolfo Valles MD on 6/25/2025 at 3:41:22 PM    ** Final **              [1] [Held by provider] enoxaparin, 40 mg, subcutaneous, q24h  [START ON 6/27/2025] methylPREDNISolone sodium succinate (PF), 40 mg, intravenous, q24h  pantoprazole, 40 mg, intravenous, Daily     [2] PRN medications: HYDROmorphone, ondansetron ODT **OR** ondansetron  [3]

## 2025-06-26 NOTE — ANESTHESIA POSTPROCEDURE EVALUATION
Patient: Leola Jennings    Procedure Summary       Date: 06/26/25 Room / Location: [facility]    Anesthesia Start: 1729 Anesthesia Stop:     Procedure: FLEXIBLE SIGMOIDOSCOPY Diagnosis:       Ulcerative colitis with complication, unspecified location (Multi)      Abnormal findings on diagnostic imaging of other parts of digestive tract    Scheduled Providers: Viri Elizalde MD; Antonio Bee, HUSSEIN Responsible Provider: Lino Eng DO    Anesthesia Type: MAC ASA Status: 3            Anesthesia Type: MAC    Vitals Value Taken Time   /71 06/26/25 17:44   Temp 36.5 06/26/25 17:44   Pulse 88 06/26/25 17:44   Resp 18 06/26/25 17:44   SpO2 99 06/26/25 17:44       Anesthesia Post Evaluation    Patient location during evaluation: bedside  Patient participation: complete - patient participated  Level of consciousness: awake and alert  Pain score: 0  Pain management: adequate  Airway patency: patent  Cardiovascular status: acceptable and stable  Respiratory status: acceptable and room air  Hydration status: acceptable  Postoperative Nausea and Vomiting: none        No notable events documented.

## 2025-06-26 NOTE — PROGRESS NOTES
Findings reviewed with Dr. Humphreys. No indication for duplex at this time. Pt is stable to discharge from vascular perspective. Plan for follow up with Dr. Humphreys in 4-6 weeks.      Leola Jennings is a 69 y.o. female on day 2 of admission presenting with Blood loss anemia.    Subjective   Yesterday, pt underwent placement of IVC filter under the care of Dr. Valles. She tolerated the procedure well and returned to floor in hemodynamically stable condition.     This morning she is afebrile and hemodynamically stable. She reports severe pain at right groin puncture site. The site is soft without concern for hematoma. Distal pulses easily palpable. Will obtain duplex to rule out pseudoaneurysm. Pt continues to have bloody diarrhea. Is NPO for sigmoidoscopy today. There were no acute overnight.       Objective     Physical Exam  Vitals and nursing note reviewed.   Constitutional:       Appearance: Normal appearance. She is normal weight.   HENT:      Head: Normocephalic and atraumatic.      Right Ear: External ear normal.      Left Ear: External ear normal.      Nose: Nose normal.      Mouth/Throat:      Mouth: Mucous membranes are moist.      Pharynx: Oropharynx is clear.   Eyes:      Extraocular Movements: Extraocular movements intact.      Pupils: Pupils are equal, round, and reactive to light.   Cardiovascular:      Rate and Rhythm: Normal rate and regular rhythm.      Pulses: Normal pulses.      Comments: R groin access site soft. DP pulse easily palpable.  Pulmonary:      Effort: Pulmonary effort is normal.      Breath sounds: Normal breath sounds.   Abdominal:      General: Bowel sounds are normal. There is no distension.      Palpations: Abdomen is soft.      Tenderness: There is abdominal tenderness.   Musculoskeletal:         General: Normal range of motion.      Cervical back: Normal range of motion.      Right lower leg: No edema.      Left lower leg: No edema.   Skin:     General: Skin is warm and dry.    Neurological:      General: No focal deficit present.      Mental Status: She is alert and oriented to person, place, and time.   Psychiatric:         Mood and Affect: Mood normal.         Behavior: Behavior normal.      Comments: anxious         Last Recorded Vitals  Blood pressure 142/74, pulse 81, temperature 36.7 °C (98.1 °F), temperature source Temporal, resp. rate 16, height 1.524 m (5'), weight 61 kg (134 lb 8 oz), SpO2 98%.  Intake/Output last 3 Shifts:  I/O last 3 completed shifts:  In: 3146.5 (51.6 mL/kg) [P.O.:900; I.V.:1416.7 (23.2 mL/kg); Blood:629.8; IV Piggyback:200]  Out: 355 (5.8 mL/kg) [Urine:350 (0.2 mL/kg/hr); Blood:5]  Weight: 61 kg     Relevant Results  Scheduled medications  Scheduled Medications[1]  Continuous medications  Continuous Medications[2]  PRN medications  PRN Medications[3]    Electrocardiogram 12 Lead  Result Date: 6/25/2025  Normal sinus rhythm Normal ECG When compared with ECG of 24-JUN-2025 14:14, (unconfirmed) Nonspecific T wave abnormality no longer evident in Anterior leads Confirmed by Jeane Monreal ([id number]) on 6/25/2025 5:12:16 PM    Invasive vascular procedure  Result Date: 6/25/2025   [facility], Cath Lab        [address] [place] [address] Cardiovascular Catheterization Report Patient Name:      MERVIN DENNEY   Performing           [id number] Valles                                        Physician:           MD Study Date:        6/25/2025           Verifying Physician: Adolfo Valles MD MRN/PID:           [id number]            Cardiologist: Accession#:        [id number]        Ordering Provider:   [id number] ASHLEY COLBERT Date of Birth/Age: 1955 / 69 years Fellow: Gender:            F                   Fellow: Encounter#:        [id number]  Study: IVC - Inferior Vena Cava Filter  Procedure Description: After infiltration  with 2% Lidocaine, the was cannulated with a modified Seldinger technique. After infiltration of local anesthetic, the right femoral vein was cannulated with a percutaneous technique. A 7 Arabic sheath was placed in the vein. Post-procedure, the venous sheath was pulled and pressure was applied to the site.  Peripheral Interventions: IVC filter was placed under floroscopy guidance after vennogram ws done no complication.  Hemo Personnel: +---------------+---------+ Name           Duty      +---------------+---------+ Sidra Valles MD 1 +---------------+---------+  Cardiac Cath Post Procedure Notes: Post Procedure Diagnosis: IVC filter placement. Blood Loss:               Estimated blood loss during the procedure was 2 mls. Specimens Removed:        Number of specimen(s) removed: none. ____________________________________________________________________________________ CONCLUSIONS:  1. Successful placement of IVC filter. ICD 10 Codes: Personal history of thrombophlebitis-Z86.72  CPT Codes: Venogram, IVC-13182; Insertion IVC filter-41999; Moderate Sedation Services 1st additional 15 minutes patient >5 years-[id number]; Moderate Sedation Services 2nd additional 15 minutes patient >5 years-08833  [id number] Sidra Valles MD Performing Physician Electronically signed by Adolfo Valles MD on 6/25/2025 at 3:41:22 PM ** Final **     Vascular US lower extremity venous duplex bilateral  Result Date: 6/24/2025  STUDY: Bilateral Lower Extremity Venous Doppler Ultrasound; 6/24/2025 5:47 PM INDICATION: Thrombus seen in IVC and right iliac vein on CT. COMPARISON: None available. ACCESSION NUMBER(S): [id number] ORDERING CLINICIAN: PARDEEP GUTIERREZ TECHNIQUE:  Real-time grayscale imaging, color Doppler flow imaging, and spectral Doppler imaging of the bilateral lower extremity veins was performed. FINDINGS: There is chronic occlusive DVT demonstrated within the bilateral femoral and left peroneal veins.  There is chronic  nonocclusive DVT within the bilateral popliteal veins. The bilateral common femoral and profunda femoral veins demonstrated normal compressibility, normal phasic venous flow and normal response to augmentation.  There is no evidence for echogenic thrombi.      Evidence for chronic occlusive DVT within the bilateral femoral and left peroneal veins and chronic nonocclusive DVT within the bilateral popliteal veins. Signed by Ken Hampton MD    CT abdomen pelvis w IV contrast  Result Date: 6/24/2025  Interpreted By:  Emeterio Oh, STUDY: CT ABDOMEN PELVIS W IV CONTRAST;  6/24/2025 2:53 pm   INDICATION: 70 y/o   F with  Signs/Symptoms:ABD pain, bloody diarrhea.     LIMITATIONS: None.   ACCESSION NUMBER(S): [id number]   ORDERING CLINICIAN: PARDEEP GUTIERREZ   TECHNIQUE: After the administration of     IV nonionic contrast, spiral axial images were obtained from the xiphoid down through the symphysis pubis. Sagittal and coronal reconstruction images were generated. Bone, mediastinal, lung, and liver windows were reviewed. Omnipaque 350   75 ML   COMPARISON: Most recent prior is an unenhanced exam from 06/20/2025..   FINDINGS: LUNG BASES: No mass or pneumonia or pleural effusion in either lung base.. Mild stable bronchiectasis in the posteromedial lung bases, right greater than left.   LIVER: No hepatomegaly. Liver density was  within the limits of normal. No  liver lesion evident in this  exam.   GALLBLADDER: No calcified stone, gallbladder wall thickening, or adjacent edema.   BILE DUCTS: No intrahepatic biliary ductal dilatation.  Common bile duct was within the limits of normal.   SPLEEN: No splenomegaly. No splenic mass..   PANCREAS: No pancreatic mass or inflammation, or ductal dilatation.   KIDNEYS/ADRENALS: No adrenal mass or enlargement. No calcified stone, hydronephrosis, mass, or perinephric edema in either kidney. No ureteral stone or dilatation.   BLADDER/PELVIS: Urinary bladder was grossly intact. No uterine  enlargement or gross adnexal mass.   GREAT VESSELS/RETROPERITONEUM: There is central lucent filling defect consistent with venous thrombosis that extends from the mid to distal right external iliac vein on image 102 cephalad through the right common iliac vein and then cephalad in the inferior vena cava, ending approximately 2.2 cm below the entrance of the right renal vein into the inferior vena cava. There is no similar filling defect on the left. There are mural calcifications in the abdominal aorta down through the iliac arteries. No abdominal aortic aneurysm or gross dissection. Very mild nonspecific and grossly stable right and left periaortic retroperitoneal adenopathy measuring 10 mm short axis or less. No suspicious mesenteric adenopathy. No suspicious pelvic or inguinal adenopathy.   PERITONEUM: No ascites. No pneumoperitoneum. No peritoneal or mesenteric mass or inflammation.   BOWEL: The stomach was  grossly intact. There was no small bowel dilatation or small bowel wall thickening. No small-bowel obstruction. There is mild hypodense colonic wall thickening beginning in the region of the hepatic flexure and extending through the transverse colon, down the left colon, and through the rectum. There is also mild prominence of the mesenteric vessels  adjacent to the thickened large bowel loops. There is subtle mesenteric edema adjacent to portions of the left colon and rectum. There is no extraluminal gas. No bowel obstruction. No air-fluid collection. The cecal appendix was intact.   BONES: No destructive lytic or blastic bone lesion. Stable grade 1 retrolisthesis of L4 over L5. Mild disc space narrowing with endplate spurring at L2-3 and L4-5. Interfacet hypertrophic arthritic changes at L4-5 and L5-S1. Mild sclerotic arthritic changes in both SI joints.   ABDOMINAL WALL: Tiny fat containing umbilical hernia..       Acute nonocclusive deep venous thrombosis involving the right external iliac vein, right  common iliac vein, and the infrarenal IVC. Please see above for details.   Nonspecific mild colitis extending from the hepatic flexure down through the rectum. Differential considerations include inflammatory bowel disease (ulcer colitis), infectious colitis, and ischemic colitis. Suggest direct visualization with colonoscopy. Currently without CT evidence of bowel obstruction or perforation.   Arthritic changes in the lumbosacral spine as described.   Grossly stable mild bronchiectasis at the posteromedial lung bases, right greater than left.   MACRO: Emeterio Oh discussed the significance and urgency of this critical finding by EPIC secure chat with  PARDEEP GUTIERREZ on 6/24/2025 at 3:14 pm.  (**-RCF-**) Findings:  See findings.   Signed by: Emeterio Oh 6/24/2025 3:14 PM Dictation workstation:   [id number]    ECG 12 lead  Result Date: 6/24/2025  Normal sinus rhythm Possible Left atrial enlargement Nonspecific T wave abnormality Prolonged QT Abnormal ECG When compared with ECG of 12-APR-2018 12:47, Questionable change in QRS axis Nonspecific T wave abnormality now evident in Anterior leads    Results for orders placed or performed during the hospital encounter of 06/24/25 (from the past 24 hours)   SST TOP   Result Value Ref Range    Extra Tube Hold for add-ons.    SST TOP   Result Value Ref Range    Extra Tube Hold for add-ons.    Electrocardiogram 12 Lead   Result Value Ref Range    Ventricular Rate 87 BPM    Atrial Rate 87 BPM    MO Interval 172 ms    QRS Duration 88 ms    QT Interval 396 ms    QTC Calculation(Bazett) 476 ms    P Axis 82 degrees    R Axis 54 degrees    T Axis 65 degrees    QRS Count 15 beats    Q Onset 215 ms    P Onset 129 ms    P Offset 183 ms    T Offset 413 ms    QTC Fredericia 448 ms   SST TOP   Result Value Ref Range    Extra Tube Hold for add-ons.    CBC and Auto Differential   Result Value Ref Range    WBC 13.9 (H) 4.4 - 11.3 x10*3/uL    nRBC 0.1 (H) 0.0 - 0.0 /100 WBCs    RBC 3.79 (L)  4.00 - 5.20 x10*6/uL    Hemoglobin 9.4 (L) 12.0 - 16.0 g/dL    Hematocrit 30.3 (L) 36.0 - 46.0 %    MCV 80 80 - 100 fL    MCH 24.8 (L) 26.0 - 34.0 pg    MCHC 31.0 (L) 32.0 - 36.0 g/dL    RDW 18.6 (H) 11.5 - 14.5 %    Platelets 360 150 - 450 x10*3/uL    Neutrophils % 82.1 40.0 - 80.0 %    Immature Granulocytes %, Automated 0.7 0.0 - 0.9 %    Lymphocytes % 12.4 13.0 - 44.0 %    Monocytes % 4.7 2.0 - 10.0 %    Eosinophils % 0.0 0.0 - 6.0 %    Basophils % 0.1 0.0 - 2.0 %    Neutrophils Absolute 11.44 (H) 1.20 - 7.70 x10*3/uL    Immature Granulocytes Absolute, Automated 0.10 0.00 - 0.70 x10*3/uL    Lymphocytes Absolute 1.73 1.20 - 4.80 x10*3/uL    Monocytes Absolute 0.65 0.10 - 1.00 x10*3/uL    Eosinophils Absolute 0.00 0.00 - 0.70 x10*3/uL    Basophils Absolute 0.02 0.00 - 0.10 x10*3/uL   Comprehensive Metabolic Panel   Result Value Ref Range    Glucose 123 (H) 74 - 99 mg/dL    Sodium 140 136 - 145 mmol/L    Potassium 4.1 3.5 - 5.3 mmol/L    Chloride 112 (H) 98 - 107 mmol/L    Bicarbonate 22 21 - 32 mmol/L    Anion Gap 10 10 - 20 mmol/L    Urea Nitrogen 9 6 - 23 mg/dL    Creatinine 0.56 0.50 - 1.05 mg/dL    eGFR >90 >60 mL/min/1.73m*2    Calcium 8.0 (L) 8.6 - 10.3 mg/dL    Albumin 2.6 (L) 3.4 - 5.0 g/dL    Alkaline Phosphatase 72 33 - 136 U/L    Total Protein 6.0 (L) 6.4 - 8.2 g/dL    AST 11 9 - 39 U/L    Bilirubin, Total 0.4 0.0 - 1.2 mg/dL    ALT 8 7 - 45 U/L           Assessment & Plan  Blood loss anemia    IVC thrombosis (Multi)    Deep vein thrombosis  Incidental finding of non-occlusive thrombus to infrarenal IVC and R external and common iliac veins. Venous duplex revealed chronic appearing occlusive thrombus to bilateral femoral veins. Unable to initiate OAC due to GI bleeding.   S/p IVC filter placement  -Discussed with Dr. Humphreys   -c/o pain at the access site. No evidence of hematoma on exam. Will obtain duplex to rule out pseudoaneurysm    GI bleeding; Ulcerative Colits  Hx of ulcerative colitis, previously  on biologics but of recent has been managed with steroid tapers.   Hgb 6.5 on presentation. FOBT +. Had recurrent episode of bloody stool following Lovenox administration (prophylactic dose).   6/25: Hgb corrected to 9.9 following 2 units PRBC's.   6/26: Continues to report bloody diarrhea. Hgb stable at 9.4  -close monitoring for bleeding  -follow H&H and transfuse for Hgb<7  -GI following  -for sigmoidoscopy today      I spent 30 minutes in the professional and overall care of this patient.      Melinda Cadet, APRN-CNP         [1] [Held by provider] enoxaparin, 40 mg, subcutaneous, q24h  methylPREDNISolone sodium succinate (PF), 40 mg, intravenous, q12h  pantoprazole, 40 mg, intravenous, Daily  [2]    [3] PRN medications: HYDROmorphone, ondansetron ODT **OR** ondansetron

## 2025-06-26 NOTE — PROGRESS NOTES
Physical Therapy                 Therapy Communication Note    Patient Name: Leola Jennings  MRN: [id number]  Department: [facility]  Room: [facility]1103-  Today's Date: 6/26/2025     Discipline: Physical Therapy    Missed Visit: PT Missed Visit: Yes     Missed Visit Reason: Missed Visit Reason: Patient placed on medical hold    Missed Time: Attempt    Comment:  Received order for P.T. eval. Chart reviewed. Attempted to see Pt. for P.T. eval but Pt. is scheduled for flex sigmoidoscopy today and Pt.'s nurse requested that therapy wait until after Pt.'s procedure to attempt therapy eval.  Will re-attempt P.T. eval when Pt. is medically appropriate and available.

## 2025-06-26 NOTE — TELEPHONE ENCOUNTER
I have attempted to contact   Mrs. Jennings  . There is no answer at the following phone number   [phone] I have left a voice mail message for the patient to contact Dr. Humphreys's  office nurse at [phone] to schedule a follow up appointment.  Niharika Farias RN BSN

## 2025-06-27 LAB
ALBUMIN SERPL BCP-MCNC: 2.7 G/DL (ref 3.4–5)
ALP SERPL-CCNC: 76 U/L (ref 33–136)
ALT SERPL W P-5'-P-CCNC: 10 U/L (ref 7–45)
ANION GAP SERPL CALC-SCNC: 14 MMOL/L (ref 10–20)
AST SERPL W P-5'-P-CCNC: 12 U/L (ref 9–39)
BASOPHILS # BLD AUTO: 0.02 X10*3/UL (ref 0–0.1)
BASOPHILS NFR BLD AUTO: 0.2 %
BILIRUB SERPL-MCNC: 0.5 MG/DL (ref 0–1.2)
BUN SERPL-MCNC: 12 MG/DL (ref 6–23)
C COLI+JEJ+UPSA DNA STL QL NAA+PROBE: NOT DETECTED
CALCIUM SERPL-MCNC: 7.9 MG/DL (ref 8.6–10.3)
CHLORIDE SERPL-SCNC: 108 MMOL/L (ref 98–107)
CO2 SERPL-SCNC: 21 MMOL/L (ref 21–32)
CREAT SERPL-MCNC: 0.65 MG/DL (ref 0.5–1.05)
EC STX1 GENE STL QL NAA+PROBE: NOT DETECTED
EC STX2 GENE STL QL NAA+PROBE: NOT DETECTED
EGFRCR SERPLBLD CKD-EPI 2021: >90 ML/MIN/1.73M*2
EOSINOPHIL # BLD AUTO: 0.01 X10*3/UL (ref 0–0.7)
EOSINOPHIL NFR BLD AUTO: 0.1 %
ERYTHROCYTE [DISTWIDTH] IN BLOOD BY AUTOMATED COUNT: 19.4 % (ref 11.5–14.5)
GLUCOSE SERPL-MCNC: 87 MG/DL (ref 74–99)
HCT VFR BLD AUTO: 31.6 % (ref 36–46)
HGB BLD-MCNC: 9.8 G/DL (ref 12–16)
HOLD SPECIMEN: NORMAL
IMM GRANULOCYTES # BLD AUTO: 0.11 X10*3/UL (ref 0–0.7)
IMM GRANULOCYTES NFR BLD AUTO: 1 % (ref 0–0.9)
LYMPHOCYTES # BLD AUTO: 3.16 X10*3/UL (ref 1.2–4.8)
LYMPHOCYTES NFR BLD AUTO: 29.6 %
MCH RBC QN AUTO: 24.4 PG (ref 26–34)
MCHC RBC AUTO-ENTMCNC: 31 G/DL (ref 32–36)
MCV RBC AUTO: 79 FL (ref 80–100)
MONOCYTES # BLD AUTO: 1.05 X10*3/UL (ref 0.1–1)
MONOCYTES NFR BLD AUTO: 9.8 %
NEUTROPHILS # BLD AUTO: 6.33 X10*3/UL (ref 1.2–7.7)
NEUTROPHILS NFR BLD AUTO: 59.3 %
NIL(NEG) CONTROL SPOT COUNT: NORMAL
NOROVIRUS GI + GII RNA STL NAA+PROBE: NOT DETECTED
NRBC BLD-RTO: 0.2 /100 WBCS (ref 0–0)
PANEL A SPOT COUNT: 0
PANEL B SPOT COUNT: 0
PLATELET # BLD AUTO: 336 X10*3/UL (ref 150–450)
POS CONTROL SPOT COUNT: NORMAL
POTASSIUM SERPL-SCNC: 3.3 MMOL/L (ref 3.5–5.3)
PROT SERPL-MCNC: 6.3 G/DL (ref 6.4–8.2)
RBC # BLD AUTO: 4.01 X10*6/UL (ref 4–5.2)
RV RNA STL NAA+PROBE: NOT DETECTED
SALMONELLA DNA STL QL NAA+PROBE: NOT DETECTED
SHIGELLA DNA SPEC QL NAA+PROBE: NOT DETECTED
SODIUM SERPL-SCNC: 140 MMOL/L (ref 136–145)
T-SPOT. TB INTERPRETATION: NEGATIVE
V CHOLERAE DNA STL QL NAA+PROBE: NOT DETECTED
WBC # BLD AUTO: 10.7 X10*3/UL (ref 4.4–11.3)
Y ENTEROCOL DNA STL QL NAA+PROBE: NOT DETECTED

## 2025-06-27 PROCEDURE — 99232 SBSQ HOSP IP/OBS MODERATE 35: CPT | Performed by: INTERNAL MEDICINE

## 2025-06-27 PROCEDURE — 1200000002 HC GENERAL ROOM WITH TELEMETRY DAILY

## 2025-06-27 PROCEDURE — 2500000002 HC RX 250 W HCPCS SELF ADMINISTERED DRUGS (ALT 637 FOR MEDICARE OP, ALT 636 FOR OP/ED): Performed by: INTERNAL MEDICINE

## 2025-06-27 PROCEDURE — 97161 PT EVAL LOW COMPLEX 20 MIN: CPT | Mod: GP

## 2025-06-27 PROCEDURE — 2500000004 HC RX 250 GENERAL PHARMACY W/ HCPCS (ALT 636 FOR OP/ED): Performed by: INTERNAL MEDICINE

## 2025-06-27 PROCEDURE — 36415 COLL VENOUS BLD VENIPUNCTURE: CPT | Performed by: INTERNAL MEDICINE

## 2025-06-27 PROCEDURE — 80053 COMPREHEN METABOLIC PANEL: CPT | Performed by: INTERNAL MEDICINE

## 2025-06-27 PROCEDURE — 85025 COMPLETE CBC W/AUTO DIFF WBC: CPT | Performed by: INTERNAL MEDICINE

## 2025-06-27 PROCEDURE — 97165 OT EVAL LOW COMPLEX 30 MIN: CPT | Mod: GO

## 2025-06-27 PROCEDURE — 2500000001 HC RX 250 WO HCPCS SELF ADMINISTERED DRUGS (ALT 637 FOR MEDICARE OP): Performed by: INTERNAL MEDICINE

## 2025-06-27 RX ORDER — POTASSIUM CHLORIDE 20 MEQ/1
40 TABLET, EXTENDED RELEASE ORAL ONCE
Status: COMPLETED | OUTPATIENT
Start: 2025-06-27 | End: 2025-06-27

## 2025-06-27 RX ORDER — OXYCODONE HYDROCHLORIDE 5 MG/1
5 TABLET ORAL EVERY 4 HOURS PRN
Status: DISCONTINUED | OUTPATIENT
Start: 2025-06-27 | End: 2025-06-30

## 2025-06-27 RX ADMIN — PANTOPRAZOLE SODIUM 40 MG: 40 INJECTION, POWDER, LYOPHILIZED, FOR SOLUTION INTRAVENOUS at 10:29

## 2025-06-27 RX ADMIN — HYDROMORPHONE HYDROCHLORIDE 0.4 MG: 1 INJECTION, SOLUTION INTRAMUSCULAR; INTRAVENOUS; SUBCUTANEOUS at 06:33

## 2025-06-27 RX ADMIN — HYDROMORPHONE HYDROCHLORIDE 0.4 MG: 1 INJECTION, SOLUTION INTRAMUSCULAR; INTRAVENOUS; SUBCUTANEOUS at 13:39

## 2025-06-27 RX ADMIN — POTASSIUM CHLORIDE 40 MEQ: 1500 TABLET, EXTENDED RELEASE ORAL at 10:29

## 2025-06-27 RX ADMIN — METHYLPREDNISOLONE SODIUM SUCCINATE 40 MG: 40 INJECTION, POWDER, FOR SOLUTION INTRAMUSCULAR; INTRAVENOUS at 10:28

## 2025-06-27 RX ADMIN — OXYCODONE HYDROCHLORIDE 5 MG: 5 TABLET ORAL at 18:23

## 2025-06-27 ASSESSMENT — COGNITIVE AND FUNCTIONAL STATUS - GENERAL
DRESSING REGULAR UPPER BODY CLOTHING: A LITTLE
TURNING FROM BACK TO SIDE WHILE IN FLAT BAD: A LOT
WALKING IN HOSPITAL ROOM: A LOT
MOBILITY SCORE: 8
MOVING TO AND FROM BED TO CHAIR: TOTAL
DRESSING REGULAR UPPER BODY CLOTHING: A LOT
EATING MEALS: A LITTLE
DAILY ACTIVITIY SCORE: 14
CLIMB 3 TO 5 STEPS WITH RAILING: TOTAL
DRESSING REGULAR LOWER BODY CLOTHING: TOTAL
MOVING FROM LYING ON BACK TO SITTING ON SIDE OF FLAT BED WITH BEDRAILS: A LOT
DAILY ACTIVITIY SCORE: 14
TOILETING: A LOT
PERSONAL GROOMING: A LITTLE
HELP NEEDED FOR BATHING: A LOT
CLIMB 3 TO 5 STEPS WITH RAILING: TOTAL
TURNING FROM BACK TO SIDE WHILE IN FLAT BAD: A LITTLE
MOBILITY SCORE: 14
DRESSING REGULAR LOWER BODY CLOTHING: A LOT
HELP NEEDED FOR BATHING: A LOT
WALKING IN HOSPITAL ROOM: TOTAL
STANDING UP FROM CHAIR USING ARMS: A LOT
PERSONAL GROOMING: A LITTLE
TOILETING: TOTAL
MOVING FROM LYING ON BACK TO SITTING ON SIDE OF FLAT BED WITH BEDRAILS: A LITTLE
MOVING TO AND FROM BED TO CHAIR: A LITTLE
STANDING UP FROM CHAIR USING ARMS: TOTAL

## 2025-06-27 ASSESSMENT — PAIN SCALES - GENERAL
PAINLEVEL_OUTOF10: 2
PAINLEVEL_OUTOF10: 3
PAINLEVEL_OUTOF10: 2
PAINLEVEL_OUTOF10: 8

## 2025-06-27 ASSESSMENT — PAIN - FUNCTIONAL ASSESSMENT
PAIN_FUNCTIONAL_ASSESSMENT: 0-10

## 2025-06-27 ASSESSMENT — ACTIVITIES OF DAILY LIVING (ADL): BATHING_ASSISTANCE: MODERATE

## 2025-06-27 NOTE — PROGRESS NOTES
06/27/25 1518   Discharge Planning   Who is requesting discharge planning? Provider   Home or Post Acute Services Post acute facilities (Rehab/SNF/etc)   Type of Post Acute Facility Services Skilled nursing   Expected Discharge Disposition SNF   Does the patient need discharge transport arranged? Yes   RoundTrip coordination needed? Yes   Has discharge transport been arranged? No   Patient Choice   Provider Choice list and CMS website (https://medicare.gov/care-compare#search) for post-acute Quality and Resource Measure Data were provided and reviewed with: Patient   Patient / Family choosing to utilize agency / facility established prior to hospitalization Yes   Intensity of Service   Intensity of Service 0-30 min     Met with pt to discuss SNF placement. [facility] PT 8 OT 14. Pt is agreement with need for SNF. Provided pt SNF list from [facility]. Pt to discuss with her brother, SNF choice.

## 2025-06-27 NOTE — PROGRESS NOTES
Occupational Therapy    Evaluation    Patient Name: Leola Jennings  MRN: [id number]  Department: [facility]  Room: [facility]1103-A  Today's Date: 6/27/2025  Time Calculation  Start Time: 0943  Stop Time: 1004  Time Calculation (min): 21 min        Assessment:  OT Assessment: Pt. presents with impaired balance, strength, safety, and independence with transfers and ADLs. Requires dependent assist for transfers at this time. Would benefit from continued OT to address deficits.  Prognosis: Good  Barriers to Discharge Home: Caregiver assistance, Physical needs  Caregiver Assistance: Patient lives alone and/or does not have reliable caregiver assistance  Physical Needs: 24hr mobility assistance needed, 24hr ADL assistance needed, High falls risk due to function or environment  End of Session Communication: Bedside nurse, Care Coordinator  End of Session Patient Position: Bed, 3 rail up, Alarm on  OT Assessment Results: Decreased ADL status, Decreased safe judgment during ADL, Decreased endurance, Decreased functional mobility, Decreased IADLs, Decreased trunk control for functional activities  Prognosis: Good  Plan:  Treatment Interventions: ADL retraining, Functional transfer training, Endurance training, Compensatory technique education  OT Frequency: 3 times per week  OT Discharge Recommendations: High intensity level of continued care  OT Recommended Transfer Status: Maximum assist, Assist of 2, Dependent  OT - OK to Discharge: Yes (OT evaluation complete. Refer to MD for discharge.)    Subjective   Current Problem:  1. Deep vein thrombosis (DVT) of proximal lower extremity, unspecified chronicity, unspecified laterality  Surgical Pathology Exam    Surgical Pathology Exam      2. Blood loss anemia  Case Request Cath Lab: IVC Filter Insertion    Case Request Cath Lab: IVC Filter Insertion    Invasive vascular procedure    Invasive vascular procedure    Surgical Pathology Exam    Surgical Pathology Exam      3. Colitis   Surgical Pathology Exam    Surgical Pathology Exam      4. Iliac vein thrombosis, unspecified laterality (Multi)  Surgical Pathology Exam    Surgical Pathology Exam      5. Hypokalemia  Surgical Pathology Exam    Surgical Pathology Exam      6. IVC thrombosis (Multi)  Case Request Cath Lab: IVC Filter Insertion    Case Request Cath Lab: IVC Filter Insertion    Invasive vascular procedure    Invasive vascular procedure    Surgical Pathology Exam    Surgical Pathology Exam      7. Ulcerative colitis with complication, unspecified location (Multi)  Flexible Sigmoidoscopy    Flexible Sigmoidoscopy    Surgical Pathology Exam    Surgical Pathology Exam      8. Abnormal findings on diagnostic imaging of other parts of digestive tract  Flexible Sigmoidoscopy    Flexible Sigmoidoscopy    Surgical Pathology Exam    Surgical Pathology Exam      9. Personal history of thrombophlebitis  Invasive vascular procedure    Surgical Pathology Exam    Surgical Pathology Exam      10. Acute postoperative pain of groin, right  Surgical Pathology Exam    Surgical Pathology Exam    CANCELED: Vascular US lower extremity pseudoaneurysm duplex right    CANCELED: Vascular US lower extremity pseudoaneurysm duplex right      11. Postprocedural hematoma of a circulatory system organ or structure following other procedure  Surgical Pathology Exam    Surgical Pathology Exam    CANCELED: Vascular US lower extremity pseudoaneurysm duplex right    CANCELED: Vascular US lower extremity pseudoaneurysm duplex right        OT Visit Info:  OT Received On: 06/27/25  General:  General  Reason for Referral: ADl impairment  Referred By: Micaela OT/PT 6/25  Past Medical History Relevant to Rehab: celiacs, ulcerative colitis, RA, HLD, HTN  Family/Caregiver Present: No  Co-Treatment: PT  Co-Treatment Reason: to maximize pt. safety  Prior to Session Communication: Bedside nurse  Patient Position Received: Bed, 3 rail up, Alarm on  General Comment: Pt. is 70 y/o  female to ED with abominal pain, bloody diarrhea x1 month, and weakness/dizziness. dx: blood loss, colitis, illiac vein thrombosis, hypokalemia. CT abd: (+) acute nonocclusive DVT R external illiac vein, R common illiac vein, mild colitis. Vascular US LE: chronic occlusive DVT bilateral femoral and L peroneal veins, and bilateral popiteal veins. IVC filter placed 6/25 by Dr. Humphreys . Underwent flexible sigmoidoscopy 6/26. Pt. was anemic on admit, hgb 6.5, given 2units PRBCs, hgb 9.8  6/27.  Precautions:  Medical Precautions: Fall precautions, Infection precautions (contact plus for mulitple r/o in stool)    Pain:  Pain Assessment  Pain Assessment: 0-10  0-10 (Numeric) Pain Score: 2  Pain Type: Acute pain  Pain Location: Abdomen  Pain Interventions: Repositioned  Response to Interventions: No change in pain    Objective   Cognition:  Overall Cognitive Status: Within Functional Limits  Orientation Level: Oriented X4     Home Living:  Home Living Comments: Pt. states that she lives alone in ground level apartment. No stairs. Has tub shower, no DME.  Prior Function:  Prior Function Comments: Pt. states she is very active, has activities daily. No AD use. Drives. No falls. Independent with all ADLs/IADLs.    ADL:  Eating Assistance: Independent  Grooming Assistance: Minimal  Bathing Assistance: Moderate  UE Dressing Assistance: Minimal  LE Dressing Assistance: Total  Toileting Assistance with Device: Total  Activity Tolerance:  Endurance: Decreased tolerance for upright activites  Activity Tolerance Comments: weakness limiting participation and transfers  Bed Mobility/Transfers: Bed Mobility  Bed Mobility:  (sup to sit, Max A to bring LEs and trunk to upright sit. Pt. demonstrates rigidity and stiffness EOB. Pt. uses UEs to lift and bring RLE into bed and rolls her body to the R in order to slide LLE into bed. able to brige while supine)    Transfers  Transfer:  (dependent sit to stand and pivot transfer bed<>BSC. Pt.  requesting to show therapists how much she can do to get up from BSC ; VC for hand placement, pt. unable to clear buttocks from BSC. Dependent to pivot back to bed. No AD use.)      Functional Mobility:  Functional Mobility  Functional Mobility Performed: No  Sitting Balance:  Static Sitting Balance  Static Sitting-Level of Assistance: Contact guard  Standing Balance:  Static Standing Balance  Static Standing-Level of Assistance: Dependent  Dynamic Standing Balance  Dynamic Standing-Comments: unable to demonstrate for functional activites     Strength:  Strength Comments: BUEs 4-/5 grossly    Coordination:  Coordination Comment: WFL   Hand Function:  Gross Grasp: Functional  Extremities: RUE   RUE : Within Functional Limits and LUE   LUE: Within Functional Limits    Outcome Measures:[facility] Daily Activity  Putting on and taking off regular lower body clothing: Total  Bathing (including washing, rinsing, drying): A lot  Putting on and taking off regular upper body clothing: A little  Toileting, which includes using toilet, bedpan or urinal: Total  Taking care of personal grooming such as brushing teeth: A little  Eating Meals: None  Daily Activity - Total Score: 14    Education Documentation  Body Mechanics, taught by Swapna Nelson OT at 6/27/2025 11:46 AM.  Learner: Patient  Readiness: Acceptance  Method: Explanation  Response: Verbalizes Understanding, Needs Reinforcement    ADL Training, taught by Swapna Nelson OT at 6/27/2025 11:46 AM.  Learner: Patient  Readiness: Acceptance  Method: Explanation  Response: Verbalizes Understanding, Needs Reinforcement    IP EDUCATION:  Education  Individual(s) Educated: Patient  Education Provided: Fall precautons, Risk and benefits of OT discussed with patient or other, POC discussed and agreed upon    Goals:  Encounter Problems       Encounter Problems (Active)       OT Goals       Good dyn sitting balance EOB for ADL participation (Progressing)        Start:  06/27/25    Expected End:  07/11/25            Min A for all functional transfers  (Progressing)       Start:  06/27/25    Expected End:  07/11/25            Min A for LB dressing  (Progressing)       Start:  06/27/25    Expected End:  07/11/25            Min A for toileting tasks and clothing mgmt  (Progressing)       Start:  06/27/25    Expected End:  07/11/25            Pt. will tolerate static/dynamic standing x2 minutes to progress strength and endurance for ADLs and transfers (Progressing)       Start:  06/27/25    Expected End:  07/11/25

## 2025-06-27 NOTE — PROGRESS NOTES
Physical Therapy    Physical Therapy Evaluation    Patient Name: Leola Jennings  MRN: [id number]  Today's Date: 6/27/2025   Time Calculation  Start Time: 0942  Stop Time: 1000  Time Calculation (min): 18 min  1103/1103-A    Assessment/Plan   PT Assessment  PT Assessment Results: Decreased strength, Decreased range of motion, Decreased endurance, Impaired balance, Decreased mobility, Decreased coordination, Pain  Rehab Prognosis: Fair  Barriers to Discharge Home: Caregiver assistance, Physical needs  Caregiver Assistance: Patient lives alone and/or does not have reliable caregiver assistance  Physical Needs: 24hr mobility assistance needed, 24hr ADL assistance needed  Evaluation/Treatment Tolerance: Patient limited by fatigue, Patient limited by pain  Strengths: Coping skills, Attitude of self, Premorbid level of function  Barriers to Participation: Comorbidities  End of Session Communication: Bedside nurse, Care Coordinator  Assessment Comment: pt with decreased mobility  strength balance endurance . pt to benefit from skilled PT to address deficits and improve functional mobility .  End of Session Patient Position: Bed, 3 rail up, Alarm on  IP OR SWING BED PT PLAN  Inpatient or Swing Bed: Inpatient  PT Plan  Treatment/Interventions: Bed mobility, Transfer training, Gait training, Stair training, Balance training, Strengthening, Endurance training, Therapeutic activity  PT Plan: Ongoing PT  PT Frequency: 4 times per week  PT Discharge Recommendations: Moderate intensity level of continued care, High intensity level of continued care  PT Recommended Transfer Status: Assist x1, Total assist  PT - OK to Discharge: Yes (once medically ready for discharge to next level of care.)    Subjective     Current Problem:  1. Deep vein thrombosis (DVT) of proximal lower extremity, unspecified chronicity, unspecified laterality  Surgical Pathology Exam    Surgical Pathology Exam      2. Blood loss anemia  Case Request Cath Lab:  IVC Filter Insertion    Case Request Cath Lab: IVC Filter Insertion    Invasive vascular procedure    Invasive vascular procedure    Surgical Pathology Exam    Surgical Pathology Exam      3. Colitis  Surgical Pathology Exam    Surgical Pathology Exam      4. Iliac vein thrombosis, unspecified laterality (Multi)  Surgical Pathology Exam    Surgical Pathology Exam      5. Hypokalemia  Surgical Pathology Exam    Surgical Pathology Exam      6. IVC thrombosis (Multi)  Case Request Cath Lab: IVC Filter Insertion    Case Request Cath Lab: IVC Filter Insertion    Invasive vascular procedure    Invasive vascular procedure    Surgical Pathology Exam    Surgical Pathology Exam      7. Ulcerative colitis with complication, unspecified location (Multi)  Flexible Sigmoidoscopy    Flexible Sigmoidoscopy    Surgical Pathology Exam    Surgical Pathology Exam      8. Abnormal findings on diagnostic imaging of other parts of digestive tract  Flexible Sigmoidoscopy    Flexible Sigmoidoscopy    Surgical Pathology Exam    Surgical Pathology Exam      9. Personal history of thrombophlebitis  Invasive vascular procedure    Surgical Pathology Exam    Surgical Pathology Exam      10. Acute postoperative pain of groin, right  Surgical Pathology Exam    Surgical Pathology Exam    CANCELED: Vascular US lower extremity pseudoaneurysm duplex right    CANCELED: Vascular US lower extremity pseudoaneurysm duplex right      11. Postprocedural hematoma of a circulatory system organ or structure following other procedure  Surgical Pathology Exam    Surgical Pathology Exam    CANCELED: Vascular US lower extremity pseudoaneurysm duplex right    CANCELED: Vascular US lower extremity pseudoaneurysm duplex right        General Visit Information:  General  Reason for Referral: weakness/ impaired mobility  Referred By: OT/PT  Micaela 6/25  Past Medical History Relevant to Rehab: HLD,Anemia, GIB,RA DVT IBS UC  Co-Treatment: OT  Co-Treatment Reason: to  maximize pt safety  Prior to Session Communication: Bedside nurse (cleared to see for therapy roseline)  Patient Position Received: Bed, 3 rail up, Alarm on  General Comment: pt is a 70 yo female came to the hospital on 6/24 with reports of  ABD pain and bloody stools.  dx  Anemia IVC thrombosis UC .  test/labs : R/O C diff ,  K+ 3.3, HGB: 9.8  pt had sigmoidoscopy on 6/26 , showed severe UC / colitis . CT ABD :  + DVT R LE .  mild colitis .  US B LEs : chronic DVTs bilat .  Home Living:  Home Living  Home Living Comments: Pt. states that she lives alone in ground level apartment. No stairs. Has tub shower, no DME.  Prior Level of Function:  Prior Function Per Pt/Caregiver Report  Prior Function Comments: Pt. states she is very active, has activities daily. No AD use. Drives. No falls. Independent with all ADLs/IADLs.  Precautions:  Precautions  Medical Precautions: Fall precautions, Infection precautions (contact plus for mulitple r/o in stool)  Objective   Pain:  Pain Assessment  Pain Assessment: 0-10  0-10 (Numeric) Pain Score: 2  Pain Type: Acute pain  Pain Location: Abdomen  Pain Interventions: Repositioned  Response to Interventions: No change in pain  Cognition:  Cognition  Overall Cognitive Status: Within Functional Limits  Orientation Level: Oriented X4  General Assessments:  Activity Tolerance  Endurance: Decreased tolerance for upright activites  Strength  Strength Comments: BLE 2+/5  Coordination  Coordination Comment: WFL  Static Sitting Balance  Static Sitting-Comment/Number of Minutes: fair -  Dynamic Sitting Balance  Dynamic Sitting-Comments: fair -  Static Standing Balance  Static Standing-Comment/Number of Minutes: poor  Dynamic Standing Balance  Dynamic Standing-Comments: poor  Functional Assessments:  Bed Mobility  Bed Mobility: Yes  Bed Mobility 1  Bed Mobility 1: Supine to sitting, Sitting to supine  Level of Assistance 1: Maximum assistance, Moderate verbal cues  Bed Mobility Comments 1: Max A in  and out of bed . asssitance needed with trunk and BLEs pt very rigid trunk when sitting EOB  Transfers  Transfer: Yes  Transfer 1  Technique 1: Sit to stand, Stand to sit, Stand pivot  Transfer Level of Assistance 1: Dependent  Trials/Comments 1: DEP x 1 transfers from bed to BSC and back .  no A/D  pt unable to stand without total assistance .  Ambulation/Gait Training  Ambulation/Gait Training Performed: No  Extremity/Trunk Assessments:  RUE   RUE : Within Functional Limits  LUE   LUE: Within Functional Limits  RLE   RLE : Within Functional Limits  LLE   LLE : Within Functional Limits    Outcome Measures:     [facility] Basic Mobility  Turning from your back to your side while in a flat bed without using bedrails: A lot  Moving from lying on your back to sitting on the side of a flat bed without using bedrails: A lot  Moving to and from bed to chair (including a wheelchair): Total  Standing up from a chair using your arms (e.g. wheelchair or bedside chair): Total  To walk in hospital room: Total  Climbing 3-5 steps with railing: Total  Basic Mobility - Total Score: 8  Goals:  Encounter Problems       Encounter Problems (Active)       PT Problem       Pt will demonstrate min A with bed mobility to edge of bed.   (Progressing)       Start:  06/27/25    Expected End:  07/11/25            Pt will demonstrate min A with sit to stand/chair transfers with FWW.   (Progressing)       Start:  06/27/25    Expected End:  07/11/25            Pt will ambulate 30 feet with FWW min A .   (Progressing)       Start:  06/27/25    Expected End:  07/11/25            Pt to demo improved BLE strength by being able to complete supine/seated thera ex 2x20 BLEs with 4 or less rest breaks .   (Progressing)       Start:  06/27/25    Expected End:  07/11/25               Pain - Adult            Education Documentation  Mobility Training, taught by Frank Cueva, PT at 6/27/2025 11:59 AM.  Learner: Patient  Readiness: Acceptance  Method:  Explanation  Response: Verbalizes Understanding  Comment: transfers from BSC    Education Comments  No comments found.

## 2025-06-27 NOTE — CARE PLAN
The patient's goals for the shift include      The clinical goals for the shift include safety and comfort      Problem: Pain - Adult  Goal: Verbalizes/displays adequate comfort level or baseline comfort level  Outcome: Progressing     Problem: Safety - Adult  Goal: Free from fall injury  Outcome: Progressing

## 2025-06-27 NOTE — PROGRESS NOTES
Medical Group Progress Note  ASSESSMENT & PLAN:     Acute blood loss anemia, symptomatic  GI bleed  Ulcerative colitis  -having increased frequency of loose, bloody stool and associated abdominal pain  -hgb down to 6.5; baseline ~9-10  -transfusing 2uPRBC in ED  -CT abd/pel showing nonspecific mild colitis from the hepatic flexure down through the rectum probably consistent with her known UC  -has mostly been on steroids previously, has refused biologics  -looks like most recently she was on mesalamine with GI at [facility]  -start steroids  -GI consult  -clear liquid diet for now  -check ESR, CRP, calprotectin     Acute non-occlusive DVT  -involving the right extrarenal iliac vein, right common iliac vein, infrarenal IVC  -likely related to her IBD  -don't think we can fully anticoagulate at this point given her significant symptomatic anemia and GI bleeding  -will get formal bilateral duplex  -I will give prophylactic dose lovenox for now and follow her stool  -will have vascular see; would see if IVC filter can be placed but not sure if possible with IVC involvement; ? Whether thrombectomy is a possibility     Hypokalemia  -replete     HTN  HLD  -hold antihypertensives for now              VTE Prophylaxis: held for now    6/27/25:  Hgb remains stable though still with bloody loose stools  Cont daily IV steroids  Flex sig yesterday showing severe erythematous ulcerated mucosa in the descending colon, sigmoid colon, rectum; biopsies taken  Plan to initiate biologic as outpt  Filter in place  Plan to remove IVC filter and start on anticoagulation when GI issues are under better control and hgb stable  C. Diff negative, stool PCR panel pending  Plan SNF at discharge; will remain here to get better control of her GI symptoms and hopefully stop bleeding with steroids as above      Juan Daniel Hinojosa MD    SUBJECTIVE     No overnight events. Groin pain resolved. Still with abd pain. Still having loose bloody stool. No  chest pain or dyspnea. No fever.     OBJECTIVE:     Last Recorded Vitals:  Vitals:    06/27/25 0102 06/27/25 0440 06/27/25 0750 06/27/25 1100   BP: 128/68 131/74 139/70 135/63   BP Location: Left arm  Left arm Left arm   Patient Position: Lying  Lying Lying   Pulse: 97 85 84 110   Resp: 18  18 18   Temp: 36.6 °C (97.9 °F)  36.5 °C (97.7 °F) 36.6 °C (97.9 °F)   TempSrc: Temporal  Temporal Temporal   SpO2: 96% 98% 96% 98%   Weight:       Height:         Last I/O:  I/O last 3 completed shifts:  In: 700 (11.5 mL/kg) [P.O.:600; I.V.:100 (1.6 mL/kg)]  Out: - (0 mL/kg)   Weight: 61 kg     Physical Exam    GEN: ill appearing; pale  HEENT: NCAT, PERRLA, EOMI, moist mucous membranes  NECK: supple, no masses  CV: RRR, no m/r/g, no LE edema  LUNGS: CTAB, no w/r/c  ABD: soft, mild diffuse TTP without rebound or guarding  SKIN: no rashes  MSK; no gross deformities, normal joints; R groin site c/d/I no swelling  NEURO: A+Ox3, no FND  PSYCH: appropriate mood, affect    Inpatient Medications:  Scheduled Medications[1]    PRN Medications  PRN Medications[2]    Continuous Medications:  Continuous Medications[3]  LABS AND IMAGING:     Labs:  Results from last 7 days   Lab Units 06/27/25  0646 06/26/25  0545 06/25/25  0531   WBC AUTO x10*3/uL 10.7 13.9* 14.2*   RBC AUTO x10*6/uL 4.01 3.79* 4.01   HEMOGLOBIN g/dL 9.8* 9.4* 9.9*   HEMATOCRIT % 31.6* 30.3* 31.5*   MCV fL 79* 80 79*   MCH pg 24.4* 24.8* 24.7*   MCHC g/dL 31.0* 31.0* 31.4*   RDW % 19.4* 18.6* 18.6*   PLATELETS AUTO x10*3/uL 336 360 423     Results from last 7 days   Lab Units 06/27/25  0646 06/26/25  0545 06/25/25  0531   SODIUM mmol/L 140 140 137   POTASSIUM mmol/L 3.3* 4.1 3.7   CHLORIDE mmol/L 108* 112* 109*   CO2 mmol/L 21 22 21   BUN mg/dL 12 9 8   CREATININE mg/dL 0.65 0.56 0.57   GLUCOSE mg/dL 87 123* 140*   PROTEIN TOTAL g/dL 6.3* 6.0* 6.5   CALCIUM mg/dL 7.9* 8.0* 7.9*   BILIRUBIN TOTAL mg/dL 0.5 0.4 0.6   ALK PHOS U/L 76 72 76   AST U/L 12 11 10   ALT U/L 10 8 8            Results from last 7 days   Lab Units 06/24/25  1344   TROPHS ng/L 6     Imaging:  Flexible Sigmoidoscopy  Table formatting from the original result was not included.  Impression  Severe erythematous, ulcerated mucosa in the descending colon, sigmoid   colon and rectum in continuous fashion consistent with Smith Grade 3   colitis. Performed cold forceps biopsy to rule out superimposed CMV.     Findings  Severe, continuous erythematous and ulcerated mucosa in the descending   colon, sigmoid colon and rectum; performed cold forceps biopsy       Recommendation  Await pathology results   Follow up with primary gastroenterologist   Continue IV solumedrol, trend daily CRP  Complete biologic workup if not recently updated         Indication  Abnormal findings on diagnostic imaging of other parts of digestive tract,   Ulcerative colitis with complication, unspecified location (Multi)    Post-Op Diagnosis  None    Staff  Staff Role   Viri Elizalde MD Proceduralist     Medications  See Anesthesia Record.     Preprocedure  A history and physical has been performed, and patient medication   allergies have been reviewed. The patient's tolerance of previous   anesthesia has been reviewed. The risks and benefits of the procedure and   the sedation options and risks were discussed with the patient. All   questions were answered and informed consent obtained.    Details of the Procedure  The patient underwent monitored anesthesia care, which was administered by   an anesthesia professional. The patient's blood pressure, heart rate,   level of consciousness, oxygen saturation, respirations, ECG and ETCO2   were monitored throughout the procedure. A digital rectal exam was   performed. The scope was introduced through the anus and advanced to the   descending colon. Retroflexion was not performed due to inflammation. The   patient's estimated blood loss was minimal. The procedure was not   difficult. The patient tolerated  the procedure well. There were no   apparent adverse events.     Events  Procedure Events   Event Event Time   ENDO SCOPE IN TIME 6/26/2025  5:36 PM   ENDO SCOPE OUT TIME 6/26/2025  5:43 PM     Specimens  ID Type Source Tests Collected by Time   1 : R/O CMV, Colitis, HX of UC Tissue COLON - DESCENDING BIOPSY SURGICAL   PATHOLOGY EXAM Viri Elizalde MD 6/26/2025 1739     Procedure Location  [address] [address]  [phone]    Referring Provider  Viky May, APRN-CNP    Procedure Provider  MD Viky Garrett  Invasive vascular procedure     [facility], Cath Lab         [address] [place] [id number]    Cardiovascular Catheterization Report    Patient Name:      MERVIN DENNEY   Performing           [id number] Sidra Valles                                         Physician:           MD  Study Date:        6/25/2025           Verifying Physician: Adolfo Valles MD  MRN/PID:           [id number]            Cardiologist:  Accession#:        [id number]        Ordering Provider:   [id number] ASHLEY COLBERT  Date of Birth/Age: 1955 / 69 years Fellow:  Gender:            F                   Fellow:  Encounter#:        [id number]       Study: IVC - Inferior Vena Cava Filter       Procedure Description:  After infiltration with 2% Lidocaine, the was cannulated with a modified Seldinger technique. After infiltration of local anesthetic, the right femoral vein was cannulated with a percutaneous technique. A 7 Hebrew sheath was placed in the vein. Post-procedure, the venous sheath was pulled and pressure was applied to the site.     Peripheral Interventions:  IVC filter was placed under floroscopy guidance after vennogram ws done no complication.     Hemo  Personnel:  +---------------+---------+  Name           Duty       +---------------+---------+  Sidra Valles MD 1  +---------------+---------+       Cardiac Cath Post Procedure Notes:  Post Procedure Diagnosis: IVC filter placement.  Blood Loss:               Estimated blood loss during the procedure was 2 mls.  Specimens Removed:        Number of specimen(s) removed: none.    ____________________________________________________________________________________  CONCLUSIONS:   1. Successful placement of IVC filter.    ICD 10 Codes:  Personal history of thrombophlebitis-Z86.72     CPT Codes:  Venogram, IVC-73388; Insertion IVC filter-74308; Moderate Sedation Services 1st additional 15 minutes patient >5 years-35883; Moderate Sedation Services 2nd additional 15 minutes patient >5 years-29327     [id number] Sidra Valles MD  Performing Physician  Electronically signed by [id number] Valles MD on 6/25/2025 at 3:41:22 PM    ** Final **                [1] [Held by provider] enoxaparin, 40 mg, subcutaneous, q24h  methylPREDNISolone sodium succinate (PF), 40 mg, intravenous, q24h  pantoprazole, 40 mg, intravenous, Daily     [2] PRN medications: HYDROmorphone, ondansetron ODT **OR** ondansetron  [3]

## 2025-06-28 LAB
BASOPHILS # BLD AUTO: 0.02 X10*3/UL (ref 0–0.1)
BASOPHILS NFR BLD AUTO: 0.2 %
EOSINOPHIL # BLD AUTO: 0.02 X10*3/UL (ref 0–0.7)
EOSINOPHIL NFR BLD AUTO: 0.2 %
ERYTHROCYTE [DISTWIDTH] IN BLOOD BY AUTOMATED COUNT: 19.5 % (ref 11.5–14.5)
HCT VFR BLD AUTO: 31 % (ref 36–46)
HGB BLD-MCNC: 9.4 G/DL (ref 12–16)
HOLD SPECIMEN: NORMAL
HOLD SPECIMEN: NORMAL
IMM GRANULOCYTES # BLD AUTO: 0.09 X10*3/UL (ref 0–0.7)
IMM GRANULOCYTES NFR BLD AUTO: 0.8 % (ref 0–0.9)
LYMPHOCYTES # BLD AUTO: 2.9 X10*3/UL (ref 1.2–4.8)
LYMPHOCYTES NFR BLD AUTO: 24.2 %
MCH RBC QN AUTO: 24.4 PG (ref 26–34)
MCHC RBC AUTO-ENTMCNC: 30.3 G/DL (ref 32–36)
MCV RBC AUTO: 81 FL (ref 80–100)
MONOCYTES # BLD AUTO: 1.22 X10*3/UL (ref 0.1–1)
MONOCYTES NFR BLD AUTO: 10.2 %
NEUTROPHILS # BLD AUTO: 7.75 X10*3/UL (ref 1.2–7.7)
NEUTROPHILS NFR BLD AUTO: 64.4 %
NRBC BLD-RTO: 0.2 /100 WBCS (ref 0–0)
PLATELET # BLD AUTO: 263 X10*3/UL (ref 150–450)
RBC # BLD AUTO: 3.85 X10*6/UL (ref 4–5.2)
VZV IGM SER IA-ACNC: 0.5 ISR
WBC # BLD AUTO: 12 X10*3/UL (ref 4.4–11.3)

## 2025-06-28 PROCEDURE — 2500000001 HC RX 250 WO HCPCS SELF ADMINISTERED DRUGS (ALT 637 FOR MEDICARE OP): Performed by: INTERNAL MEDICINE

## 2025-06-28 PROCEDURE — 36415 COLL VENOUS BLD VENIPUNCTURE: CPT | Performed by: INTERNAL MEDICINE

## 2025-06-28 PROCEDURE — 2500000004 HC RX 250 GENERAL PHARMACY W/ HCPCS (ALT 636 FOR OP/ED): Performed by: INTERNAL MEDICINE

## 2025-06-28 PROCEDURE — 97530 THERAPEUTIC ACTIVITIES: CPT | Mod: GO,CO

## 2025-06-28 PROCEDURE — 1210000001 HC SEMI-PRIVATE ROOM DAILY

## 2025-06-28 PROCEDURE — 97530 THERAPEUTIC ACTIVITIES: CPT | Mod: GP,CQ

## 2025-06-28 PROCEDURE — 97535 SELF CARE MNGMENT TRAINING: CPT | Mod: GO,CO

## 2025-06-28 PROCEDURE — 85025 COMPLETE CBC W/AUTO DIFF WBC: CPT | Performed by: INTERNAL MEDICINE

## 2025-06-28 PROCEDURE — 99232 SBSQ HOSP IP/OBS MODERATE 35: CPT | Performed by: INTERNAL MEDICINE

## 2025-06-28 RX ADMIN — HYDROMORPHONE HYDROCHLORIDE 0.4 MG: 1 INJECTION, SOLUTION INTRAMUSCULAR; INTRAVENOUS; SUBCUTANEOUS at 21:53

## 2025-06-28 RX ADMIN — METHYLPREDNISOLONE SODIUM SUCCINATE 40 MG: 40 INJECTION, POWDER, FOR SOLUTION INTRAMUSCULAR; INTRAVENOUS at 09:51

## 2025-06-28 RX ADMIN — OXYCODONE HYDROCHLORIDE 5 MG: 5 TABLET ORAL at 02:17

## 2025-06-28 RX ADMIN — PANTOPRAZOLE SODIUM 40 MG: 40 INJECTION, POWDER, LYOPHILIZED, FOR SOLUTION INTRAVENOUS at 09:51

## 2025-06-28 RX ADMIN — HYDROMORPHONE HYDROCHLORIDE 0.4 MG: 1 INJECTION, SOLUTION INTRAMUSCULAR; INTRAVENOUS; SUBCUTANEOUS at 13:19

## 2025-06-28 RX ADMIN — HYDROMORPHONE HYDROCHLORIDE 0.4 MG: 1 INJECTION, SOLUTION INTRAMUSCULAR; INTRAVENOUS; SUBCUTANEOUS at 09:51

## 2025-06-28 ASSESSMENT — COGNITIVE AND FUNCTIONAL STATUS - GENERAL
DRESSING REGULAR UPPER BODY CLOTHING: A LOT
MOVING TO AND FROM BED TO CHAIR: A LITTLE
DRESSING REGULAR LOWER BODY CLOTHING: A LOT
MOBILITY SCORE: 14
WALKING IN HOSPITAL ROOM: TOTAL
TURNING FROM BACK TO SIDE WHILE IN FLAT BAD: A LITTLE
WALKING IN HOSPITAL ROOM: A LOT
HELP NEEDED FOR BATHING: A LOT
HELP NEEDED FOR BATHING: A LOT
DRESSING REGULAR LOWER BODY CLOTHING: A LOT
MOBILITY SCORE: 14
TURNING FROM BACK TO SIDE WHILE IN FLAT BAD: A LITTLE
CLIMB 3 TO 5 STEPS WITH RAILING: TOTAL
MOVING FROM LYING ON BACK TO SITTING ON SIDE OF FLAT BED WITH BEDRAILS: A LITTLE
STANDING UP FROM CHAIR USING ARMS: A LOT
PERSONAL GROOMING: A LOT
TOILETING: A LOT
CLIMB 3 TO 5 STEPS WITH RAILING: TOTAL
DAILY ACTIVITIY SCORE: 14
EATING MEALS: A LITTLE
MOVING TO AND FROM BED TO CHAIR: TOTAL
TOILETING: A LOT
STANDING UP FROM CHAIR USING ARMS: A LOT
EATING MEALS: A LITTLE
DRESSING REGULAR UPPER BODY CLOTHING: A LOT
MOBILITY SCORE: 11
DAILY ACTIVITIY SCORE: 13
WALKING IN HOSPITAL ROOM: A LOT
MOVING FROM LYING ON BACK TO SITTING ON SIDE OF FLAT BED WITH BEDRAILS: A LITTLE
PERSONAL GROOMING: A LITTLE
TOILETING: A LOT
DRESSING REGULAR UPPER BODY CLOTHING: A LITTLE
PERSONAL GROOMING: A LOT
DAILY ACTIVITIY SCORE: 14
CLIMB 3 TO 5 STEPS WITH RAILING: TOTAL
MOVING TO AND FROM BED TO CHAIR: A LITTLE
DRESSING REGULAR LOWER BODY CLOTHING: TOTAL
MOVING FROM LYING ON BACK TO SITTING ON SIDE OF FLAT BED WITH BEDRAILS: A LITTLE
TURNING FROM BACK TO SIDE WHILE IN FLAT BAD: A LITTLE
STANDING UP FROM CHAIR USING ARMS: A LOT
HELP NEEDED FOR BATHING: A LOT

## 2025-06-28 ASSESSMENT — PAIN SCALES - GENERAL
PAINLEVEL_OUTOF10: 7
PAINLEVEL_OUTOF10: 5 - MODERATE PAIN
PAINLEVEL_OUTOF10: 8
PAINLEVEL_OUTOF10: 5 - MODERATE PAIN
PAINLEVEL_OUTOF10: 9
PAINLEVEL_OUTOF10: 8

## 2025-06-28 ASSESSMENT — PAIN - FUNCTIONAL ASSESSMENT
PAIN_FUNCTIONAL_ASSESSMENT: 0-10

## 2025-06-28 ASSESSMENT — PAIN DESCRIPTION - LOCATION
LOCATION: ABDOMEN
LOCATION: ABDOMEN

## 2025-06-28 ASSESSMENT — PAIN DESCRIPTION - ORIENTATION
ORIENTATION: LOWER
ORIENTATION: LOWER

## 2025-06-28 ASSESSMENT — ACTIVITIES OF DAILY LIVING (ADL): HOME_MANAGEMENT_TIME_ENTRY: 16

## 2025-06-28 ASSESSMENT — PAIN DESCRIPTION - DESCRIPTORS
DESCRIPTORS: ACHING;BURNING
DESCRIPTORS: SHARP
DESCRIPTORS: SHARP

## 2025-06-28 NOTE — PROGRESS NOTES
Medical Group Progress Note  ASSESSMENT & PLAN:     Acute blood loss anemia, symptomatic  GI bleed  Ulcerative colitis  -having increased frequency of loose, bloody stool and associated abdominal pain  -hgb down to 6.5; baseline ~9-10  -transfusing 2uPRBC in ED  -CT abd/pel showing nonspecific mild colitis from the hepatic flexure down through the rectum probably consistent with her known UC  -has mostly been on steroids previously, has refused biologics  -looks like most recently she was on mesalamine with GI at [facility]  -start steroids  -GI consult  -clear liquid diet for now  -check ESR, CRP, calprotectin     Acute non-occlusive DVT  -involving the right extrarenal iliac vein, right common iliac vein, infrarenal IVC  -likely related to her IBD  -don't think we can fully anticoagulate at this point given her significant symptomatic anemia and GI bleeding  -will get formal bilateral duplex  -I will give prophylactic dose lovenox for now and follow her stool  -will have vascular see; would see if IVC filter can be placed but not sure if possible with IVC involvement; ? Whether thrombectomy is a possibility     Hypokalemia  -replete     HTN  HLD  -hold antihypertensives for now              VTE Prophylaxis: held for now    6/28/25:  Hgb remains stable, stools slowly improving  Abd pain becoming more localized, beginning to improve  Cont daily IV steroids  Flex sig showing severe erythematous ulcerated mucosa in the descending colon, sigmoid colon, rectum; biopsies taken  Plan to initiate biologic as outpt  Filter in place  Plan to remove IVC filter and start on anticoagulation when GI issues are under better control and hgb stable  C. Diff negative, stool PCR panel pending  Plan SNF once pain under better control and stool frequency/bleeding improved  Cont clear liquids today possibly advance tomorrow      Juan Daniel Hinojosa MD    SUBJECTIVE     No overnight events. Pain better. Tolerating liquids though thinks it  occasionally makes pain worse. Frequent stools but bleeding slowing down. No fever.     OBJECTIVE:     Last Recorded Vitals:  Vitals:    06/27/25 1538 06/27/25 1959 06/28/25 0435 06/28/25 0700   BP: 132/70 158/77 147/74 151/68   BP Location:  Left arm Left arm Left arm   Patient Position:  Lying Lying Lying   Pulse: 68 91 80 84   Resp:  18 16 18   Temp:  36.5 °C (97.7 °F) 36.9 °C (98.4 °F) 37.1 °C (98.8 °F)   TempSrc:  Temporal Temporal Temporal   SpO2: 97% 97% 97% 99%   Weight:       Height:         Last I/O:  I/O last 3 completed shifts:  In: - (0 mL/kg)   Out: 650 (10.7 mL/kg) [Urine:650 (0.3 mL/kg/hr)]  Weight: 61 kg     Physical Exam    GEN: ill appearing; pale  HEENT: NCAT, PERRLA, EOMI, moist mucous membranes  NECK: supple, no masses  CV: RRR, no m/r/g, no LE edema  LUNGS: CTAB, no w/r/c  ABD: soft, mild diffuse TTP without rebound or guarding  SKIN: no rashes  MSK; no gross deformities, normal joints; R groin site c/d/I no swelling  NEURO: A+Ox3, no FND  PSYCH: appropriate mood, affect    Inpatient Medications:  Scheduled Medications[1]    PRN Medications  PRN Medications[2]    Continuous Medications:  Continuous Medications[3]  LABS AND IMAGING:     Labs:  Results from last 7 days   Lab Units 06/28/25  0659 06/27/25  0646 06/26/25  0545   WBC AUTO x10*3/uL 12.0* 10.7 13.9*   RBC AUTO x10*6/uL 3.85* 4.01 3.79*   HEMOGLOBIN g/dL 9.4* 9.8* 9.4*   HEMATOCRIT % 31.0* 31.6* 30.3*   MCV fL 81 79* 80   MCH pg 24.4* 24.4* 24.8*   MCHC g/dL 30.3* 31.0* 31.0*   RDW % 19.5* 19.4* 18.6*   PLATELETS AUTO x10*3/uL 263 336 360     Results from last 7 days   Lab Units 06/27/25  0646 06/26/25  0545 06/25/25  0531   SODIUM mmol/L 140 140 137   POTASSIUM mmol/L 3.3* 4.1 3.7   CHLORIDE mmol/L 108* 112* 109*   CO2 mmol/L 21 22 21   BUN mg/dL 12 9 8   CREATININE mg/dL 0.65 0.56 0.57   GLUCOSE mg/dL 87 123* 140*   PROTEIN TOTAL g/dL 6.3* 6.0* 6.5   CALCIUM mg/dL 7.9* 8.0* 7.9*   BILIRUBIN TOTAL mg/dL 0.5 0.4 0.6   ALK PHOS U/L 76  72 76   AST U/L 12 11 10   ALT U/L 10 8 8           Results from last 7 days   Lab Units 06/24/25  1344   TROPHS ng/L 6     Imaging:  Flexible Sigmoidoscopy  Table formatting from the original result was not included.  Impression  Severe erythematous, ulcerated mucosa in the descending colon, sigmoid   colon and rectum in continuous fashion consistent with Smith Grade 3   colitis. Performed cold forceps biopsy to rule out superimposed CMV.     Findings  Severe, continuous erythematous and ulcerated mucosa in the descending   colon, sigmoid colon and rectum; performed cold forceps biopsy       Recommendation  Await pathology results   Follow up with primary gastroenterologist   Continue IV solumedrol, trend daily CRP  Complete biologic workup if not recently updated         Indication  Abnormal findings on diagnostic imaging of other parts of digestive tract,   Ulcerative colitis with complication, unspecified location (Multi)    Post-Op Diagnosis  None    Staff  Staff Role   Viri Elizalde MD Proceduralist     Medications  See Anesthesia Record.     Preprocedure  A history and physical has been performed, and patient medication   allergies have been reviewed. The patient's tolerance of previous   anesthesia has been reviewed. The risks and benefits of the procedure and   the sedation options and risks were discussed with the patient. All   questions were answered and informed consent obtained.    Details of the Procedure  The patient underwent monitored anesthesia care, which was administered by   an anesthesia professional. The patient's blood pressure, heart rate,   level of consciousness, oxygen saturation, respirations, ECG and ETCO2   were monitored throughout the procedure. A digital rectal exam was   performed. The scope was introduced through the anus and advanced to the   descending colon. Retroflexion was not performed due to inflammation. The   patient's estimated blood loss was minimal. The procedure  was not   difficult. The patient tolerated the procedure well. There were no   apparent adverse events.     Events  Procedure Events   Event Event Time   ENDO SCOPE IN TIME 6/26/2025  5:36 PM   ENDO SCOPE OUT TIME 6/26/2025  5:43 PM     Specimens  ID Type Source Tests Collected by Time   1 : R/O CMV, Colitis, HX of UC Tissue COLON - DESCENDING BIOPSY SURGICAL   PATHOLOGY EXAM Viri Elizalde MD 6/26/2025 1739     Procedure Location  [address] [address]  [phone]    Referring Provider  Viky May, APRN-CNP    Procedure Provider  MD Viky Garrett  Invasive vascular procedure     [facility], Cath Lab         [address] [place] [id number]    Cardiovascular Catheterization Report    Patient Name:      MERVIN DENNEY   Performing           [id number] Sidra Valles                                         Physician:           MD  Study Date:        6/25/2025           Verifying Physician: [id number] Valles MD  MRN/PID:           [id number]            Cardiologist:  Accession#:        [id number]        Ordering Provider:   [id number] ASHLEY COLBERT  Date of Birth/Age: 1955 / 69 years Fellow:  Gender:            F                   Fellow:  Encounter#:        [id number]       Study: IVC - Inferior Vena Cava Filter       Procedure Description:  After infiltration with 2% Lidocaine, the was cannulated with a modified Seldinger technique. After infiltration of local anesthetic, the right femoral vein was cannulated with a percutaneous technique. A 7 Lithuanian sheath was placed in the vein. Post-procedure, the venous sheath was pulled and pressure was applied to the site.     Peripheral Interventions:  IVC filter was placed under floroscopy guidance after vennogram ws done no  complication.     Hemo Personnel:  +---------------+---------+  Name           Duty       +---------------+---------+  Sidra Valles MD 1  +---------------+---------+       Cardiac Cath Post Procedure Notes:  Post Procedure Diagnosis: IVC filter placement.  Blood Loss:               Estimated blood loss during the procedure was 2 mls.  Specimens Removed:        Number of specimen(s) removed: none.    ____________________________________________________________________________________  CONCLUSIONS:   1. Successful placement of IVC filter.    ICD 10 Codes:  Personal history of thrombophlebitis-Z86.72     CPT Codes:  Venogram, IVC-62159; Insertion IVC filter-94993; Moderate Sedation Services 1st additional 15 minutes patient >5 years-46003; Moderate Sedation Services 2nd additional 15 minutes patient >5 years-47076     [id number] Sidra Valles MD  Performing Physician  Electronically signed by Adolfo Valles MD on 6/25/2025 at 3:41:22 PM    ** Final **                  [1] [Held by provider] enoxaparin, 40 mg, subcutaneous, q24h  methylPREDNISolone sodium succinate (PF), 40 mg, intravenous, q24h  pantoprazole, 40 mg, intravenous, Daily     [2] PRN medications: HYDROmorphone, ondansetron ODT **OR** ondansetron, oxyCODONE  [3]

## 2025-06-28 NOTE — PROGRESS NOTES
Occupational Therapy    OT Treatment    Patient Name: Leola Jennings  MRN: [id number]  Department: [facility]  Room: [address] [place] [address]-  Today's Date: 6/28/2025  Time Calculation  Start Time: 1029  Stop Time: 1100  Time Calculation (min): 31 min      Assessment:  End of Session Communication: Bedside nurse  End of Session Patient Position: Up in chair, Alarm on     Plan:  Treatment Interventions: ADL retraining, Functional transfer training, Endurance training, Compensatory technique education  OT Frequency: 3 times per week  Treatment Interventions: ADL retraining, Functional transfer training, Endurance training, Compensatory technique education    Subjective   OT Visit Info:  OT Received On: 06/28/25  General Visit Info:  General  Co-Treatment: PT  Co-Treatment Reason: Due to pt's significant debility for optimal safety and therapy session  Prior to Session Communication: Bedside nurse  Patient Position Received: Bed, 3 rail up, Alarm on  Precautions:  Medical Precautions: Fall precautions    Pain:  Pain Assessment  Pain Assessment: 0-10  0-10 (Numeric) Pain Score: 5 - Moderate pain  Pain Location: Abdomen    Objective    Cognition:  Cognition  Overall Cognitive Status: Impaired  Orientation Level: Oriented X4  Following Commands: Follows multistep commands with repetition (and time.)  Safety Judgment: Decreased awareness of need for safety precautions  Problem Solving: Assistance required to implement solutions  Cognition Comments: very anxious throughout treatment. hyper focused on past surgery and current status. required freq cues for redirection and attending to tasks  Insight: Moderate  Impulsive: Mildly     Activities of Daily Living: Toileting  Toileting Level of Assistance:  (supine in bed with set up and SBA)  Where Assessed: Bed level  Functional Standing Tolerance:  Functional Standing Tolerance Comments: patient stood for a total of 1 min total with 2 ue support and bilateral knee blocking at all times.  Bed  Mobility/Transfers: Bed Mobility  Bed Mobility: Yes  Bed Mobility 1  Bed Mobility 1: Supine to sitting, Scooting  Level of Assistance 1: Contact guard, Minimal verbal cues  Bed Mobility Comments 1: 1 verbal cue for hand placement and safety    Transfers  Transfer: Yes  Transfer 1  Technique 1: Sit to stand  Transfer Device 1: Walker, Gait belt  Transfer Level of Assistance 1: Maximum assistance, +2, Maximum tactile cues, Maximum verbal cues  Trials/Comments 1: with hand over hand with max cues for hand placement for safety. increased time required for safety  Transfers 2  Transfer From 2: Bed to  Transfer to 2: Chair with arms  Technique 2: Stand pivot  Transfer Device 2: Gait belt  Transfer Level of Assistance 2: Arm in arm assistance, Dependent, +2, Maximum tactile cues, Maximum verbal cues    Sitting Balance:  Dynamic Sitting Balance  Dynamic Sitting-Level of Assistance:  (fair/fair+)  Standing Balance:  Dynamic Standing Balance  Dynamic Standing-Level of Assistance:  (poor-)       Therapy/Activity: Therapeutic Exercise  Therapeutic Exercise Performed: Yes  Therapeutic Exercise Activity 1: seated in recliner- chair push ups x10    Therapeutic Activity  Therapeutic Activity Performed: Yes  Therapeutic Activity 1: patient sat EOB for a total of 10 mins this date with fair/fair+ balance with 2-0 ue support at all times.    Outcome Measures:[facility] Daily Activity  Putting on and taking off regular lower body clothing: Total  Bathing (including washing, rinsing, drying): A lot  Putting on and taking off regular upper body clothing: A little  Toileting, which includes using toilet, bedpan or urinal: A lot  Taking care of personal grooming such as brushing teeth: A lot  Eating Meals: None  Daily Activity - Total Score: 14    Education Documentation  Body Mechanics, taught by WARREN Mayo at 6/28/2025 12:44 PM.  Learner: Patient  Readiness: Acceptance  Method: Explanation  Response: Needs Reinforcement    ADL  Training, taught by WARREN Mayo at 6/28/2025 12:44 PM.  Learner: Patient  Readiness: Acceptance  Method: Explanation  Response: Needs Reinforcement    OP EDUCATION:  Education  Individual(s) Educated: Patient  Education Provided: Symptom management, Ergonomics and postural realignment, Joint protection and energy conservation, Fall precautons, Risk and benefits of OT discussed with patient or other, POC discussed and agreed upon  Equipment:  (EC tech, PLB, and safety)  Risk and Benefits Discussed with Patient/Caregiver/Other: yes  Patient/Caregiver Demonstrated Understanding: yes  Plan of Care Discussed and Agreed Upon: yes  Patient Response to Education: Patient/Caregiver Verbalized Understanding of Information    Goals:  Encounter Problems       Encounter Problems (Active)       OT Goals       Good dyn sitting balance EOB for ADL participation (Progressing)       Start:  06/27/25    Expected End:  07/11/25            Min A for all functional transfers  (Progressing)       Start:  06/27/25    Expected End:  07/11/25            Min A for LB dressing  (Progressing)       Start:  06/27/25    Expected End:  07/11/25            Min A for toileting tasks and clothing mgmt  (Progressing)       Start:  06/27/25    Expected End:  07/11/25            Pt. will tolerate static/dynamic standing x2 minutes to progress strength and endurance for ADLs and transfers (Progressing)       Start:  06/27/25    Expected End:  07/11/25               OT Problem

## 2025-06-28 NOTE — PROGRESS NOTES
Physical Therapy    Physical Therapy Treatment    Patient Name: Leola Jennings  MRN: [id number]  Today's Date: 6/28/2025  Time Calculation  Start Time: 1030  Stop Time: 1100  Time Calculation (min): 30 min     1103/1103-A    Assessment/Plan   PT Assessment  PT Assessment Results: Decreased strength, Decreased range of motion, Decreased endurance, Impaired balance, Decreased mobility, Decreased coordination, Pain  Rehab Prognosis: Fair  Barriers to Discharge Home: Caregiver assistance, Physical needs  Caregiver Assistance: Patient lives alone and/or does not have reliable caregiver assistance  Physical Needs: 24hr mobility assistance needed, 24hr ADL assistance needed  Evaluation/Treatment Tolerance: Patient limited by fatigue, Patient limited by pain  Strengths: Coping skills, Attitude of self, Premorbid level of function  Barriers to Participation: Comorbidities  End of Session Communication: Bedside nurse  Assessment Comment: Pt presents with fair effort throughout todays session. Demonstrates fair carry over of cues. Very particular on methods of performing tasks despite max education. Encouragement required to improve standing tolerance. Fear of falling limiting tolerance to transfers this date. Self-limiting factors present. Became slighlty argumentative with ecouragement required to sit up in recliner as well as benefits of positional changes. Call light and all needs in reach.  End of Session Patient Position: Up in chair, Alarm on  PT Plan  Treatment/Interventions: Bed mobility, Transfer training, Gait training, Stair training, Balance training, Strengthening, Endurance training, Therapeutic activity  PT Plan: Ongoing PT  PT Frequency: 4 times per week  PT Discharge Recommendations: Moderate intensity level of continued care, High intensity level of continued care  PT Recommended Transfer Status: Assist x1, Total assist  PT - OK to Discharge: Yes (once medically ready for discharge to next level of  care.)      General Visit Information:   PT  Visit  PT Received On: 06/28/25  General  Co-Treatment: OT  Co-Treatment Reason: Due to pt's significant debility for optimal safety and therapy session  Prior to Session Communication: Bedside nurse  Patient Position Received: Bed, 3 rail up, Alarm on (pt currently on bed pan)  General Comment: Pt agreeable to therapy this date.  Subjective     Precautions:  Precautions  Medical Precautions: Fall precautions       Objective     Pain:  Pain Assessment  Pain Assessment: 0-10  0-10 (Numeric) Pain Score: 5 - Moderate pain  Pain Location: Abdomen  Pain Orientation: Lower  Pain Descriptors: Sharp  Pain Interventions: Repositioned    Cognition:  Cognition  Overall Cognitive Status: Impaired  Following Commands: Follows multistep commands with repetition  Safety Judgment: Decreased awareness of need for assistance  Problem Solving: Assistance required to implement solutions  Cognition Comments: Pt is anxious with mobility, particular with sequencing of tasks  Insight: Mild  Impulsive: Mildly      Treatments:  Therapeutic Exercise  Therapeutic Exercise Performed: No (Pt demonstrates marching, LAQ and AP as part of a current HEP she follows, also mentions exercises classes she has taken prior to admission.)        Bed Mobility  Bed Mobility: Yes  Bed Mobility 1  Bed Mobility 1: Supine to sitting  Level of Assistance 1: Contact guard  Bed Mobility Comments 1: Pt performed supine<>EOB CGA with use of bedrails to lift trunk. Pt demonstrates ability to walk BLE off edge of bed. Increased time required for performance of task.     Transfers  Transfer: Yes  Transfer 1  Transfer From 1: Bed to  Transfer to 1: Chair with arms  Technique 1: Stand pivot  Transfer Device 1:  (no AD - to my person)  Transfer Level of Assistance 1: Dependent, +2, Maximum verbal cues  Trials/Comments 1: Pt performed stand pivot from EOB<>chair DEP x2. Pt requires max VC for sequencing, poor eccentric control to  "sitting position. Poor ability to provide assist for performance of transfer.  Transfers 2  Transfer From 2: Bed to  Transfer to 2: Stand  Technique 2: Stand to sit, Sit to stand  Transfer Device 2: Walker, Gait belt (FWW)  Transfer Level of Assistance 2: Maximum assistance, +2, Maximum verbal cues, Maximum tactile cues  Trials/Comments 2: Pt performed STS x2 from EOB<>FWW MaxA x2 for lifting to stand with bilat knee block to prevent buckling, required significant trunk support. VC required for sequencing and proper hand placement as pt tends to attempt to pull up from walker. Resistant to cues stating \"this is how I do it\" despite education on safety awareness with performance. Poor standing tolerance, first attempt ~45 seconds, second attempt ~10 seconds tolerance. PLB encouraged,cues to squeeze glutes and \"tuck in butt\".          Outcome Measures:     [facility] Basic Mobility  Turning from your back to your side while in a flat bed without using bedrails: A little  Moving from lying on your back to sitting on the side of a flat bed without using bedrails: A little  Moving to and from bed to chair (including a wheelchair): Total  Standing up from a chair using your arms (e.g. wheelchair or bedside chair): A lot  To walk in hospital room: Total  Climbing 3-5 steps with railing: Total  Basic Mobility - Total Score: 11                                      Education Documentation  Mobility Training, taught by Summer Krishnamurthy PTA at 6/28/2025 12:14 PM.  Learner: Patient  Readiness: Acceptance  Method: Explanation, Demonstration  Response: Verbalizes Understanding, Needs Reinforcement    Education Comments  No comments found.           EDUCATION:     Encounter Problems       Encounter Problems (Active)       PT Problem       Pt will demonstrate min A with bed mobility to edge of bed.   (Progressing)       Start:  06/27/25    Expected End:  07/11/25            Pt will demonstrate min A with sit to stand/chair transfers with " FWW.   (Progressing)       Start:  06/27/25    Expected End:  07/11/25            Pt will ambulate 30 feet with FWW min A .   (Not Progressing)       Start:  06/27/25    Expected End:  07/11/25            Pt to demo improved BLE strength by being able to complete supine/seated thera ex 2x20 BLEs with 4 or less rest breaks .   (Not Progressing)       Start:  06/27/25    Expected End:  07/11/25               Pain - Adult

## 2025-06-28 NOTE — CARE PLAN
The patient's goals for the shift include      The clinical goals for the shift include Safety    Problem: Pain - Adult  Goal: Verbalizes/displays adequate comfort level or baseline comfort level  Outcome: Progressing     Problem: Safety - Adult  Goal: Free from fall injury  Outcome: Progressing     Problem: Discharge Planning  Goal: Discharge to home or other facility with appropriate resources  Outcome: Progressing     Problem: Chronic Conditions and Co-morbidities  Goal: Patient's chronic conditions and co-morbidity symptoms are monitored and maintained or improved  Outcome: Progressing     Problem: Nutrition  Goal: Nutrient intake appropriate for maintaining nutritional needs  Outcome: Progressing     Problem: Skin  Goal: Decreased wound size/increased tissue granulation at next dressing change  Outcome: Progressing  Flowsheets (Taken 6/28/2025 4944)  Decreased wound size/increased tissue granulation at next dressing change:   Promote sleep for wound healing   Protective dressings over bony prominences   Utilize specialty bed per algorithm  Goal: Participates in plan/prevention/treatment measures  Outcome: Progressing  Goal: Prevent/manage excess moisture  Outcome: Progressing  Goal: Prevent/minimize sheer/friction injuries  Outcome: Progressing  Goal: Promote/optimize nutrition  Outcome: Progressing  Goal: Promote skin healing  Outcome: Progressing

## 2025-06-28 NOTE — CARE PLAN
The patient's goals for the shift include      The clinical goals for the shift include Pt. will be free of falls/injury throughout shift    Problem: Pain - Adult  Goal: Verbalizes/displays adequate comfort level or baseline comfort level  Outcome: Progressing  Flowsheets (Taken 6/28/2025 1711)  Verbalizes/displays adequate comfort level or baseline comfort level:   Encourage patient to monitor pain and request assistance   Assess pain using appropriate pain scale   Administer analgesics based on type and severity of pain and evaluate response   Implement non-pharmacological measures as appropriate and evaluate response   Notify Licensed Independent Practitioner if interventions unsuccessful or patient reports new pain   Consider cultural and social influences on pain and pain management     Problem: Safety - Adult  Goal: Free from fall injury  Outcome: Progressing  Flowsheets (Taken 6/28/2025 1711)  Free from fall injury: Instruct family/caregiver on patient safety     Problem: Discharge Planning  Goal: Discharge to home or other facility with appropriate resources  Outcome: Progressing     Problem: Chronic Conditions and Co-morbidities  Goal: Patient's chronic conditions and co-morbidity symptoms are monitored and maintained or improved  Outcome: Progressing  Flowsheets (Taken 6/28/2025 1711)  Care Plan - Patient's Chronic Conditions and Co-Morbidity Symptoms are Monitored and Maintained or Improved:   Monitor and assess patient's chronic conditions and comorbid symptoms for stability, deterioration, or improvement   Collaborate with multidisciplinary team to address chronic and comorbid conditions and prevent exacerbation or deterioration   Update acute care plan with appropriate goals if chronic or comorbid symptoms are exacerbated and prevent overall improvement and discharge     Problem: Nutrition  Goal: Nutrient intake appropriate for maintaining nutritional needs  Outcome: Progressing     Problem:  Skin  Goal: Decreased wound size/increased tissue granulation at next dressing change  Outcome: Progressing  Flowsheets (Taken 6/28/2025 1711)  Decreased wound size/increased tissue granulation at next dressing change: Promote sleep for wound healing  Goal: Participates in plan/prevention/treatment measures  Outcome: Progressing  Goal: Prevent/manage excess moisture  Outcome: Progressing  Flowsheets (Taken 6/28/2025 1711)  Prevent/manage excess moisture: Cleanse incontinence/protect with barrier cream     Problem: Fall/Injury  Goal: Not fall by end of shift  Outcome: Met  Goal: Be free from injury by end of the shift  Outcome: Met  Goal: Verbalize understanding of personal risk factors for fall in the hospital  Outcome: Progressing  Goal: Verbalize understanding of risk factor reduction measures to prevent injury from fall in the home  Outcome: Progressing  Goal: Use assistive devices by end of the shift  Outcome: Met  Note: Pt. Demonstrates appropriate use of call light for needs  Goal: Pace activities to prevent fatigue by end of the shift  Outcome: Progressing

## 2025-06-29 VITALS
BODY MASS INDEX: 26.41 KG/M2 | DIASTOLIC BLOOD PRESSURE: 71 MMHG | TEMPERATURE: 98.2 F | WEIGHT: 134.5 LBS | SYSTOLIC BLOOD PRESSURE: 137 MMHG | HEIGHT: 60 IN | HEART RATE: 83 BPM | RESPIRATION RATE: 18 BRPM | OXYGEN SATURATION: 96 %

## 2025-06-29 LAB
ANION GAP SERPL CALC-SCNC: 12 MMOL/L (ref 10–20)
BASOPHILS # BLD AUTO: 0.02 X10*3/UL (ref 0–0.1)
BASOPHILS NFR BLD AUTO: 0.2 %
BUN SERPL-MCNC: 6 MG/DL (ref 6–23)
CALCIUM SERPL-MCNC: 7.7 MG/DL (ref 8.6–10.3)
CHLORIDE SERPL-SCNC: 107 MMOL/L (ref 98–107)
CO2 SERPL-SCNC: 22 MMOL/L (ref 21–32)
CREAT SERPL-MCNC: 0.61 MG/DL (ref 0.5–1.05)
EGFRCR SERPLBLD CKD-EPI 2021: >90 ML/MIN/1.73M*2
EOSINOPHIL # BLD AUTO: 0.02 X10*3/UL (ref 0–0.7)
EOSINOPHIL NFR BLD AUTO: 0.2 %
ERYTHROCYTE [DISTWIDTH] IN BLOOD BY AUTOMATED COUNT: 20 % (ref 11.5–14.5)
GLUCOSE SERPL-MCNC: 87 MG/DL (ref 74–99)
HCT VFR BLD AUTO: 31.3 % (ref 36–46)
HGB BLD-MCNC: 9.2 G/DL (ref 12–16)
IMM GRANULOCYTES # BLD AUTO: 0.05 X10*3/UL (ref 0–0.7)
IMM GRANULOCYTES NFR BLD AUTO: 0.5 % (ref 0–0.9)
LYMPHOCYTES # BLD AUTO: 3.32 X10*3/UL (ref 1.2–4.8)
LYMPHOCYTES NFR BLD AUTO: 36.4 %
MCH RBC QN AUTO: 24.7 PG (ref 26–34)
MCHC RBC AUTO-ENTMCNC: 29.4 G/DL (ref 32–36)
MCV RBC AUTO: 84 FL (ref 80–100)
MONOCYTES # BLD AUTO: 0.9 X10*3/UL (ref 0.1–1)
MONOCYTES NFR BLD AUTO: 9.9 %
NEUTROPHILS # BLD AUTO: 4.82 X10*3/UL (ref 1.2–7.7)
NEUTROPHILS NFR BLD AUTO: 52.8 %
NRBC BLD-RTO: 0 /100 WBCS (ref 0–0)
PLATELET # BLD AUTO: 228 X10*3/UL (ref 150–450)
POTASSIUM SERPL-SCNC: 3.8 MMOL/L (ref 3.5–5.3)
RBC # BLD AUTO: 3.73 X10*6/UL (ref 4–5.2)
SODIUM SERPL-SCNC: 137 MMOL/L (ref 136–145)
WBC # BLD AUTO: 9.1 X10*3/UL (ref 4.4–11.3)

## 2025-06-29 PROCEDURE — 2500000004 HC RX 250 GENERAL PHARMACY W/ HCPCS (ALT 636 FOR OP/ED): Mod: JZ | Performed by: INTERNAL MEDICINE

## 2025-06-29 PROCEDURE — 1210000001 HC SEMI-PRIVATE ROOM DAILY

## 2025-06-29 PROCEDURE — 99232 SBSQ HOSP IP/OBS MODERATE 35: CPT | Performed by: INTERNAL MEDICINE

## 2025-06-29 PROCEDURE — 85025 COMPLETE CBC W/AUTO DIFF WBC: CPT | Performed by: INTERNAL MEDICINE

## 2025-06-29 PROCEDURE — 80048 BASIC METABOLIC PNL TOTAL CA: CPT | Performed by: INTERNAL MEDICINE

## 2025-06-29 PROCEDURE — 36415 COLL VENOUS BLD VENIPUNCTURE: CPT | Performed by: INTERNAL MEDICINE

## 2025-06-29 PROCEDURE — 2500000004 HC RX 250 GENERAL PHARMACY W/ HCPCS (ALT 636 FOR OP/ED): Performed by: INTERNAL MEDICINE

## 2025-06-29 RX ADMIN — METHYLPREDNISOLONE SODIUM SUCCINATE 40 MG: 40 INJECTION, POWDER, FOR SOLUTION INTRAMUSCULAR; INTRAVENOUS at 10:04

## 2025-06-29 RX ADMIN — HYDROMORPHONE HYDROCHLORIDE 0.4 MG: 1 INJECTION, SOLUTION INTRAMUSCULAR; INTRAVENOUS; SUBCUTANEOUS at 22:27

## 2025-06-29 RX ADMIN — HYDROMORPHONE HYDROCHLORIDE 0.4 MG: 1 INJECTION, SOLUTION INTRAMUSCULAR; INTRAVENOUS; SUBCUTANEOUS at 05:16

## 2025-06-29 RX ADMIN — PANTOPRAZOLE SODIUM 40 MG: 40 INJECTION, POWDER, LYOPHILIZED, FOR SOLUTION INTRAVENOUS at 10:04

## 2025-06-29 RX ADMIN — HYDROMORPHONE HYDROCHLORIDE 0.4 MG: 1 INJECTION, SOLUTION INTRAMUSCULAR; INTRAVENOUS; SUBCUTANEOUS at 10:04

## 2025-06-29 RX ADMIN — HYDROMORPHONE HYDROCHLORIDE 0.4 MG: 1 INJECTION, SOLUTION INTRAMUSCULAR; INTRAVENOUS; SUBCUTANEOUS at 19:27

## 2025-06-29 RX ADMIN — HYDROMORPHONE HYDROCHLORIDE 0.4 MG: 1 INJECTION, SOLUTION INTRAMUSCULAR; INTRAVENOUS; SUBCUTANEOUS at 14:42

## 2025-06-29 ASSESSMENT — PAIN DESCRIPTION - DESCRIPTORS
DESCRIPTORS: ACHING;SPASM;SHARP
DESCRIPTORS: ACHING
DESCRIPTORS: ACHING;OTHER (COMMENT)
DESCRIPTORS: ACHING;BURNING

## 2025-06-29 ASSESSMENT — PAIN SCALES - GENERAL
PAINLEVEL_OUTOF10: 7
PAINLEVEL_OUTOF10: 4
PAINLEVEL_OUTOF10: 9
PAINLEVEL_OUTOF10: 5 - MODERATE PAIN
PAINLEVEL_OUTOF10: 9
PAINLEVEL_OUTOF10: 5 - MODERATE PAIN

## 2025-06-29 ASSESSMENT — COGNITIVE AND FUNCTIONAL STATUS - GENERAL
MOVING FROM LYING ON BACK TO SITTING ON SIDE OF FLAT BED WITH BEDRAILS: A LITTLE
HELP NEEDED FOR BATHING: A LITTLE
DAILY ACTIVITIY SCORE: 19
DRESSING REGULAR UPPER BODY CLOTHING: A LITTLE
PERSONAL GROOMING: A LITTLE
DRESSING REGULAR LOWER BODY CLOTHING: A LITTLE
MOBILITY SCORE: 14
STANDING UP FROM CHAIR USING ARMS: A LOT
MOVING TO AND FROM BED TO CHAIR: A LITTLE
TURNING FROM BACK TO SIDE WHILE IN FLAT BAD: A LITTLE
CLIMB 3 TO 5 STEPS WITH RAILING: TOTAL
WALKING IN HOSPITAL ROOM: A LOT
TOILETING: A LITTLE

## 2025-06-29 ASSESSMENT — PAIN - FUNCTIONAL ASSESSMENT
PAIN_FUNCTIONAL_ASSESSMENT: 0-10

## 2025-06-29 ASSESSMENT — PAIN DESCRIPTION - ORIENTATION
ORIENTATION: MID;LOWER
ORIENTATION: LOWER;MID

## 2025-06-29 ASSESSMENT — PAIN DESCRIPTION - LOCATION
LOCATION: ABDOMEN
LOCATION: ABDOMEN

## 2025-06-29 ASSESSMENT — PAIN SCALES - WONG BAKER: WONGBAKER_NUMERICALRESPONSE: HURTS LITTLE MORE

## 2025-06-29 NOTE — CARE PLAN
The patient's goals for the shift include      The clinical goals for the shift include Pain will be monitored and minimized throuighout shift    Problem: Pain - Adult  Goal: Verbalizes/displays adequate comfort level or baseline comfort level  Outcome: Progressing  Flowsheets (Taken 6/29/2025 1323)  Verbalizes/displays adequate comfort level or baseline comfort level:   Encourage patient to monitor pain and request assistance   Assess pain using appropriate pain scale   Administer analgesics based on type and severity of pain and evaluate response   Implement non-pharmacological measures as appropriate and evaluate response   Consider cultural and social influences on pain and pain management   Notify Licensed Independent Practitioner if interventions unsuccessful or patient reports new pain     Problem: Safety - Adult  Goal: Free from fall injury  Outcome: Met  Flowsheets (Taken 6/29/2025 1323)  Free from fall injury: Instruct family/caregiver on patient safety     Problem: Discharge Planning  Goal: Discharge to home or other facility with appropriate resources  Outcome: Progressing     Problem: Chronic Conditions and Co-morbidities  Goal: Patient's chronic conditions and co-morbidity symptoms are monitored and maintained or improved  Outcome: Progressing  Flowsheets (Taken 6/29/2025 1323)  Care Plan - Patient's Chronic Conditions and Co-Morbidity Symptoms are Monitored and Maintained or Improved:   Monitor and assess patient's chronic conditions and comorbid symptoms for stability, deterioration, or improvement   Collaborate with multidisciplinary team to address chronic and comorbid conditions and prevent exacerbation or deterioration   Update acute care plan with appropriate goals if chronic or comorbid symptoms are exacerbated and prevent overall improvement and discharge     Problem: Nutrition  Goal: Nutrient intake appropriate for maintaining nutritional needs  Outcome: Progressing     Problem: Skin  Goal:  Decreased wound size/increased tissue granulation at next dressing change  Outcome: Progressing  Goal: Participates in plan/prevention/treatment measures  Outcome: Met  Goal: Prevent/manage excess moisture  Outcome: Met  Flowsheets (Taken 6/29/2025 1323)  Prevent/manage excess moisture:   Cleanse incontinence/protect with barrier cream   Moisturize dry skin  Goal: Prevent/minimize sheer/friction injuries  Outcome: Progressing  Flowsheets (Taken 6/29/2025 1323)  Prevent/minimize sheer/friction injuries:   Complete micro-shifts as needed if patient unable. Adjust patient position to relieve pressure points, not a full turn   HOB 30 degrees or less   Turn/reposition every 2 hours/use positioning/transfer devices   Use pull sheet  Goal: Promote/optimize nutrition  Outcome: Progressing  Goal: Promote skin healing  Outcome: Progressing     Problem: Skin  Goal: Decreased wound size/increased tissue granulation at next dressing change  Outcome: Progressing  Goal: Participates in plan/prevention/treatment measures  Outcome: Met  Goal: Prevent/manage excess moisture  Outcome: Met  Flowsheets (Taken 6/29/2025 1323)  Prevent/manage excess moisture:   Cleanse incontinence/protect with barrier cream   Moisturize dry skin  Goal: Prevent/minimize sheer/friction injuries  Outcome: Progressing  Flowsheets (Taken 6/29/2025 1323)  Prevent/minimize sheer/friction injuries:   Complete micro-shifts as needed if patient unable. Adjust patient position to relieve pressure points, not a full turn   HOB 30 degrees or less   Turn/reposition every 2 hours/use positioning/transfer devices   Use pull sheet  Goal: Promote/optimize nutrition  Outcome: Progressing  Goal: Promote skin healing  Outcome: Progressing     Problem: Fall/Injury  Goal: Verbalize understanding of personal risk factors for fall in the hospital  Outcome: Met  Goal: Verbalize understanding of risk factor reduction measures to prevent injury from fall in the home  Outcome:  Progressing  Goal: Pace activities to prevent fatigue by end of the shift  Outcome: Progressing

## 2025-06-29 NOTE — PROGRESS NOTES
Medical Group Progress Note  ASSESSMENT & PLAN:     Acute blood loss anemia, symptomatic  GI bleed  Ulcerative colitis  -having increased frequency of loose, bloody stool and associated abdominal pain  -hgb down to 6.5; baseline ~9-10  -transfusing 2uPRBC in ED  -CT abd/pel showing nonspecific mild colitis from the hepatic flexure down through the rectum probably consistent with her known UC  -has mostly been on steroids previously, has refused biologics  -looks like most recently she was on mesalamine with GI at [facility]  -start steroids  -GI consult  -clear liquid diet for now  -check ESR, CRP, calprotectin     Acute non-occlusive DVT  -involving the right extrarenal iliac vein, right common iliac vein, infrarenal IVC  -likely related to her IBD  -don't think we can fully anticoagulate at this point given her significant symptomatic anemia and GI bleeding  -will get formal bilateral duplex  -I will give prophylactic dose lovenox for now and follow her stool  -will have vascular see; would see if IVC filter can be placed but not sure if possible with IVC involvement; ? Whether thrombectomy is a possibility     Hypokalemia  -replete     HTN  HLD  -hold antihypertensives for now              VTE Prophylaxis: held for now    6/29/25:  Hgb remains stable, stools slowly improving  Abd pain becoming more localized, beginning to improve  Cont daily IV steroids  Flex sig showing severe erythematous ulcerated mucosa in the descending colon, sigmoid colon, rectum; biopsies taken  Plan to initiate biologic as outpt  Filter in place  Plan to remove IVC filter and start on anticoagulation when GI issues are under better control and hgb stable  Advance to full liquids today  Still requiring intermittent IV pain medication, will try to wean to orals      Juan Daniel Hinojosa MD    SUBJECTIVE     No overnight events. Symptoms overall improved but does hav eepisodes of more severe pain. Still with blood in stool. No chest pain or  dyspnea. Tolerating clear liquids.    OBJECTIVE:     Last Recorded Vitals:  Vitals:    06/28/25 1527 06/28/25 2029 06/29/25 0434 06/29/25 0732   BP: 138/75 154/74 158/73 138/65   BP Location:  Right arm Right arm    Patient Position:  Lying Lying    Pulse: 103 92 85 99   Resp:  18 16 19   Temp: 37 °C (98.6 °F) 36.2 °C (97.2 °F) 36 °C (96.8 °F) 36.9 °C (98.4 °F)   TempSrc:  Temporal Temporal Temporal   SpO2: 98% 98% 97% 90%   Weight:       Height:         Last I/O:  I/O last 3 completed shifts:  In: - (0 mL/kg)   Out: 650 (10.7 mL/kg) [Urine:650 (0.3 mL/kg/hr)]  Weight: 61 kg     Physical Exam    GEN: ill appearing; pale  HEENT: NCAT, PERRLA, EOMI, moist mucous membranes  NECK: supple, no masses  CV: RRR, no m/r/g, no LE edema  LUNGS: CTAB, no w/r/c  ABD: soft, mild diffuse TTP without rebound or guarding  SKIN: no rashes  MSK; no gross deformities, normal joints; R groin site c/d/I no swelling  NEURO: A+Ox3, no FND  PSYCH: appropriate mood, affect    Inpatient Medications:  Scheduled Medications[1]    PRN Medications  PRN Medications[2]    Continuous Medications:  Continuous Medications[3]  LABS AND IMAGING:     Labs:  Results from last 7 days   Lab Units 06/29/25  0538 06/28/25  0659 06/27/25  0646   WBC AUTO x10*3/uL 9.1 12.0* 10.7   RBC AUTO x10*6/uL 3.73* 3.85* 4.01   HEMOGLOBIN g/dL 9.2* 9.4* 9.8*   HEMATOCRIT % 31.3* 31.0* 31.6*   MCV fL 84 81 79*   MCH pg 24.7* 24.4* 24.4*   MCHC g/dL 29.4* 30.3* 31.0*   RDW % 20.0* 19.5* 19.4*   PLATELETS AUTO x10*3/uL 228 263 336     Results from last 7 days   Lab Units 06/29/25  0539 06/27/25  0646 06/26/25  0545 06/25/25  0531   SODIUM mmol/L 137 140 140 137   POTASSIUM mmol/L 3.8 3.3* 4.1 3.7   CHLORIDE mmol/L 107 108* 112* 109*   CO2 mmol/L 22 21 22 21   BUN mg/dL 6 12 9 8   CREATININE mg/dL 0.61 0.65 0.56 0.57   GLUCOSE mg/dL 87 87 123* 140*   PROTEIN TOTAL g/dL  --  6.3* 6.0* 6.5   CALCIUM mg/dL 7.7* 7.9* 8.0* 7.9*   BILIRUBIN TOTAL mg/dL  --  0.5 0.4 0.6   ALK PHOS  U/L  --  76 72 76   AST U/L  --  12 11 10   ALT U/L  --  10 8 8           Results from last 7 days   Lab Units 06/24/25  1344   TROPHS ng/L 6     Imaging:  Flexible Sigmoidoscopy  Table formatting from the original result was not included.  Impression  Severe erythematous, ulcerated mucosa in the descending colon, sigmoid   colon and rectum in continuous fashion consistent with Smith Grade 3   colitis. Performed cold forceps biopsy to rule out superimposed CMV.     Findings  Severe, continuous erythematous and ulcerated mucosa in the descending   colon, sigmoid colon and rectum; performed cold forceps biopsy       Recommendation  Await pathology results   Follow up with primary gastroenterologist   Continue IV solumedrol, trend daily CRP  Complete biologic workup if not recently updated         Indication  Abnormal findings on diagnostic imaging of other parts of digestive tract,   Ulcerative colitis with complication, unspecified location (Multi)    Post-Op Diagnosis  None    Staff  Staff Role   Viri Elizalde MD Proceduralist     Medications  See Anesthesia Record.     Preprocedure  A history and physical has been performed, and patient medication   allergies have been reviewed. The patient's tolerance of previous   anesthesia has been reviewed. The risks and benefits of the procedure and   the sedation options and risks were discussed with the patient. All   questions were answered and informed consent obtained.    Details of the Procedure  The patient underwent monitored anesthesia care, which was administered by   an anesthesia professional. The patient's blood pressure, heart rate,   level of consciousness, oxygen saturation, respirations, ECG and ETCO2   were monitored throughout the procedure. A digital rectal exam was   performed. The scope was introduced through the anus and advanced to the   descending colon. Retroflexion was not performed due to inflammation. The   patient's estimated blood loss was  minimal. The procedure was not   difficult. The patient tolerated the procedure well. There were no   apparent adverse events.     Events  Procedure Events   Event Event Time   ENDO SCOPE IN TIME 6/26/2025  5:36 PM   ENDO SCOPE OUT TIME 6/26/2025  5:43 PM     Specimens  ID Type Source Tests Collected by Time   1 : R/O CMV, Colitis, HX of UC Tissue COLON - DESCENDING BIOPSY SURGICAL   PATHOLOGY EXAM Viir Elizalde MD 6/26/2025 1739     Procedure Location  [address] [address]  [phone]    Referring Provider  Viky May, APRN-CNP    Procedure Provider  MD Viky Garrett  Invasive vascular procedure     [facility], Cath Lab         [address] [place] [id number]    Cardiovascular Catheterization Report    Patient Name:      MERVIN DENNEY   Performing           [id number] Sidra Valles                                         Physician:           MD  Study Date:        6/25/2025           Verifying Physician: Adolfo Valles MD  MRN/PID:           [id number]            Cardiologist:  Accession#:        [id number]        Ordering Provider:   [id number] ASHLEY COLBERT  Date of Birth/Age: 1955 / 69 years Fellow:  Gender:            F                   Fellow:  Encounter#:        [id number]       Study: IVC - Inferior Vena Cava Filter       Procedure Description:  After infiltration with 2% Lidocaine, the was cannulated with a modified Seldinger technique. After infiltration of local anesthetic, the right femoral vein was cannulated with a percutaneous technique. A 7 Czech sheath was placed in the vein. Post-procedure, the venous sheath was pulled and pressure was applied to the site.     Peripheral Interventions:  IVC filter was placed under floroscopy guidance after  vennogram ws done no complication.     Hemo Personnel:  +---------------+---------+  Name           Duty       +---------------+---------+  Sidra Valles MD 1  +---------------+---------+       Cardiac Cath Post Procedure Notes:  Post Procedure Diagnosis: IVC filter placement.  Blood Loss:               Estimated blood loss during the procedure was 2 mls.  Specimens Removed:        Number of specimen(s) removed: none.    ____________________________________________________________________________________  CONCLUSIONS:   1. Successful placement of IVC filter.    ICD 10 Codes:  Personal history of thrombophlebitis-Z86.72     CPT Codes:  Venogram, IVC-04490; Insertion IVC filter-37310; Moderate Sedation Services 1st additional 15 minutes patient >5 years-73277; Moderate Sedation Services 2nd additional 15 minutes patient >5 years-[id number]     [id number] Sidra Valles MD  Performing Physician  Electronically signed by [id number] Valles MD on 6/25/2025 at 3:41:22 PM    ** Final **                    [1] [Held by provider] enoxaparin, 40 mg, subcutaneous, q24h  methylPREDNISolone sodium succinate (PF), 40 mg, intravenous, q24h  pantoprazole, 40 mg, intravenous, Daily     [2] PRN medications: HYDROmorphone, ondansetron ODT **OR** ondansetron, oxyCODONE  [3]

## 2025-06-30 LAB
ANION GAP SERPL CALC-SCNC: 9 MMOL/L (ref 10–20)
BASOPHILS # BLD AUTO: 0.01 X10*3/UL (ref 0–0.1)
BASOPHILS NFR BLD AUTO: 0.1 %
BUN SERPL-MCNC: 7 MG/DL (ref 6–23)
CALCIUM SERPL-MCNC: 7.8 MG/DL (ref 8.6–10.3)
CHLORIDE SERPL-SCNC: 106 MMOL/L (ref 98–107)
CO2 SERPL-SCNC: 26 MMOL/L (ref 21–32)
CREAT SERPL-MCNC: 0.57 MG/DL (ref 0.5–1.05)
CRP SERPL-MCNC: 7.35 MG/DL
EGFRCR SERPLBLD CKD-EPI 2021: >90 ML/MIN/1.73M*2
EOSINOPHIL # BLD AUTO: 0.02 X10*3/UL (ref 0–0.7)
EOSINOPHIL NFR BLD AUTO: 0.2 %
ERYTHROCYTE [DISTWIDTH] IN BLOOD BY AUTOMATED COUNT: 19.6 % (ref 11.5–14.5)
ERYTHROCYTE [SEDIMENTATION RATE] IN BLOOD BY WESTERGREN METHOD: 21 MM/H (ref 0–30)
GLUCOSE SERPL-MCNC: 79 MG/DL (ref 74–99)
HCT VFR BLD AUTO: 28.1 % (ref 36–46)
HGB BLD-MCNC: 8.6 G/DL (ref 12–16)
HOLD SPECIMEN: NORMAL
HOLD SPECIMEN: NORMAL
IMM GRANULOCYTES # BLD AUTO: 0.06 X10*3/UL (ref 0–0.7)
IMM GRANULOCYTES NFR BLD AUTO: 0.6 % (ref 0–0.9)
LYMPHOCYTES # BLD AUTO: 3.64 X10*3/UL (ref 1.2–4.8)
LYMPHOCYTES NFR BLD AUTO: 35.2 %
MCH RBC QN AUTO: 24.5 PG (ref 26–34)
MCHC RBC AUTO-ENTMCNC: 30.6 G/DL (ref 32–36)
MCV RBC AUTO: 80 FL (ref 80–100)
MONOCYTES # BLD AUTO: 1.16 X10*3/UL (ref 0.1–1)
MONOCYTES NFR BLD AUTO: 11.2 %
NEUTROPHILS # BLD AUTO: 5.44 X10*3/UL (ref 1.2–7.7)
NEUTROPHILS NFR BLD AUTO: 52.7 %
NRBC BLD-RTO: 0 /100 WBCS (ref 0–0)
PLATELET # BLD AUTO: 254 X10*3/UL (ref 150–450)
POTASSIUM SERPL-SCNC: 3.9 MMOL/L (ref 3.5–5.3)
RBC # BLD AUTO: 3.51 X10*6/UL (ref 4–5.2)
SODIUM SERPL-SCNC: 137 MMOL/L (ref 136–145)
WBC # BLD AUTO: 10.3 X10*3/UL (ref 4.4–11.3)

## 2025-06-30 PROCEDURE — 97110 THERAPEUTIC EXERCISES: CPT | Mod: GP,CQ

## 2025-06-30 PROCEDURE — 2500000004 HC RX 250 GENERAL PHARMACY W/ HCPCS (ALT 636 FOR OP/ED): Performed by: INTERNAL MEDICINE

## 2025-06-30 PROCEDURE — 99232 SBSQ HOSP IP/OBS MODERATE 35: CPT | Performed by: INTERNAL MEDICINE

## 2025-06-30 PROCEDURE — 1210000001 HC SEMI-PRIVATE ROOM DAILY

## 2025-06-30 PROCEDURE — 36415 COLL VENOUS BLD VENIPUNCTURE: CPT | Performed by: INTERNAL MEDICINE

## 2025-06-30 PROCEDURE — 86140 C-REACTIVE PROTEIN: CPT | Performed by: INTERNAL MEDICINE

## 2025-06-30 PROCEDURE — 80048 BASIC METABOLIC PNL TOTAL CA: CPT | Performed by: INTERNAL MEDICINE

## 2025-06-30 PROCEDURE — 85025 COMPLETE CBC W/AUTO DIFF WBC: CPT | Performed by: INTERNAL MEDICINE

## 2025-06-30 PROCEDURE — 85652 RBC SED RATE AUTOMATED: CPT | Performed by: INTERNAL MEDICINE

## 2025-06-30 PROCEDURE — 97530 THERAPEUTIC ACTIVITIES: CPT | Mod: GP,CQ

## 2025-06-30 RX ORDER — OXYCODONE HYDROCHLORIDE 5 MG/1
10 TABLET ORAL EVERY 4 HOURS PRN
Status: DISCONTINUED | OUTPATIENT
Start: 2025-06-30 | End: 2025-07-02

## 2025-06-30 RX ADMIN — ONDANSETRON 4 MG: 2 INJECTION INTRAMUSCULAR; INTRAVENOUS at 21:00

## 2025-06-30 RX ADMIN — PANTOPRAZOLE SODIUM 40 MG: 40 INJECTION, POWDER, LYOPHILIZED, FOR SOLUTION INTRAVENOUS at 09:47

## 2025-06-30 RX ADMIN — HYDROMORPHONE HYDROCHLORIDE 0.4 MG: 1 INJECTION, SOLUTION INTRAMUSCULAR; INTRAVENOUS; SUBCUTANEOUS at 20:59

## 2025-06-30 RX ADMIN — METHYLPREDNISOLONE SODIUM SUCCINATE 40 MG: 40 INJECTION, POWDER, FOR SOLUTION INTRAMUSCULAR; INTRAVENOUS at 09:47

## 2025-06-30 RX ADMIN — HYDROMORPHONE HYDROCHLORIDE 0.4 MG: 1 INJECTION, SOLUTION INTRAMUSCULAR; INTRAVENOUS; SUBCUTANEOUS at 06:26

## 2025-06-30 RX ADMIN — HYDROMORPHONE HYDROCHLORIDE 0.4 MG: 1 INJECTION, SOLUTION INTRAMUSCULAR; INTRAVENOUS; SUBCUTANEOUS at 01:56

## 2025-06-30 RX ADMIN — HYDROMORPHONE HYDROCHLORIDE 0.4 MG: 1 INJECTION, SOLUTION INTRAMUSCULAR; INTRAVENOUS; SUBCUTANEOUS at 11:16

## 2025-06-30 ASSESSMENT — COGNITIVE AND FUNCTIONAL STATUS - GENERAL
STANDING UP FROM CHAIR USING ARMS: A LOT
MOBILITY SCORE: 17
HELP NEEDED FOR BATHING: A LITTLE
MOVING TO AND FROM BED TO CHAIR: A LITTLE
DAILY ACTIVITIY SCORE: 19
CLIMB 3 TO 5 STEPS WITH RAILING: A LOT
MOBILITY SCORE: 17
PERSONAL GROOMING: A LITTLE
MOVING FROM LYING ON BACK TO SITTING ON SIDE OF FLAT BED WITH BEDRAILS: A LITTLE
CLIMB 3 TO 5 STEPS WITH RAILING: A LOT
DRESSING REGULAR LOWER BODY CLOTHING: A LITTLE
HELP NEEDED FOR BATHING: A LITTLE
STANDING UP FROM CHAIR USING ARMS: A LITTLE
MOVING TO AND FROM BED TO CHAIR: A LITTLE
DRESSING REGULAR LOWER BODY CLOTHING: A LITTLE
MOVING FROM LYING ON BACK TO SITTING ON SIDE OF FLAT BED WITH BEDRAILS: A LITTLE
WALKING IN HOSPITAL ROOM: TOTAL
WALKING IN HOSPITAL ROOM: A LITTLE
MOVING TO AND FROM BED TO CHAIR: A LOT
CLIMB 3 TO 5 STEPS WITH RAILING: TOTAL
TURNING FROM BACK TO SIDE WHILE IN FLAT BAD: A LITTLE
MOBILITY SCORE: 12
STANDING UP FROM CHAIR USING ARMS: A LITTLE
TURNING FROM BACK TO SIDE WHILE IN FLAT BAD: A LITTLE
DRESSING REGULAR UPPER BODY CLOTHING: A LITTLE
DAILY ACTIVITIY SCORE: 19
DRESSING REGULAR UPPER BODY CLOTHING: A LITTLE
WALKING IN HOSPITAL ROOM: A LITTLE
PERSONAL GROOMING: A LITTLE
TOILETING: A LITTLE
TOILETING: A LITTLE
TURNING FROM BACK TO SIDE WHILE IN FLAT BAD: A LITTLE
MOVING FROM LYING ON BACK TO SITTING ON SIDE OF FLAT BED WITH BEDRAILS: A LITTLE

## 2025-06-30 ASSESSMENT — PAIN SCALES - GENERAL
PAINLEVEL_OUTOF10: 3
PAINLEVEL_OUTOF10: 4
PAINLEVEL_OUTOF10: 0 - NO PAIN
PAINLEVEL_OUTOF10: 6
PAINLEVEL_OUTOF10: 9

## 2025-06-30 ASSESSMENT — PAIN - FUNCTIONAL ASSESSMENT
PAIN_FUNCTIONAL_ASSESSMENT: 0-10
PAIN_FUNCTIONAL_ASSESSMENT: 0-10
PAIN_FUNCTIONAL_ASSESSMENT: FLACC (FACE, LEGS, ACTIVITY, CRY, CONSOLABILITY)
PAIN_FUNCTIONAL_ASSESSMENT: 0-10
PAIN_FUNCTIONAL_ASSESSMENT: 0-10

## 2025-06-30 ASSESSMENT — PAIN DESCRIPTION - DESCRIPTORS: DESCRIPTORS: ACHING

## 2025-06-30 NOTE — PROGRESS NOTES
06/30/25 1236   Discharge Planning   Who is requesting discharge planning? Provider   Home or Post Acute Services Post acute facilities (Rehab/SNF/etc)   Type of Post Acute Facility Services Skilled nursing   Expected Discharge Disposition SNF   Does the patient need discharge transport arranged? Yes   RoundTrip coordination needed? Yes   Has discharge transport been arranged? No   Patient Choice   Provider Choice list and CMS website (https://medicare.gov/care-compare#search) for post-acute Quality and Resource Measure Data were provided and reviewed with: Patient   Patient / Family choosing to utilize agency / facility established prior to hospitalization Yes   Intensity of Service   Intensity of Service 0-30 min     Met with pt & reviewed SNF list from [facility] & offered freedom of choice. Pt chose 1. [facility] 2. [facility] 3. [facility] 4. [facility]. Ref sent . CT team to cont to follow for SNF placement.

## 2025-06-30 NOTE — PROGRESS NOTES
Physical Therapy    Physical Therapy Treatment    Patient Name: Leola Jennings  MRN: [id number]  Department: [facility]  Room: [address] [place] [address]-  Today's Date: 6/30/2025  Time Calculation  Start Time: 0809  Stop Time: 0840  Time Calculation (min): 31 min         Assessment/Plan   PT Assessment  PT Assessment Results: Decreased strength, Decreased range of motion, Decreased endurance, Impaired balance, Decreased mobility, Decreased coordination, Pain  Rehab Prognosis: Fair  Barriers to Discharge Home: Caregiver assistance, Physical needs  Caregiver Assistance: Patient lives alone and/or does not have reliable caregiver assistance  Physical Needs: 24hr mobility assistance needed, 24hr ADL assistance needed  End of Session Communication: Bedside nurse  Assessment Comment: Pt is pleasant and cooperative but needing redirection throughout to focus on tasks. Pt continues to require increased assistance with OOB activity with BLE buckling. Pt will benefit from continued PT to further progress strength and functional mobility.  End of Session Patient Position: Alarm on, Bed, 3 rail up  PT Plan  Inpatient/Swing Bed or Outpatient: Inpatient  PT Plan  Treatment/Interventions: Bed mobility, Transfer training, Gait training, Therapeutic exercise  PT Plan: Ongoing PT  PT Frequency: 4 times per week  PT Discharge Recommendations: Moderate intensity level of continued care, High intensity level of continued care  PT Recommended Transfer Status: Assist x1, Total assist    PT Visit Info:  PT Received On: 06/30/25     General Visit Information:   General  Reason for Referral: Impaired mobility  Referred By: Micaela OT/PT 6/25  Past Medical History Relevant to Rehab: celiacs, ulcerative colitis, RA, HLD, HTN  Family/Caregiver Present: No  Prior to Session Communication: Bedside nurse  Patient Position Received: Bed, 3 rail up, Alarm on  General Comment: Pt is pleasant and cooperative, agreeable to PT treatment. (Pt is very verbose and  needing re-direction to focus on tasks.)    Subjective   Precautions:  Precautions  Medical Precautions: Fall precautions            Objective   Pain:  Pain Assessment  Pain Assessment: 0-10  0-10 (Numeric) Pain Score: 4  Pain Type: Acute pain  Pain Location: Abdomen  Pain Orientation: Lower  Pain Descriptors: Aching  Cognition:  Cognition  Orientation Level: Oriented X4  Insight: Mild  Coordination:     Postural Control:     Extremity/Trunk Assessments:        Activity Tolerance:  Activity Tolerance  Endurance: Decreased tolerance for upright activites  Treatments:  Therapeutic Exercise  Therapeutic Exercise Performed: Yes (Supine AP, HS, abd and seated LAQ x 5-10ea BLE. Pt feels pain in low abdomen with ther ex. Pt states she is very active and does exercise multiple times during the day.)    Bed Mobility  Bed Mobility: Yes (Sup<>sit at SBA with use of bed rail with HOB elevated ~40 degrees.)    Ambulation/Gait Training  Ambulation/Gait Training Performed: Yes (Attempted ambulation with WW/gait belt and max A from bed to chair. Pt having difficulty taking steps and ultimately pivoting to chair. Pt reluctant to trial WW. Pt BLE buckling during attempt.)  Transfers  Transfer: Yes (Multiple sit<>stand transfers from bed to chair. Pt resistant to using WW. Prefers stand pivot. WW used from bed to chair and stand pivot used from chair back to bed. heavy max A with gait belt. Needing education for safety prefers to complete her way.)    Outcome Measures:  [facility] Basic Mobility  Turning from your back to your side while in a flat bed without using bedrails: A little  Moving from lying on your back to sitting on the side of a flat bed without using bedrails: A little  Moving to and from bed to chair (including a wheelchair): A lot  Standing up from a chair using your arms (e.g. wheelchair or bedside chair): A lot  To walk in hospital room: Total  Climbing 3-5 steps with railing: Total  Basic Mobility - Total Score:  12    Education Documentation  Mobility Training, taught by Rosalba Ga PTA at 6/30/2025 10:24 AM.  Learner: Patient  Readiness: Acceptance  Method: Explanation  Response: Needs Reinforcement, Verbalizes Understanding    Education Comments  No comments found.         EDUCATION:  Outpatient Education  Individual(s) Educated: Patient  Education Provided: Body Mechanics, Fall Risk, Home Exercise Program, Home Safety    Encounter Problems       Encounter Problems (Active)       PT Problem       Pt will demonstrate min A with bed mobility to edge of bed.   (Progressing)       Start:  06/27/25    Expected End:  07/11/25            Pt will demonstrate min A with sit to stand/chair transfers with FWW.   (Progressing)       Start:  06/27/25    Expected End:  07/11/25            Pt will ambulate 30 feet with FWW min A .   (Progressing)       Start:  06/27/25    Expected End:  07/11/25            Pt to demo improved BLE strength by being able to complete supine/seated thera ex 2x20 BLEs with 4 or less rest breaks .   (Progressing)       Start:  06/27/25    Expected End:  07/11/25               Pain - Adult

## 2025-06-30 NOTE — PROGRESS NOTES
Medical Group Progress Note  ASSESSMENT & PLAN:     Acute blood loss anemia, symptomatic  GI bleed  Ulcerative colitis  -having increased frequency of loose, bloody stool and associated abdominal pain  -hgb down to 6.5; baseline ~9-10  -transfusing 2uPRBC in ED  -CT abd/pel showing nonspecific mild colitis from the hepatic flexure down through the rectum probably consistent with her known UC  -has mostly been on steroids previously, has refused biologics  -looks like most recently she was on mesalamine with GI at [facility]  -start steroids  -GI consult  -clear liquid diet for now  -check ESR, CRP, calprotectin     Acute non-occlusive DVT  -involving the right extrarenal iliac vein, right common iliac vein, infrarenal IVC  -likely related to her IBD  -don't think we can fully anticoagulate at this point given her significant symptomatic anemia and GI bleeding  -will get formal bilateral duplex  -IVC filter in place, off anticoagulation  -once UC is under better control plan filter removal and initiation of DOAC     Hypokalemia  -replete     HTN  HLD  -hold antihypertensives for now              VTE Prophylaxis: held for now    6/30/25:  Hgb down a little; bleeding is improving  Abd pain stable  Tolerating full liquids, cont for now  Repeat inflammatory markers today  Cont with daily steroid  Plan to initiate biologic as outpt  Flex sig showing severe erythematous ulcerated mucosa in the descending colon, sigmoid colon, rectum; biopsies taken  IVC filter in place  Plan to remove IVC filter and start on anticoagulation when GI issues are under better control and hgb stable  Try to wean to oral pain meds  Once symptoms improved and on oral pain meds plan discharge to SNF with close outpt GI follow up      Juan Daniel Hinojosa MD    SUBJECTIVE     No overnight events. Pain about the same today. Per nursing stool with less blood. Pt thinks stool becoming a little more formed. No fever.     OBJECTIVE:     Last Recorded  Vitals:  Vitals:    06/29/25 0732 06/29/25 1620 06/29/25 2013 06/30/25 0747   BP: 138/65 151/76 137/71 153/67   BP Location:   Left arm    Patient Position:  Sitting Lying    Pulse: 99 91 83 96   Resp: 19 19 18    Temp: 36.9 °C (98.4 °F) 36.7 °C (98.1 °F) 36.8 °C (98.2 °F) 36.6 °C (97.9 °F)   TempSrc: Temporal  Temporal    SpO2: 90% 97% 96% 99%   Weight:       Height:         Last I/O:  No intake/output data recorded.    Physical Exam    GEN: ill appearing; pale  HEENT: NCAT, PERRLA, EOMI, moist mucous membranes  NECK: supple, no masses  CV: RRR, no m/r/g, no LE edema  LUNGS: CTAB, no w/r/c  ABD: soft, mild diffuse TTP without rebound or guarding  SKIN: no rashes  MSK; no gross deformities, normal joints; R groin site c/d/I no swelling  NEURO: A+Ox3, no FND  PSYCH: appropriate mood, affect    Inpatient Medications:  Scheduled Medications[1]    PRN Medications  PRN Medications[2]    Continuous Medications:  Continuous Medications[3]  LABS AND IMAGING:     Labs:  Results from last 7 days   Lab Units 06/30/25  0545 06/29/25  0538 06/28/25  0659   WBC AUTO x10*3/uL 10.3 9.1 12.0*   RBC AUTO x10*6/uL 3.51* 3.73* 3.85*   HEMOGLOBIN g/dL 8.6* 9.2* 9.4*   HEMATOCRIT % 28.1* 31.3* 31.0*   MCV fL 80 84 81   MCH pg 24.5* 24.7* 24.4*   MCHC g/dL 30.6* 29.4* 30.3*   RDW % 19.6* 20.0* 19.5*   PLATELETS AUTO x10*3/uL 254 228 263     Results from last 7 days   Lab Units 06/30/25  0545 06/29/25  0539 06/27/25  0646 06/26/25  0545 06/25/25  0531   SODIUM mmol/L 137 137 140 140 137   POTASSIUM mmol/L 3.9 3.8 3.3* 4.1 3.7   CHLORIDE mmol/L 106 107 108* 112* 109*   CO2 mmol/L 26 22 21 22 21   BUN mg/dL 7 6 12 9 8   CREATININE mg/dL 0.57 0.61 0.65 0.56 0.57   GLUCOSE mg/dL 79 87 87 123* 140*   PROTEIN TOTAL g/dL  --   --  6.3* 6.0* 6.5   CALCIUM mg/dL 7.8* 7.7* 7.9* 8.0* 7.9*   BILIRUBIN TOTAL mg/dL  --   --  0.5 0.4 0.6   ALK PHOS U/L  --   --  76 72 76   AST U/L  --   --  12 11 10   ALT U/L  --   --  10 8 8           Results from last  7 days   Lab Units 06/24/25  1344   TROPHS ng/L 6     Imaging:  Flexible Sigmoidoscopy  Table formatting from the original result was not included.  Impression  Severe erythematous, ulcerated mucosa in the descending colon, sigmoid   colon and rectum in continuous fashion consistent with Smith Grade 3   colitis. Performed cold forceps biopsy to rule out superimposed CMV.     Findings  Severe, continuous erythematous and ulcerated mucosa in the descending   colon, sigmoid colon and rectum; performed cold forceps biopsy       Recommendation  Await pathology results   Follow up with primary gastroenterologist   Continue IV solumedrol, trend daily CRP  Complete biologic workup if not recently updated         Indication  Abnormal findings on diagnostic imaging of other parts of digestive tract,   Ulcerative colitis with complication, unspecified location (Multi)    Post-Op Diagnosis  None    Staff  Staff Role   Viri Elizalde MD Proceduralist     Medications  See Anesthesia Record.     Preprocedure  A history and physical has been performed, and patient medication   allergies have been reviewed. The patient's tolerance of previous   anesthesia has been reviewed. The risks and benefits of the procedure and   the sedation options and risks were discussed with the patient. All   questions were answered and informed consent obtained.    Details of the Procedure  The patient underwent monitored anesthesia care, which was administered by   an anesthesia professional. The patient's blood pressure, heart rate,   level of consciousness, oxygen saturation, respirations, ECG and ETCO2   were monitored throughout the procedure. A digital rectal exam was   performed. The scope was introduced through the anus and advanced to the   descending colon. Retroflexion was not performed due to inflammation. The   patient's estimated blood loss was minimal. The procedure was not   difficult. The patient tolerated the procedure well. There  were no   apparent adverse events.     Events  Procedure Events   Event Event Time   ENDO SCOPE IN TIME 6/26/2025  5:36 PM   ENDO SCOPE OUT TIME 6/26/2025  5:43 PM     Specimens  ID Type Source Tests Collected by Time   1 : R/O CMV, Colitis, HX of UC Tissue COLON - DESCENDING BIOPSY SURGICAL   PATHOLOGY EXAM Viri Elizalde MD 6/26/2025 1739     Procedure Location  [address] [address]  [phone]    Referring Provider  Viky May, APRN-CNP    Procedure Provider  MD Viky Garrett  Invasive vascular procedure     [facility], Cath Lab         [address] [place] [id number]    Cardiovascular Catheterization Report    Patient Name:      MERVIN DENNEY   Performing           [id number] Valles                                         Physician:           MD  Study Date:        6/25/2025           Verifying Physician: Adolfo Valles MD  MRN/PID:           [id number]            Cardiologist:  Accession#:        [id number]        Ordering Provider:   [id number] ASHLEY COLBERT  Date of Birth/Age: 1955 / 69 years Fellow:  Gender:            F                   Fellow:  Encounter#:        [id number]       Study: IVC - Inferior Vena Cava Filter       Procedure Description:  After infiltration with 2% Lidocaine, the was cannulated with a modified Seldinger technique. After infiltration of local anesthetic, the right femoral vein was cannulated with a percutaneous technique. A 7 English sheath was placed in the vein. Post-procedure, the venous sheath was pulled and pressure was applied to the site.     Peripheral Interventions:  IVC filter was placed under floroscopy guidance after vennogram ws done no complication.     Hemo Personnel:  +---------------+---------+  Name            Duty       +---------------+---------+  Sidra Valles MD 1  +---------------+---------+       Cardiac Cath Post Procedure Notes:  Post Procedure Diagnosis: IVC filter placement.  Blood Loss:               Estimated blood loss during the procedure was 2 mls.  Specimens Removed:        Number of specimen(s) removed: none.    ____________________________________________________________________________________  CONCLUSIONS:   1. Successful placement of IVC filter.    ICD 10 Codes:  Personal history of thrombophlebitis-Z86.72     CPT Codes:  Venogram, IVC-99096; Insertion IVC filter-93456; Moderate Sedation Services 1st additional 15 minutes patient >5 years-03627; Moderate Sedation Services 2nd additional 15 minutes patient >5 years-85898     [id number] Sidra Valles MD  Performing Physician  Electronically signed by [id number] Valles MD on 6/25/2025 at 3:41:22 PM    ** Final **                      [1] [Held by provider] enoxaparin, 40 mg, subcutaneous, q24h  methylPREDNISolone sodium succinate (PF), 40 mg, intravenous, q24h  pantoprazole, 40 mg, intravenous, Daily     [2] PRN medications: HYDROmorphone, ondansetron ODT **OR** ondansetron, oxyCODONE  [3]

## 2025-07-01 LAB
BASOPHILS # BLD AUTO: 0.04 X10*3/UL (ref 0–0.1)
BASOPHILS NFR BLD AUTO: 0.3 %
CALPROTECTIN STL-MCNT: >3000 UG/G
EOSINOPHIL # BLD AUTO: 0.02 X10*3/UL (ref 0–0.7)
EOSINOPHIL NFR BLD AUTO: 0.1 %
ERYTHROCYTE [DISTWIDTH] IN BLOOD BY AUTOMATED COUNT: 19.9 % (ref 11.5–14.5)
HCT VFR BLD AUTO: 35.3 % (ref 36–46)
HGB BLD-MCNC: 10.8 G/DL (ref 12–16)
IMM GRANULOCYTES # BLD AUTO: 0.11 X10*3/UL (ref 0–0.7)
IMM GRANULOCYTES NFR BLD AUTO: 0.7 % (ref 0–0.9)
LYMPHOCYTES # BLD AUTO: 4.34 X10*3/UL (ref 1.2–4.8)
LYMPHOCYTES NFR BLD AUTO: 27.7 %
MCH RBC QN AUTO: 24.5 PG (ref 26–34)
MCHC RBC AUTO-ENTMCNC: 30.6 G/DL (ref 32–36)
MCV RBC AUTO: 80 FL (ref 80–100)
MONOCYTES # BLD AUTO: 1.25 X10*3/UL (ref 0.1–1)
MONOCYTES NFR BLD AUTO: 8 %
NEUTROPHILS # BLD AUTO: 9.88 X10*3/UL (ref 1.2–7.7)
NEUTROPHILS NFR BLD AUTO: 63.2 %
NRBC BLD-RTO: 0 /100 WBCS (ref 0–0)
PLATELET # BLD AUTO: 341 X10*3/UL (ref 150–450)
RBC # BLD AUTO: 4.4 X10*6/UL (ref 4–5.2)
WBC # BLD AUTO: 15.6 X10*3/UL (ref 4.4–11.3)

## 2025-07-01 PROCEDURE — 2500000005 HC RX 250 GENERAL PHARMACY W/O HCPCS: Performed by: STUDENT IN AN ORGANIZED HEALTH CARE EDUCATION/TRAINING PROGRAM

## 2025-07-01 PROCEDURE — 85025 COMPLETE CBC W/AUTO DIFF WBC: CPT | Performed by: INTERNAL MEDICINE

## 2025-07-01 PROCEDURE — 2500000004 HC RX 250 GENERAL PHARMACY W/ HCPCS (ALT 636 FOR OP/ED): Mod: JZ | Performed by: INTERNAL MEDICINE

## 2025-07-01 PROCEDURE — 36415 COLL VENOUS BLD VENIPUNCTURE: CPT | Performed by: INTERNAL MEDICINE

## 2025-07-01 PROCEDURE — 2500000004 HC RX 250 GENERAL PHARMACY W/ HCPCS (ALT 636 FOR OP/ED): Performed by: INTERNAL MEDICINE

## 2025-07-01 PROCEDURE — 97535 SELF CARE MNGMENT TRAINING: CPT | Mod: GO,CO

## 2025-07-01 PROCEDURE — 2500000001 HC RX 250 WO HCPCS SELF ADMINISTERED DRUGS (ALT 637 FOR MEDICARE OP): Performed by: INTERNAL MEDICINE

## 2025-07-01 PROCEDURE — 97530 THERAPEUTIC ACTIVITIES: CPT | Mod: GP,CQ

## 2025-07-01 PROCEDURE — 99232 SBSQ HOSP IP/OBS MODERATE 35: CPT | Performed by: STUDENT IN AN ORGANIZED HEALTH CARE EDUCATION/TRAINING PROGRAM

## 2025-07-01 PROCEDURE — 1210000001 HC SEMI-PRIVATE ROOM DAILY

## 2025-07-01 RX ADMIN — ONDANSETRON 4 MG: 2 INJECTION INTRAMUSCULAR; INTRAVENOUS at 09:39

## 2025-07-01 RX ADMIN — ONDANSETRON 4 MG: 4 TABLET, ORALLY DISINTEGRATING ORAL at 18:14

## 2025-07-01 RX ADMIN — HYDROMORPHONE HYDROCHLORIDE 0.4 MG: 1 INJECTION, SOLUTION INTRAMUSCULAR; INTRAVENOUS; SUBCUTANEOUS at 22:02

## 2025-07-01 RX ADMIN — PANTOPRAZOLE SODIUM 40 MG: 40 INJECTION, POWDER, LYOPHILIZED, FOR SOLUTION INTRAVENOUS at 09:36

## 2025-07-01 RX ADMIN — HYDROMORPHONE HYDROCHLORIDE 0.4 MG: 1 INJECTION, SOLUTION INTRAMUSCULAR; INTRAVENOUS; SUBCUTANEOUS at 09:37

## 2025-07-01 RX ADMIN — HYDROMORPHONE HYDROCHLORIDE 0.4 MG: 1 INJECTION, SOLUTION INTRAMUSCULAR; INTRAVENOUS; SUBCUTANEOUS at 05:00

## 2025-07-01 RX ADMIN — OXYCODONE HYDROCHLORIDE 10 MG: 5 TABLET ORAL at 16:15

## 2025-07-01 RX ADMIN — CARBOXYMETHYLCELLULOSE SODIUM 2 DROP: 5 SOLUTION/ DROPS OPHTHALMIC at 18:14

## 2025-07-01 RX ADMIN — OXYCODONE HYDROCHLORIDE 10 MG: 5 TABLET ORAL at 00:30

## 2025-07-01 RX ADMIN — METHYLPREDNISOLONE SODIUM SUCCINATE 40 MG: 40 INJECTION, POWDER, FOR SOLUTION INTRAMUSCULAR; INTRAVENOUS at 09:36

## 2025-07-01 ASSESSMENT — COGNITIVE AND FUNCTIONAL STATUS - GENERAL
DRESSING REGULAR UPPER BODY CLOTHING: A LITTLE
WALKING IN HOSPITAL ROOM: A LITTLE
MOVING TO AND FROM BED TO CHAIR: A LITTLE
EATING MEALS: A LITTLE
HELP NEEDED FOR BATHING: A LITTLE
DAILY ACTIVITIY SCORE: 15
MOVING TO AND FROM BED TO CHAIR: A LOT
HELP NEEDED FOR BATHING: A LOT
DRESSING REGULAR UPPER BODY CLOTHING: A LITTLE
DRESSING REGULAR LOWER BODY CLOTHING: A LOT
MOBILITY SCORE: 18
DRESSING REGULAR UPPER BODY CLOTHING: A LITTLE
PERSONAL GROOMING: A LITTLE
MOBILITY SCORE: 18
MOVING TO AND FROM BED TO CHAIR: A LITTLE
TOILETING: A LITTLE
CLIMB 3 TO 5 STEPS WITH RAILING: TOTAL
CLIMB 3 TO 5 STEPS WITH RAILING: A LOT
TURNING FROM BACK TO SIDE WHILE IN FLAT BAD: A LITTLE
STANDING UP FROM CHAIR USING ARMS: A LITTLE
PERSONAL GROOMING: A LITTLE
PERSONAL GROOMING: A LITTLE
MOBILITY SCORE: 13
STANDING UP FROM CHAIR USING ARMS: A LOT
WALKING IN HOSPITAL ROOM: A LOT
STANDING UP FROM CHAIR USING ARMS: A LITTLE
DRESSING REGULAR LOWER BODY CLOTHING: A LITTLE
TURNING FROM BACK TO SIDE WHILE IN FLAT BAD: A LITTLE
DAILY ACTIVITIY SCORE: 19
TOILETING: A LITTLE
HELP NEEDED FOR BATHING: A LITTLE
CLIMB 3 TO 5 STEPS WITH RAILING: A LOT
DRESSING REGULAR LOWER BODY CLOTHING: A LITTLE
WALKING IN HOSPITAL ROOM: A LITTLE
TOILETING: A LOT
MOVING FROM LYING ON BACK TO SITTING ON SIDE OF FLAT BED WITH BEDRAILS: A LITTLE
DAILY ACTIVITIY SCORE: 19
TURNING FROM BACK TO SIDE WHILE IN FLAT BAD: A LITTLE

## 2025-07-01 ASSESSMENT — PAIN SCALES - GENERAL
PAINLEVEL_OUTOF10: 8
PAINLEVEL_OUTOF10: 0 - NO PAIN
PAINLEVEL_OUTOF10: 5 - MODERATE PAIN
PAINLEVEL_OUTOF10: 8
PAINLEVEL_OUTOF10: 3
PAINLEVEL_OUTOF10: 3
PAINLEVEL_OUTOF10: 0 - NO PAIN
PAINLEVEL_OUTOF10: 3
PAINLEVEL_OUTOF10: 6
PAINLEVEL_OUTOF10: 7
PAINLEVEL_OUTOF10: 0 - NO PAIN

## 2025-07-01 ASSESSMENT — PAIN DESCRIPTION - LOCATION
LOCATION: ABDOMEN

## 2025-07-01 ASSESSMENT — PAIN - FUNCTIONAL ASSESSMENT
PAIN_FUNCTIONAL_ASSESSMENT: 0-10

## 2025-07-01 ASSESSMENT — ACTIVITIES OF DAILY LIVING (ADL): HOME_MANAGEMENT_TIME_ENTRY: 14

## 2025-07-01 NOTE — PROGRESS NOTES
Medical Group Progress Note  ASSESSMENT & PLAN:     Acute blood loss anemia, symptomatic  GI bleed  Ulcerative colitis  -having increased frequency of loose, bloody stool and associated abdominal pain  -hgb down to 6.5; baseline ~9-10  -transfusing 2uPRBC in ED  -CT abd/pel showing nonspecific mild colitis from the hepatic flexure down through the rectum probably consistent with her known UC  -has mostly been on steroids previously, has refused biologics  -looks like most recently she was on mesalamine with GI at [facility]  -start steroids  -GI consult  -clear liquid diet for now  -check ESR, CRP, calprotectin     Acute non-occlusive DVT  -involving the right extrarenal iliac vein, right common iliac vein, infrarenal IVC  -likely related to her IBD  -don't think we can fully anticoagulate at this point given her significant symptomatic anemia and GI bleeding  -will get formal bilateral duplex  -IVC filter in place, off anticoagulation  -once UC is under better control plan filter removal and initiation of DOAC     Hypokalemia  -replete     HTN  HLD  -hold antihypertensives for now              VTE Prophylaxis: held for now    7/1/25:  H/H has improved, Hgb 10.8 today  Abd pain stable  Diet advanced to Regular soft & bite sized  Repeat inflammatory markers improving, ESR WNL  Cont with daily steroid  Plan to initiate biologic as outpt  Flex sig showing severe erythematous ulcerated mucosa in the descending colon, sigmoid colon, rectum; biopsies taken  IVC filter in place  Plan to remove IVC filter and start on anticoagulation when GI issues are under better control and hgb stable  Try to wean to oral pain meds    Medically ready for discharge to SNF with close outpt GI follow up      Joaquín Erickson,     SUBJECTIVE     No overnight events.    OBJECTIVE:     Last Recorded Vitals:  Vitals:    06/30/25 0747 06/30/25 1613 06/30/25 2016 07/01/25 0807   BP: 153/67 168/72 152/79 134/64   BP Location:  Left arm Left arm  Left arm   Patient Position:  Sitting Lying Lying   Pulse: 96 89 89 88   Resp:  18 18 18   Temp: 36.6 °C (97.9 °F) 37.2 °C (99 °F) 36.7 °C (98.1 °F) 37.2 °C (99 °F)   TempSrc:  Temporal Temporal Temporal   SpO2: 99% 97% 95% 98%   Weight:       Height:         Last I/O:  No intake/output data recorded.    Physical Exam    GEN: ill appearing; pale  HEENT: NCAT, PERRLA, EOMI, moist mucous membranes  NECK: supple, no masses  CV: RRR, no m/r/g, no LE edema  LUNGS: CTAB, no w/r/c  ABD: soft, mild diffuse TTP without rebound or guarding  SKIN: no rashes  MSK; no gross deformities, normal joints; R groin site c/d/I no swelling  NEURO: A+Ox3, no FND  PSYCH: appropriate mood, affect    Inpatient Medications:  Scheduled Medications[1]    PRN Medications  PRN Medications[2]    Continuous Medications:  Continuous Medications[3]  LABS AND IMAGING:     Labs:  Results from last 7 days   Lab Units 07/01/25  0906 06/30/25  0545 06/29/25  0538   WBC AUTO x10*3/uL 15.6* 10.3 9.1   RBC AUTO x10*6/uL 4.40 3.51* 3.73*   HEMOGLOBIN g/dL 10.8* 8.6* 9.2*   HEMATOCRIT % 35.3* 28.1* 31.3*   MCV fL 80 80 84   MCH pg 24.5* 24.5* 24.7*   MCHC g/dL 30.6* 30.6* 29.4*   RDW % 19.9* 19.6* 20.0*   PLATELETS AUTO x10*3/uL 341 254 228     Results from last 7 days   Lab Units 06/30/25  0545 06/29/25  0539 06/27/25  0646 06/26/25  0545 06/25/25  0531   SODIUM mmol/L 137 137 140 140 137   POTASSIUM mmol/L 3.9 3.8 3.3* 4.1 3.7   CHLORIDE mmol/L 106 107 108* 112* 109*   CO2 mmol/L 26 22 21 22 21   BUN mg/dL 7 6 12 9 8   CREATININE mg/dL 0.57 0.61 0.65 0.56 0.57   GLUCOSE mg/dL 79 87 87 123* 140*   PROTEIN TOTAL g/dL  --   --  6.3* 6.0* 6.5   CALCIUM mg/dL 7.8* 7.7* 7.9* 8.0* 7.9*   BILIRUBIN TOTAL mg/dL  --   --  0.5 0.4 0.6   ALK PHOS U/L  --   --  76 72 76   AST U/L  --   --  12 11 10   ALT U/L  --   --  10 8 8                 Imaging:  Flexible Sigmoidoscopy  Table formatting from the original result was not included.  Impression  Severe erythematous,  ulcerated mucosa in the descending colon, sigmoid   colon and rectum in continuous fashion consistent with Smith Grade 3   colitis. Performed cold forceps biopsy to rule out superimposed CMV.     Findings  Severe, continuous erythematous and ulcerated mucosa in the descending   colon, sigmoid colon and rectum; performed cold forceps biopsy       Recommendation  Await pathology results   Follow up with primary gastroenterologist   Continue IV solumedrol, trend daily CRP  Complete biologic workup if not recently updated         Indication  Abnormal findings on diagnostic imaging of other parts of digestive tract,   Ulcerative colitis with complication, unspecified location (Multi)    Post-Op Diagnosis  None    Staff  Staff Role   Viri Elizalde MD Proceduralist     Medications  See Anesthesia Record.     Preprocedure  A history and physical has been performed, and patient medication   allergies have been reviewed. The patient's tolerance of previous   anesthesia has been reviewed. The risks and benefits of the procedure and   the sedation options and risks were discussed with the patient. All   questions were answered and informed consent obtained.    Details of the Procedure  The patient underwent monitored anesthesia care, which was administered by   an anesthesia professional. The patient's blood pressure, heart rate,   level of consciousness, oxygen saturation, respirations, ECG and ETCO2   were monitored throughout the procedure. A digital rectal exam was   performed. The scope was introduced through the anus and advanced to the   descending colon. Retroflexion was not performed due to inflammation. The   patient's estimated blood loss was minimal. The procedure was not   difficult. The patient tolerated the procedure well. There were no   apparent adverse events.     Events  Procedure Events   Event Event Time   ENDO SCOPE IN TIME 6/26/2025  5:36 PM   ENDO SCOPE OUT TIME 6/26/2025  5:43 PM     Specimens  ID  Type Source Tests Collected by Time   1 : R/O CMV, Colitis, HX of UC Tissue COLON - DESCENDING BIOPSY SURGICAL   PATHOLOGY EXAM Viri Elizalde MD 6/26/2025 1739     Procedure Location  [address] [id number] [phone]    Referring Provider  Viky May, APRN-CNP    Procedure Provider  MD Viky Garrett  Invasive vascular procedure     [facility], Cath Lab         [address] [place] [id number]    Cardiovascular Catheterization Report    Patient Name:      MERVIN DENNEY   Performing           [id number] Sidra Valles                                         Physician:           MD  Study Date:        6/25/2025           Verifying Physician: Adolfo Valles MD  MRN/PID:           [id number]            Cardiologist:  Accession#:        [id number]        Ordering Provider:   [id number] ASHLEY COLBERT  Date of Birth/Age: 1955 / 69 years Fellow:  Gender:            F                   Fellow:  Encounter#:        [id number]       Study: IVC - Inferior Vena Cava Filter       Procedure Description:  After infiltration with 2% Lidocaine, the was cannulated with a modified Seldinger technique. After infiltration of local anesthetic, the right femoral vein was cannulated with a percutaneous technique. A 7 Romanian sheath was placed in the vein. Post-procedure, the venous sheath was pulled and pressure was applied to the site.     Peripheral Interventions:  IVC filter was placed under floroscopy guidance after vennogram ws done no complication.     Hemo Personnel:  +---------------+---------+  Name           Duty       +---------------+---------+  Sidra Valles MD 1  +---------------+---------+       Cardiac Cath Post Procedure Notes:  Post Procedure Diagnosis: IVC filter  placement.  Blood Loss:               Estimated blood loss during the procedure was 2 mls.  Specimens Removed:        Number of specimen(s) removed: none.    ____________________________________________________________________________________  CONCLUSIONS:   1. Successful placement of IVC filter.    ICD 10 Codes:  Personal history of thrombophlebitis-Z86.72     CPT Codes:  Venogram, IVC-19563; Insertion IVC filter-04617; Moderate Sedation Services 1st additional 15 minutes patient >5 years-74017; Moderate Sedation Services 2nd additional 15 minutes patient >5 years-66773     [id number] Sidra Valles MD  Performing Physician  Electronically signed by [id number] Valles MD on 6/25/2025 at 3:41:22 PM    ** Final **                        [1] [Held by provider] enoxaparin, 40 mg, subcutaneous, q24h  methylPREDNISolone sodium succinate (PF), 40 mg, intravenous, q24h  pantoprazole, 40 mg, intravenous, Daily     [2] PRN medications: HYDROmorphone, lubricating eye drops, ondansetron ODT **OR** ondansetron, oxyCODONE  [3]

## 2025-07-01 NOTE — CARE PLAN
The patient's goals for the shift include      The clinical goals for the shift include Patient will have pain control throughout the shift    Over the shift, the patient did not make progress toward the following goals. Barriers to progression include . Recommendations to address these barriers include .

## 2025-07-01 NOTE — CARE PLAN
The patient's goals for the shift include      The clinical goals for the shift include Patient will have pain control throughout the shift      Problem: Pain - Adult  Goal: Verbalizes/displays adequate comfort level or baseline comfort level  Outcome: Progressing     Problem: Chronic Conditions and Co-morbidities  Goal: Patient's chronic conditions and co-morbidity symptoms are monitored and maintained or improved  Outcome: Progressing     Problem: Nutrition  Goal: Nutrient intake appropriate for maintaining nutritional needs  Outcome: Progressing     Problem: Discharge Planning  Goal: Discharge to home or other facility with appropriate resources  Outcome: Progressing

## 2025-07-01 NOTE — PROGRESS NOTES
Physical Therapy    Physical Therapy Treatment    Patient Name: Leola Jennings  MRN: [id number]  Department: [facility]  Room: [facility]1103-A  Today's Date: 7/1/2025  Time Calculation  Start Time: 0829  Stop Time: 0855  Time Calculation (min): 26 min         Assessment/Plan   PT Assessment  PT Assessment Results: Decreased strength, Decreased range of motion, Decreased endurance, Impaired balance, Decreased mobility, Decreased coordination, Pain  Rehab Prognosis: Fair  Barriers to Discharge Home: Caregiver assistance, Physical needs  Caregiver Assistance: Patient lives alone and/or does not have reliable caregiver assistance  Physical Needs: 24hr mobility assistance needed, 24hr ADL assistance needed  End of Session Communication: Bedside nurse  Assessment Comment: Pt continues to require increased assistance with all functional tasks. Pt deconditioned and lacks insight into deficits. Pt will benefit from continued PT to further progress strength, endurance and functional mobility.  End of Session Patient Position: Up in chair, Alarm on  PT Plan  Inpatient/Swing Bed or Outpatient: Inpatient  PT Plan  Treatment/Interventions: Bed mobility, Transfer training, Gait training  PT Plan: Ongoing PT  PT Frequency: 4 times per week  PT Discharge Recommendations: Moderate intensity level of continued care, High intensity level of continued care  PT Recommended Transfer Status: Assist x1, Total assist    PT Visit Info:  PT Received On: 07/01/25     General Visit Information:   General  Reason for Referral: Impaired mobility  Referred By: Micaela OT/PT 6/25  Past Medical History Relevant to Rehab: celiacs, ulcerative colitis, RA, HLD, HTN  Co-Treatment: OT  Co-Treatment Reason: Due to pt's significant debility for optimal safety and therapy session  Prior to Session Communication: Bedside nurse  Patient Position Received: Bed, 3 rail up, Alarm on  General Comment: Pt cooperative to work with PT with encouragement.    Subjective    Precautions:  Precautions  Medical Precautions: Fall precautions     Date/Time Vitals Session Patient Position Pulse Resp SpO2 BP MAP (mmHg)    07/01/25 08:07:36 --  --  88  18  98 %  134/64  92                 Objective   Pain:  Pain Assessment  Pain Assessment: 0-10  0-10 (Numeric) Pain Score:  (Does not quantify with a number.)  Pain Type: Acute pain  Pain Location: Abdomen  Pain Interventions: Medication (See MAR) (Per nursing she will bring the pt pain meds.)  Cognition:  Cognition  Orientation Level: Oriented X4  Insight: Moderate  Impulsive: Moderately  Coordination:     Postural Control:     Extremity/Trunk Assessments:        Activity Tolerance:  Activity Tolerance  Endurance: Decreased tolerance for upright activites  Treatments:  Bed Mobility  Bed Mobility: Yes (Sup>sit with CGA and HOB elevated with use of bed rail for support. Pt taking increased time with slow movements.)    Ambulation/Gait Training  Ambulation/Gait Training Performed: Yes (Pt taking ~4-5 steps from BSC to chair with WW/gait belt and max A x2. Pt's BLE buckling throughout and pt following one step commands. Impulsive and anxious throughout session.)  Transfers  Transfer: Yes (Multiple sit<>stand transfers from EOB, BSC and chair with mod A x 2 with gait belt/WW. Pt BLE buckle and she is impulsive needing max cues for technique/hand placement. Pt attempts to hold front of WW vs .)    Outcome Measures:  [facility] Basic Mobility  Turning from your back to your side while in a flat bed without using bedrails: A little  Moving from lying on your back to sitting on the side of a flat bed without using bedrails: A little  Moving to and from bed to chair (including a wheelchair): A lot  Standing up from a chair using your arms (e.g. wheelchair or bedside chair): A lot  To walk in hospital room: A lot  Climbing 3-5 steps with railing: Total  Basic Mobility - Total Score: 13    Education Documentation  Mobility Training, taught by Rosalba GREY  TOMAS aG at 7/1/2025  9:18 AM.  Learner: Patient  Readiness: Acceptance  Method: Explanation  Response: Needs Reinforcement    Education Comments  No comments found.        EDUCATION:  Outpatient Education  Individual(s) Educated: Patient  Education Provided: Body Mechanics, Fall Risk, Home Exercise Program, Home Safety    Encounter Problems       Encounter Problems (Active)       PT Problem       Pt will demonstrate min A with bed mobility to edge of bed.   (Progressing)       Start:  06/27/25    Expected End:  07/11/25            Pt will demonstrate min A with sit to stand/chair transfers with FWW.   (Progressing)       Start:  06/27/25    Expected End:  07/11/25            Pt will ambulate 30 feet with FWW min A .   (Progressing)       Start:  06/27/25    Expected End:  07/11/25            Pt to demo improved BLE strength by being able to complete supine/seated thera ex 2x20 BLEs with 4 or less rest breaks .   (Progressing)       Start:  06/27/25    Expected End:  07/11/25               Pain - Adult

## 2025-07-01 NOTE — PROGRESS NOTES
07/01/25 1454   Discharge Planning   Home or Post Acute Services Post acute facilities (Rehab/SNF/etc)   Type of Post Acute Facility Services Skilled nursing   Expected Discharge Disposition SNF  ([facility])   Does the patient need discharge transport arranged? Yes   [facility] coordination needed? Yes   Has discharge transport been arranged? No   Patient Choice   Provider Choice list and CMS website (https://medicare.gov/care-compare#search) for post-acute Quality and Resource Measure Data were provided and reviewed with: Patient   Intensity of Service   Intensity of Service 0-30 min     Requested facility start precert. Updated PT/OT notes attached.

## 2025-07-01 NOTE — PROGRESS NOTES
Occupational Therapy    OT Treatment    Patient Name: Leola Jennings  MRN: [id number]  Department: [facility]  Room: [facility]/1103-A  Today's Date: 7/1/2025  Time Calculation  Start Time: 0828  Stop Time: 0856  Time Calculation (min): 28 min        Assessment:  OT Assessment: Pt presents with decreased strength, balance, and endurance which impact her ADL and functional transfer status. Pt would benefit from skilled OT to address deficits.  Prognosis: Good  Barriers to Discharge Home: Caregiver assistance, Physical needs  Caregiver Assistance: Patient lives alone and/or does not have reliable caregiver assistance  Physical Needs: 24hr mobility assistance needed, 24hr ADL assistance needed, High falls risk due to function or environment  Evaluation/Treatment Tolerance: Patient limited by pain, Patient limited by fatigue  Medical Staff Made Aware: Yes  End of Session Communication: Bedside nurse  End of Session Patient Position: Up in chair, Alarm on (all needs met, call light within reach.  LE's in dependent position per pt request.)  Prognosis: Good  Evaluation/Treatment Tolerance: Patient limited by pain, Patient limited by fatigue  Medical Staff Made Aware: Yes  Plan:  Treatment Interventions: ADL retraining, Functional transfer training, Endurance training, Compensatory technique education  OT Frequency: 3 times per week  OT Discharge Recommendations: High intensity level of continued care  OT Recommended Transfer Status: Maximum assist, Assist of 2  OT - OK to Discharge: Yes (OT evaluation complete. Refer to MD for discharge.)  Treatment Interventions: ADL retraining, Functional transfer training, Endurance training, Compensatory technique education    Subjective   OT Visit Info:  OT Received On: 07/01/25  General Visit Info:  General  Reason for Referral: Dx: blood loss, colitis, illiac vein thrombosis, hypokalemia  Past Medical History Relevant to Rehab: celiacs, ulcerative colitis, RA, HLD, HTN  Co-Treatment:  PT  Co-Treatment Reason: Due to pt's significant debility for optimal safety and therapy session  Prior to Session Communication: Bedside nurse (cleared for participation in OT tx)  Patient Position Received: Bed, 3 rail up, Alarm on  General Comment: Pt agreeable with encouragement. Pt with c/o nausea and fatigue  Precautions:  Medical Precautions: Fall precautions    Pain:  Pain Assessment  Pain Assessment: 0-10  0-10 (Numeric) Pain Score:  (Generalized pain, > lower abd- not rated)    Objective    Cognition:  Cognition  Overall Cognitive Status: Impaired (anxious, difficult to redirect at times)  Arousal/Alertness: Delayed responses to stimuli  Orientation Level: Oriented X4  Following Commands: Follows multistep commands with increased time  Safety Judgment: Decreased awareness of need for assistance  Problem Solving: Assistance required to identify errors made  Problem Solving: Exceptions to WFL  Safety/Judgement: Exceptions to WFL  Insight: Moderate  Impulsive: Moderately       Activities of Daily Living: LE Dressing  LE Dressing:  (Don socks in long sit position with s/u and SBA)    Toileting  Toileting Comments: Pt completed anterior /posterior pericare while seated on BSC with SBA and cues for continuation       Bed Mobility/Transfers: Bed Mobility 1  Bed Mobility Comments 1: supine to sit transfer, CGA with HOB ~40° and use of bed rail to lift trunk    Transfer 1  Trials/Comments 1: STS transfer from bed level x 2 and bsc x 1 trial(s) with mod-max assist x 2 with use of gait belt and fww for support.  Knees in flexed position with frequent buckling. Bed->bsc->chair stand pivot transfer with max assist x 2 with fww.  Gait belt used for safety, assist for manuevering ww.  Max cues for upright posture    Sitting Balance:  Static Sitting Balance  Static Sitting-Comment/Number of Minutes: good-  Dynamic Sitting Balance  Dynamic Sitting-Comments: fair+  Standing Balance:  Static Standing Balance  Static  Standing-Comment/Number of Minutes: poor+ with (B) UE support  Dynamic Standing Balance  Dynamic Standing-Comments: poor       Therapy/Activity:      Balance/Neuromuscular Re-Education  Balance/Neuromuscular Re-Education Activity Performed:  (Pt tolerated static standing x 1-2 min x 2 trials with cues for improved upright posture/balance.  Pt having difficulty fully extending LE's, poor push through (B) arms)        Outcome Measures:[facility] Daily Activity  Putting on and taking off regular lower body clothing: A lot  Bathing (including washing, rinsing, drying): A lot  Putting on and taking off regular upper body clothing: A little  Toileting, which includes using toilet, bedpan or urinal: A lot  Taking care of personal grooming such as brushing teeth: A little  Eating Meals: A little  Daily Activity - Total Score: 15        Education Documentation  Body Mechanics, taught by Jennifer L Felty, OTA at 7/1/2025  9:21 AM.  Learner: Patient  Readiness: Acceptance  Method: Explanation  Response: Needs Reinforcement    EDUCATION:  Education  Individual(s) Educated: Patient  Education Provided:  (Pt educated on fall prevention techniques; safe transfer techniques including hand placement and positioning; techniques/strategies for balance improvement; compensatory adl techniques; safe body mechanics; energy conservation techniques.)  Patient Response to Education: Patient/Caregiver Verbalized Understanding of Information    Goals:  Encounter Problems       Encounter Problems (Active)       OT Goals       Good dyn sitting balance EOB for ADL participation (Progressing)       Start:  06/27/25    Expected End:  07/11/25            Min A for all functional transfers  (Progressing)       Start:  06/27/25    Expected End:  07/11/25            Min A for LB dressing  (Progressing)       Start:  06/27/25    Expected End:  07/11/25            Min A for toileting tasks and clothing mgmt  (Progressing)       Start:  06/27/25    Expected  End:  07/11/25            Pt. will tolerate static/dynamic standing x2 minutes to progress strength and endurance for ADLs and transfers (Progressing)       Start:  06/27/25    Expected End:  07/11/25               OT Problem

## 2025-07-02 LAB
HOLD SPECIMEN: NORMAL

## 2025-07-02 PROCEDURE — 97530 THERAPEUTIC ACTIVITIES: CPT | Mod: GP,CQ

## 2025-07-02 PROCEDURE — 97535 SELF CARE MNGMENT TRAINING: CPT | Mod: GO,CO

## 2025-07-02 PROCEDURE — 2500000001 HC RX 250 WO HCPCS SELF ADMINISTERED DRUGS (ALT 637 FOR MEDICARE OP): Performed by: STUDENT IN AN ORGANIZED HEALTH CARE EDUCATION/TRAINING PROGRAM

## 2025-07-02 PROCEDURE — 2500000004 HC RX 250 GENERAL PHARMACY W/ HCPCS (ALT 636 FOR OP/ED): Performed by: INTERNAL MEDICINE

## 2025-07-02 PROCEDURE — 2500000004 HC RX 250 GENERAL PHARMACY W/ HCPCS (ALT 636 FOR OP/ED): Performed by: STUDENT IN AN ORGANIZED HEALTH CARE EDUCATION/TRAINING PROGRAM

## 2025-07-02 PROCEDURE — 1210000001 HC SEMI-PRIVATE ROOM DAILY

## 2025-07-02 PROCEDURE — 2500000001 HC RX 250 WO HCPCS SELF ADMINISTERED DRUGS (ALT 637 FOR MEDICARE OP): Performed by: INTERNAL MEDICINE

## 2025-07-02 PROCEDURE — 99232 SBSQ HOSP IP/OBS MODERATE 35: CPT | Performed by: STUDENT IN AN ORGANIZED HEALTH CARE EDUCATION/TRAINING PROGRAM

## 2025-07-02 RX ORDER — PREDNISONE 5 MG/1
5 TABLET ORAL DAILY
Status: DISCONTINUED | OUTPATIENT
Start: 2025-08-06 | End: 2025-07-05 | Stop reason: HOSPADM

## 2025-07-02 RX ORDER — PREDNISONE 20 MG/1
40 TABLET ORAL DAILY
Status: DISCONTINUED | OUTPATIENT
Start: 2025-07-02 | End: 2025-07-05 | Stop reason: HOSPADM

## 2025-07-02 RX ORDER — PANTOPRAZOLE SODIUM 40 MG/1
40 TABLET, DELAYED RELEASE ORAL
Status: DISCONTINUED | OUTPATIENT
Start: 2025-07-03 | End: 2025-07-05 | Stop reason: HOSPADM

## 2025-07-02 RX ORDER — PREDNISONE 10 MG/1
10 TABLET ORAL DAILY
Status: DISCONTINUED | OUTPATIENT
Start: 2025-07-30 | End: 2025-07-05 | Stop reason: HOSPADM

## 2025-07-02 RX ORDER — OXYCODONE HYDROCHLORIDE 5 MG/1
5 TABLET ORAL EVERY 4 HOURS PRN
Refills: 0 | Status: DISCONTINUED | OUTPATIENT
Start: 2025-07-02 | End: 2025-07-05 | Stop reason: HOSPADM

## 2025-07-02 RX ORDER — OXYCODONE HYDROCHLORIDE 5 MG/1
10 TABLET ORAL EVERY 6 HOURS PRN
Refills: 0 | Status: DISCONTINUED | OUTPATIENT
Start: 2025-07-02 | End: 2025-07-05 | Stop reason: HOSPADM

## 2025-07-02 RX ORDER — PREDNISONE 20 MG/1
20 TABLET ORAL DAILY
Status: DISCONTINUED | OUTPATIENT
Start: 2025-07-23 | End: 2025-07-05 | Stop reason: HOSPADM

## 2025-07-02 RX ADMIN — OXYCODONE HYDROCHLORIDE 10 MG: 5 TABLET ORAL at 08:27

## 2025-07-02 RX ADMIN — PREDNISONE 40 MG: 20 TABLET ORAL at 17:15

## 2025-07-02 RX ADMIN — OXYCODONE HYDROCHLORIDE 10 MG: 5 TABLET ORAL at 17:56

## 2025-07-02 RX ADMIN — METHYLPREDNISOLONE SODIUM SUCCINATE 40 MG: 40 INJECTION, POWDER, FOR SOLUTION INTRAMUSCULAR; INTRAVENOUS at 08:27

## 2025-07-02 RX ADMIN — HYDROMORPHONE HYDROCHLORIDE 0.4 MG: 1 INJECTION, SOLUTION INTRAMUSCULAR; INTRAVENOUS; SUBCUTANEOUS at 13:54

## 2025-07-02 RX ADMIN — ONDANSETRON 4 MG: 4 TABLET, ORALLY DISINTEGRATING ORAL at 17:56

## 2025-07-02 RX ADMIN — PANTOPRAZOLE SODIUM 40 MG: 40 INJECTION, POWDER, LYOPHILIZED, FOR SOLUTION INTRAVENOUS at 08:27

## 2025-07-02 RX ADMIN — HYDROMORPHONE HYDROCHLORIDE 0.4 MG: 1 INJECTION, SOLUTION INTRAMUSCULAR; INTRAVENOUS; SUBCUTANEOUS at 22:50

## 2025-07-02 RX ADMIN — ONDANSETRON 4 MG: 4 TABLET, ORALLY DISINTEGRATING ORAL at 10:11

## 2025-07-02 RX ADMIN — HYDROMORPHONE HYDROCHLORIDE 0.4 MG: 1 INJECTION, SOLUTION INTRAMUSCULAR; INTRAVENOUS; SUBCUTANEOUS at 04:00

## 2025-07-02 ASSESSMENT — COGNITIVE AND FUNCTIONAL STATUS - GENERAL
WALKING IN HOSPITAL ROOM: A LITTLE
STANDING UP FROM CHAIR USING ARMS: A LITTLE
STANDING UP FROM CHAIR USING ARMS: A LITTLE
DAILY ACTIVITIY SCORE: 19
TURNING FROM BACK TO SIDE WHILE IN FLAT BAD: A LITTLE
TOILETING: A LITTLE
CLIMB 3 TO 5 STEPS WITH RAILING: A LOT
TURNING FROM BACK TO SIDE WHILE IN FLAT BAD: A LITTLE
HELP NEEDED FOR BATHING: A LITTLE
PERSONAL GROOMING: A LITTLE
DRESSING REGULAR LOWER BODY CLOTHING: A LOT
MOVING TO AND FROM BED TO CHAIR: A LITTLE
HELP NEEDED FOR BATHING: A LITTLE
TOILETING: A LOT
PERSONAL GROOMING: A LITTLE
DAILY ACTIVITIY SCORE: 19
MOVING FROM LYING ON BACK TO SITTING ON SIDE OF FLAT BED WITH BEDRAILS: A LITTLE
MOBILITY SCORE: 18
MOVING TO AND FROM BED TO CHAIR: A LITTLE
TURNING FROM BACK TO SIDE WHILE IN FLAT BAD: A LITTLE
MOBILITY SCORE: 16
STANDING UP FROM CHAIR USING ARMS: A LITTLE
PERSONAL GROOMING: A LITTLE
CLIMB 3 TO 5 STEPS WITH RAILING: A LOT
MOBILITY SCORE: 18
HELP NEEDED FOR BATHING: A LITTLE
DAILY ACTIVITIY SCORE: 17
DRESSING REGULAR UPPER BODY CLOTHING: A LITTLE
CLIMB 3 TO 5 STEPS WITH RAILING: TOTAL
DRESSING REGULAR LOWER BODY CLOTHING: A LITTLE
TOILETING: A LITTLE
DRESSING REGULAR LOWER BODY CLOTHING: A LITTLE
DRESSING REGULAR UPPER BODY CLOTHING: A LITTLE
WALKING IN HOSPITAL ROOM: A LITTLE
DRESSING REGULAR UPPER BODY CLOTHING: A LITTLE
WALKING IN HOSPITAL ROOM: A LITTLE
MOVING TO AND FROM BED TO CHAIR: A LITTLE

## 2025-07-02 ASSESSMENT — PAIN SCALES - GENERAL
PAINLEVEL_OUTOF10: 8
PAINLEVEL_OUTOF10: 8
PAINLEVEL_OUTOF10: 1
PAINLEVEL_OUTOF10: 7
PAINLEVEL_OUTOF10: 3
PAINLEVEL_OUTOF10: 8

## 2025-07-02 ASSESSMENT — PAIN - FUNCTIONAL ASSESSMENT
PAIN_FUNCTIONAL_ASSESSMENT: 0-10

## 2025-07-02 ASSESSMENT — PAIN DESCRIPTION - LOCATION
LOCATION: ABDOMEN

## 2025-07-02 ASSESSMENT — PAIN DESCRIPTION - ORIENTATION: ORIENTATION: LOWER;MID

## 2025-07-02 ASSESSMENT — PAIN DESCRIPTION - DESCRIPTORS: DESCRIPTORS: CRAMPING;SQUEEZING

## 2025-07-02 NOTE — PROGRESS NOTES
Medical Group Progress Note  ASSESSMENT & PLAN:     Acute blood loss anemia, symptomatic  GI bleed  Ulcerative colitis  -having increased frequency of loose, bloody stool and associated abdominal pain  -hgb down to 6.5; baseline ~9-10  -transfusing 2uPRBC in ED  -CT abd/pel showing nonspecific mild colitis from the hepatic flexure down through the rectum probably consistent with her known UC  -has mostly been on steroids previously, has refused biologics  -looks like most recently she was on mesalamine with GI at [facility]    Acute non-occlusive DVT  -involving the right extrarenal iliac vein, right common iliac vein, infrarenal IVC  -likely related to her IBD  -don't think we can fully anticoagulate at this point given her significant symptomatic anemia and GI bleeding  -will get formal bilateral duplex  -IVC filter in place, off anticoagulation  -once UC is under better control plan filter removal and initiation of DOAC     Hypokalemia  -replete     HTN  HLD  -hold antihypertensives for now              VTE Prophylaxis: held for now    7/2/25:  H/H has improved, Hgb 10.8 today  Abd pain stable  Diet advanced to Regular soft & bite sized  Repeat inflammatory markers improving, ESR WNL  -Transition to prednisone taper, reduce by 10 mg per week as ordered  Plan to initiate biologic as outpt  Flex sig showing severe erythematous ulcerated mucosa in the descending colon, sigmoid colon, rectum; biopsies taken  IVC filter in place  Plan to remove IVC filter and start on anticoagulation when GI issues are under better control and hgb stable  Try to wean to oral pain meds    Medically ready for discharge to SNF with close outpt GI follow up      Joaquín Erickson, DO    SUBJECTIVE     No overnight events.    OBJECTIVE:     Last Recorded Vitals:  Vitals:    07/01/25 1930 07/02/25 0500 07/02/25 0822 07/02/25 1601   BP: 153/71 159/78 132/62 (!) 148/91   BP Location:   Left arm    Patient Position: Sitting  Lying    Pulse: 86 90  93 82   Resp: 16 16 16 20   Temp: 36.8 °C (98.2 °F) 36.7 °C (98.1 °F) 37.3 °C (99.1 °F) 36.5 °C (97.7 °F)   TempSrc: Temporal  Temporal Temporal   SpO2: 96% 99% 96% 96%   Weight:       Height:         Last I/O:  No intake/output data recorded.    Physical Exam    GEN: ill appearing; pale  HEENT: NCAT, PERRLA, EOMI, moist mucous membranes  NECK: supple, no masses  CV: RRR, no m/r/g, no LE edema  LUNGS: CTAB, no w/r/c  ABD: soft, mild diffuse TTP without rebound or guarding  SKIN: no rashes  MSK; no gross deformities, normal joints; R groin site c/d/I no swelling  NEURO: A+Ox3, no FND  PSYCH: appropriate mood, affect    Inpatient Medications:  Scheduled Medications[1]    PRN Medications  PRN Medications[2]    Continuous Medications:  Continuous Medications[3]  LABS AND IMAGING:     Labs:  Results from last 7 days   Lab Units 07/01/25  0906 06/30/25  0545 06/29/25  0538   WBC AUTO x10*3/uL 15.6* 10.3 9.1   RBC AUTO x10*6/uL 4.40 3.51* 3.73*   HEMOGLOBIN g/dL 10.8* 8.6* 9.2*   HEMATOCRIT % 35.3* 28.1* 31.3*   MCV fL 80 80 84   MCH pg 24.5* 24.5* 24.7*   MCHC g/dL 30.6* 30.6* 29.4*   RDW % 19.9* 19.6* 20.0*   PLATELETS AUTO x10*3/uL 341 254 228     Results from last 7 days   Lab Units 06/30/25  0545 06/29/25  0539 06/27/25  0646 06/26/25  0545   SODIUM mmol/L 137 137 140 140   POTASSIUM mmol/L 3.9 3.8 3.3* 4.1   CHLORIDE mmol/L 106 107 108* 112*   CO2 mmol/L 26 22 21 22   BUN mg/dL 7 6 12 9   CREATININE mg/dL 0.57 0.61 0.65 0.56   GLUCOSE mg/dL 79 87 87 123*   PROTEIN TOTAL g/dL  --   --  6.3* 6.0*   CALCIUM mg/dL 7.8* 7.7* 7.9* 8.0*   BILIRUBIN TOTAL mg/dL  --   --  0.5 0.4   ALK PHOS U/L  --   --  76 72   AST U/L  --   --  12 11   ALT U/L  --   --  10 8                 Imaging:  Flexible Sigmoidoscopy  Table formatting from the original result was not included.  Impression  Severe erythematous, ulcerated mucosa in the descending colon, sigmoid   colon and rectum in continuous fashion consistent with Smith Grade 3    colitis. Performed cold forceps biopsy to rule out superimposed CMV.     Findings  Severe, continuous erythematous and ulcerated mucosa in the descending   colon, sigmoid colon and rectum; performed cold forceps biopsy       Recommendation  Await pathology results   Follow up with primary gastroenterologist   Continue IV solumedrol, trend daily CRP  Complete biologic workup if not recently updated         Indication  Abnormal findings on diagnostic imaging of other parts of digestive tract,   Ulcerative colitis with complication, unspecified location (Multi)    Post-Op Diagnosis  None    Staff  Staff Role   Viri Elizalde MD Proceduralist     Medications  See Anesthesia Record.     Preprocedure  A history and physical has been performed, and patient medication   allergies have been reviewed. The patient's tolerance of previous   anesthesia has been reviewed. The risks and benefits of the procedure and   the sedation options and risks were discussed with the patient. All   questions were answered and informed consent obtained.    Details of the Procedure  The patient underwent monitored anesthesia care, which was administered by   an anesthesia professional. The patient's blood pressure, heart rate,   level of consciousness, oxygen saturation, respirations, ECG and ETCO2   were monitored throughout the procedure. A digital rectal exam was   performed. The scope was introduced through the anus and advanced to the   descending colon. Retroflexion was not performed due to inflammation. The   patient's estimated blood loss was minimal. The procedure was not   difficult. The patient tolerated the procedure well. There were no   apparent adverse events.     Events  Procedure Events   Event Event Time   ENDO SCOPE IN TIME 6/26/2025  5:36 PM   ENDO SCOPE OUT TIME 6/26/2025  5:43 PM     Specimens  ID Type Source Tests Collected by Time   1 : R/O CMV, Colitis, HX of UC Tissue COLON - DESCENDING BIOPSY SURGICAL   PATHOLOGY  EXAM Viri Elizalde MD 6/26/2025 1739     Procedure Location  [address] [address] [phone]    Referring Provider  Viky May, APRN-CNP    Procedure Provider  MD Viky Garrett  Invasive vascular procedure     [facility], Cath Lab         [address] [place] [id number]    Cardiovascular Catheterization Report    Patient Name:      MERVIN DENNEY   Performing           [id number] Valles                                         Physician:           MD  Study Date:        6/25/2025           Verifying Physician: Adolfo Valles MD  MRN/PID:           [id number]            Cardiologist:  Accession#:        [id number]        Ordering Provider:   [id number] ASHLEY COLBERT  Date of Birth/Age: 1955 / 69 years Fellow:  Gender:            F                   Fellow:  Encounter#:        [id number]       Study: IVC - Inferior Vena Cava Filter       Procedure Description:  After infiltration with 2% Lidocaine, the was cannulated with a modified Seldinger technique. After infiltration of local anesthetic, the right femoral vein was cannulated with a percutaneous technique. A 7 Vietnamese sheath was placed in the vein. Post-procedure, the venous sheath was pulled and pressure was applied to the site.     Peripheral Interventions:  IVC filter was placed under floroscopy guidance after vennogram ws done no complication.     Hemo Personnel:  +---------------+---------+  Name           Duty       +---------------+---------+  Sidra Valles MD 1  +---------------+---------+       Cardiac Cath Post Procedure Notes:  Post Procedure Diagnosis: IVC filter placement.  Blood Loss:               Estimated blood loss during the procedure was 2 mls.  Specimens Removed:        Number  of specimen(s) removed: none.    ____________________________________________________________________________________  CONCLUSIONS:   1. Successful placement of IVC filter.    ICD 10 Codes:  Personal history of thrombophlebitis-Z86.72     CPT Codes:  Venogram, IVC-75579; Insertion IVC filter-05546; Moderate Sedation Services 1st additional 15 minutes patient >5 years-49178; Moderate Sedation Services 2nd additional 15 minutes patient >5 years-06201     [id number] Sidra Valles MD  Performing Physician  Electronically signed by [id number] Valles MD on 6/25/2025 at 3:41:22 PM    ** Final **                          [1] [Held by provider] enoxaparin, 40 mg, subcutaneous, q24h  methylPREDNISolone sodium succinate (PF), 40 mg, intravenous, q24h  [START ON 7/3/2025] pantoprazole, 40 mg, oral, Daily before breakfast     [2] PRN medications: HYDROmorphone, lubricating eye drops, ondansetron ODT **OR** ondansetron, oxyCODONE  [3]

## 2025-07-02 NOTE — CARE PLAN
The patient's goals for the shift include      The clinical goals for the shift include Patient will remain safe throughout the shift      Problem: Pain - Adult  Goal: Verbalizes/displays adequate comfort level or baseline comfort level  Outcome: Progressing     Problem: Skin  Goal: Decreased wound size/increased tissue granulation at next dressing change  Outcome: Progressing     Problem: Fall/Injury  Goal: Verbalize understanding of risk factor reduction measures to prevent injury from fall in the home  Outcome: Progressing

## 2025-07-02 NOTE — PROGRESS NOTES
"Physical Therapy    Physical Therapy Treatment    Patient Name: Leola Jennings  MRN: [id number]  Today's Date: 7/2/2025  Time Calculation  Start Time: 0822  Stop Time: 0858  Time Calculation (min): 36 min     1103/1103-A    Assessment/Plan   Barriers to Discharge Home: Caregiver assistance, Physical needs    Caregiver Assistance: Patient lives alone and/or does not have reliable caregiver assistance    Physical Needs: 24hr mobility assistance needed, 24hr ADL assistance needed    End of Session Communication: Bedside nurse    Assessment Comment:  (Improving mobility this date. Patient able to increase amb distance with decreased assist level. Continues to require  cues for safety throughout treatment.)    End of Session Patient Position:  (Patient sitting up in chair with encouragement.  Call light/tray in reach. Chair alarm on.)    PT Plan  Inpatient/Swing Bed or Outpatient: Inpatient  Treatment/Interventions: Bed mobility, Transfer training, Gait training  PT Plan: Ongoing PT  PT Frequency: 4 times per week  PT Discharge Recommendations: Moderate intensity level of continued care, High intensity level of continued care    PT Recommended Transfer Status: Assist x1, Total assist    General Visit Information:   PT  Visit  PT Received On: 07/02/25    General  Co-Treatment: OT  Co-Treatment Reason:  (co-treatment performed with OT to maximize patient's safety and function. One unit billed for PT)  Prior to Session Communication:  (Cleared by RN for PT.)  Patient Position Received:  (Patient presents sitting on BSC, agreeable to PT.)    General Comment:  (Dx: anemia IVC thrombosis)    General Observations:   General Observation:  (alarm)    Subjective  \"I don't know if your the right person to ask this to but aren't they going to bring me some Velcro weights that I can put around by arms and legs so that I can get stronger?\"  \"  I was so worried that the IV's in my hand would just pop if I put to much jesica through my " "hands on the walker. That's why I wasn't pushing down very hard yesterday\"    Precautions:  Precautions  Medical Precautions: Fall precautions      Pain:  Pain Assessment  Pain Assessment: 0-10  0-10 (Numeric) Pain Score:  (7-8/10)  Pain Location:  (lower abdominal region)  Pain Descriptors:  (\"twisting\")  Pain Interventions: Ambulation/increased activity; nursing to give pain meds after PT  Response to Interventions: Resting quietly    Cognition:  Cognition  Overall Cognitive Status: Impaired (anxious; difficult to redirect at times; requires much reassurance throughout treatment.)      Balance:   Static Standing Balance  Static Standing-Level of Assistance:  (Fair static stand)  Static Standing-Comment/Number of Minutes:  (9 minutes total standing.  Patient reports a burning sensation in her quads and calfs with prolonged standing.  \"It doesn't feel like they are giving out on me. It's a good burn\"    Dynamic Standing Balance  Dynamic Standing-Balance:  (Poor+/Fair - dynamic stand.)      Activity Tolerance:  Activity Tolerance  Endurance: Decreased tolerance for upright activites      Therapeutic Exercise  Therapeutic Exercise Performed:  (seated LE ther-ex: AP, heel raises x10;  LAQ with 3 sec hold x5).  Patient reports increased fatigue in LE's with there-ex.  Encouraged her to perform seated ex's on her own as able       Ambulation/Gait Training  Ambulation/Gait Training Performed:  (Patient amb 10' + 20' with ww and min x1. Gait belt (placed high per patient request).  Patient amb with slow, guarded gait. Unsteady initially with knees buckling, but improved with time.)    Transfers  Transfer:  (sit->stand: min x2 Three sit/stands performed (BSC; EOB x2). Patient agreeable to using ww. Required instructions for hand placement and technique.  Patient unsteady with initial stand; knees buckling. Delay in transferring hands up to ww from EOB.  Tends to stick buttocks out, requires cues for weight shifting/posture. "        Outcome Measures:     [facility] Basic Mobility  Turning from your back to your side while in a flat bed without using bedrails: A little  Moving from lying on your back to sitting on the side of a flat bed without using bedrails: A little  Moving to and from bed to chair (including a wheelchair): A little  Standing up from a chair using your arms (e.g. wheelchair or bedside chair): A little  To walk in hospital room: A little  Climbing 3-5 steps with railing: Total  Basic Mobility - Total Score: 16         Education Documentation  Mobility Training, taught by Felicia Oh PTA at 7/2/2025 12:46 PM.  Learner: Patient  Readiness: Acceptance  Method: Explanation, Demonstration  Response: Needs Reinforcement    Education Comments  Individual(s) Educated: Patient  Education Provided:  (Patient educated in safe tranfers, gait, use of ww, balance and LE ther-ex)  Patient Response to Education:  (Receptive to education but requires cues for adherence.)    Encounter Problems       Encounter Problems (Active)       PT Problem       Pt will demonstrate min A with bed mobility to edge of bed.   (Progressing)       Start:  06/27/25    Expected End:  07/11/25            Pt will demonstrate min A with sit to stand/chair transfers with FWW.   (Progressing)       Start:  06/27/25    Expected End:  07/11/25            Pt will ambulate 30 feet with FWW min A .   (Progressing)       Start:  06/27/25    Expected End:  07/11/25            Pt to demo improved BLE strength by being able to complete supine/seated thera ex 2x20 BLEs with 4 or less rest breaks .   (Progressing)       Start:  06/27/25    Expected End:  07/11/25               Pain - Adult

## 2025-07-03 ENCOUNTER — APPOINTMENT (OUTPATIENT)
Dept: PRIMARY CARE | Facility: CLINIC | Age: 70
End: 2025-07-03
Payer: MEDICARE

## 2025-07-03 DIAGNOSIS — K51.311 ULCERATIVE RECTOSIGMOIDITIS WITH RECTAL BLEEDING (MULTI): Primary | ICD-10-CM

## 2025-07-03 LAB — SCAN RESULT: NORMAL

## 2025-07-03 PROCEDURE — 2500000004 HC RX 250 GENERAL PHARMACY W/ HCPCS (ALT 636 FOR OP/ED): Performed by: INTERNAL MEDICINE

## 2025-07-03 PROCEDURE — 1210000001 HC SEMI-PRIVATE ROOM DAILY

## 2025-07-03 PROCEDURE — 2500000001 HC RX 250 WO HCPCS SELF ADMINISTERED DRUGS (ALT 637 FOR MEDICARE OP): Performed by: STUDENT IN AN ORGANIZED HEALTH CARE EDUCATION/TRAINING PROGRAM

## 2025-07-03 PROCEDURE — 2500000004 HC RX 250 GENERAL PHARMACY W/ HCPCS (ALT 636 FOR OP/ED): Performed by: STUDENT IN AN ORGANIZED HEALTH CARE EDUCATION/TRAINING PROGRAM

## 2025-07-03 PROCEDURE — 99232 SBSQ HOSP IP/OBS MODERATE 35: CPT | Performed by: STUDENT IN AN ORGANIZED HEALTH CARE EDUCATION/TRAINING PROGRAM

## 2025-07-03 PROCEDURE — 2500000001 HC RX 250 WO HCPCS SELF ADMINISTERED DRUGS (ALT 637 FOR MEDICARE OP): Performed by: INTERNAL MEDICINE

## 2025-07-03 RX ORDER — EPINEPHRINE 0.3 MG/.3ML
0.3 INJECTION SUBCUTANEOUS EVERY 5 MIN PRN
OUTPATIENT
Start: 2025-07-03

## 2025-07-03 RX ORDER — DIPHENHYDRAMINE HYDROCHLORIDE 50 MG/ML
50 INJECTION, SOLUTION INTRAMUSCULAR; INTRAVENOUS AS NEEDED
OUTPATIENT
Start: 2025-10-09

## 2025-07-03 RX ORDER — EPINEPHRINE 0.3 MG/.3ML
0.3 INJECTION SUBCUTANEOUS EVERY 5 MIN PRN
OUTPATIENT
Start: 2025-10-09

## 2025-07-03 RX ORDER — ALBUTEROL SULFATE 0.83 MG/ML
3 SOLUTION RESPIRATORY (INHALATION) AS NEEDED
OUTPATIENT
Start: 2025-10-09

## 2025-07-03 RX ORDER — FAMOTIDINE 10 MG/ML
20 INJECTION, SOLUTION INTRAVENOUS ONCE AS NEEDED
OUTPATIENT
Start: 2025-10-09

## 2025-07-03 RX ORDER — DIPHENHYDRAMINE HYDROCHLORIDE 50 MG/ML
50 INJECTION, SOLUTION INTRAMUSCULAR; INTRAVENOUS AS NEEDED
OUTPATIENT
Start: 2025-07-03

## 2025-07-03 RX ORDER — FAMOTIDINE 10 MG/ML
20 INJECTION, SOLUTION INTRAVENOUS ONCE AS NEEDED
OUTPATIENT
Start: 2025-07-03

## 2025-07-03 RX ORDER — ALBUTEROL SULFATE 0.83 MG/ML
3 SOLUTION RESPIRATORY (INHALATION) AS NEEDED
OUTPATIENT
Start: 2025-07-03

## 2025-07-03 RX ADMIN — OXYCODONE HYDROCHLORIDE 10 MG: 5 TABLET ORAL at 17:41

## 2025-07-03 RX ADMIN — OXYCODONE HYDROCHLORIDE 10 MG: 5 TABLET ORAL at 11:33

## 2025-07-03 RX ADMIN — ONDANSETRON 4 MG: 4 TABLET, ORALLY DISINTEGRATING ORAL at 15:53

## 2025-07-03 RX ADMIN — ONDANSETRON 4 MG: 4 TABLET, ORALLY DISINTEGRATING ORAL at 08:19

## 2025-07-03 RX ADMIN — OXYCODONE HYDROCHLORIDE 10 MG: 5 TABLET ORAL at 04:19

## 2025-07-03 RX ADMIN — OXYCODONE HYDROCHLORIDE 10 MG: 5 TABLET ORAL at 23:48

## 2025-07-03 RX ADMIN — PANTOPRAZOLE SODIUM 40 MG: 40 TABLET, DELAYED RELEASE ORAL at 06:31

## 2025-07-03 RX ADMIN — PREDNISONE 40 MG: 20 TABLET ORAL at 08:19

## 2025-07-03 ASSESSMENT — PAIN DESCRIPTION - ORIENTATION
ORIENTATION: MID;LOWER
ORIENTATION: LOWER
ORIENTATION: LOWER
ORIENTATION: LOWER;MID

## 2025-07-03 ASSESSMENT — PAIN - FUNCTIONAL ASSESSMENT
PAIN_FUNCTIONAL_ASSESSMENT: 0-10

## 2025-07-03 ASSESSMENT — COGNITIVE AND FUNCTIONAL STATUS - GENERAL
DRESSING REGULAR UPPER BODY CLOTHING: A LITTLE
CLIMB 3 TO 5 STEPS WITH RAILING: A LITTLE
DRESSING REGULAR LOWER BODY CLOTHING: A LITTLE
TOILETING: A LITTLE
HELP NEEDED FOR BATHING: A LITTLE
WALKING IN HOSPITAL ROOM: A LITTLE
TURNING FROM BACK TO SIDE WHILE IN FLAT BAD: A LITTLE
MOVING TO AND FROM BED TO CHAIR: A LITTLE
MOBILITY SCORE: 19
DAILY ACTIVITIY SCORE: 20
STANDING UP FROM CHAIR USING ARMS: A LITTLE

## 2025-07-03 ASSESSMENT — PAIN SCALES - GENERAL
PAINLEVEL_OUTOF10: 8
PAINLEVEL_OUTOF10: 7
PAINLEVEL_OUTOF10: 9
PAINLEVEL_OUTOF10: 7
PAINLEVEL_OUTOF10: 8
PAINLEVEL_OUTOF10: 5 - MODERATE PAIN
PAINLEVEL_OUTOF10: 6

## 2025-07-03 ASSESSMENT — PAIN DESCRIPTION - DESCRIPTORS
DESCRIPTORS: CRAMPING;SQUEEZING
DESCRIPTORS: CRAMPING

## 2025-07-03 ASSESSMENT — PAIN DESCRIPTION - LOCATION
LOCATION: ABDOMEN

## 2025-07-03 ASSESSMENT — ACTIVITIES OF DAILY LIVING (ADL): HOME_MANAGEMENT_TIME_ENTRY: 24

## 2025-07-03 NOTE — PROGRESS NOTES
Medical Group Progress Note  ASSESSMENT & PLAN:     Acute blood loss anemia, symptomatic  GI bleed  Ulcerative colitis  -having increased frequency of loose, bloody stool and associated abdominal pain  -Hgb has remained within her baseline and is stable; baseline ~9-10; transfused 2uPRBC in ED  -CT abd/pel showing nonspecific mild colitis from the hepatic flexure down through the rectum probably consistent with her known UC  -has mostly been on steroids previously, has refused biologics  -looks like most recently she was on mesalamine with GI at [facility]    Acute non-occlusive DVT  -involving the right extrarenal iliac vein, right common iliac vein, infrarenal IVC  -likely related to her IBD  -don't think we can fully anticoagulate at this point given her significant symptomatic anemia and GI bleeding  -will get formal bilateral duplex  -IVC filter in place, off anticoagulation  -once UC is under better control plan filter removal and initiation of DOAC     Hypokalemia  -replete     HTN  HLD  -hold antihypertensives for now     VTE Prophylaxis: held for now    7/3/25:  H/H has improved, Hgb 10.8 today  Abd pain stable  Diet advanced to Regular soft & bite sized  Repeat inflammatory markers improving, ESR WNL  -Transition to prednisone taper, reduce by 10 mg per week as ordered  Plan to initiate biologic as outpt  Flex sig showing severe erythematous ulcerated mucosa in the descending colon, sigmoid colon, rectum; biopsies taken  IVC filter in place  Plan to remove IVC filter and start on anticoagulation when GI issues are under better control and hgb stable  Try to wean to oral pain meds    Medically ready for discharge to SNF with close outpt GI follow up      Joaquín Erickson, DO    SUBJECTIVE     No overnight events. She is reporting abdominal discomfort but this has improved compared to the previous days.    OBJECTIVE:     Last Recorded Vitals:  Vitals:    07/02/25 1601 07/02/25 1900 07/03/25 0419 07/03/25 0729    BP: (!) 148/91 157/70 162/72 153/67   BP Location:  Left arm Left arm    Patient Position:  Lying Lying    Pulse: 82 85 71 78   Resp: 20 22 18    Temp: 36.5 °C (97.7 °F) 36.2 °C (97.2 °F) 35.8 °C (96.4 °F) 36 °C (96.8 °F)   TempSrc: Temporal Temporal Temporal    SpO2: 96% 97% 97% 96%   Weight:       Height:         Last I/O:  No intake/output data recorded.    Physical Exam    GEN: In NAD; pale  HEENT: NCAT, PERRLA, EOMI, moist mucous membranes  NECK: supple, no masses  CV: RRR, no m/r/g, no LE edema  LUNGS: CTAB, no w/r/c  ABD: soft, mild diffuse TTP without rebound or guarding  SKIN: no rashes  MSK; no gross deformities, normal joints; R groin site c/d/I no swelling  NEURO: A+Ox3, no FND  PSYCH: appropriate mood, affect    Inpatient Medications:  Scheduled Medications[1]    PRN Medications  PRN Medications[2]    Continuous Medications:  Continuous Medications[3]  LABS AND IMAGING:     Labs:  Results from last 7 days   Lab Units 07/01/25  0906 06/30/25  0545 06/29/25  0538   WBC AUTO x10*3/uL 15.6* 10.3 9.1   RBC AUTO x10*6/uL 4.40 3.51* 3.73*   HEMOGLOBIN g/dL 10.8* 8.6* 9.2*   HEMATOCRIT % 35.3* 28.1* 31.3*   MCV fL 80 80 84   MCH pg 24.5* 24.5* 24.7*   MCHC g/dL 30.6* 30.6* 29.4*   RDW % 19.9* 19.6* 20.0*   PLATELETS AUTO x10*3/uL 341 254 228     Results from last 7 days   Lab Units 06/30/25  0545 06/29/25  0539 06/27/25  0646   SODIUM mmol/L 137 137 140   POTASSIUM mmol/L 3.9 3.8 3.3*   CHLORIDE mmol/L 106 107 108*   CO2 mmol/L 26 22 21   BUN mg/dL 7 6 12   CREATININE mg/dL 0.57 0.61 0.65   GLUCOSE mg/dL 79 87 87   PROTEIN TOTAL g/dL  --   --  6.3*   CALCIUM mg/dL 7.8* 7.7* 7.9*   BILIRUBIN TOTAL mg/dL  --   --  0.5   ALK PHOS U/L  --   --  76   AST U/L  --   --  12   ALT U/L  --   --  10                 Imaging:  Flexible Sigmoidoscopy  Table formatting from the original result was not included.  Impression  Severe erythematous, ulcerated mucosa in the descending colon, sigmoid   colon and rectum in  continuous fashion consistent with Smith Grade 3   colitis. Performed cold forceps biopsy to rule out superimposed CMV.     Findings  Severe, continuous erythematous and ulcerated mucosa in the descending   colon, sigmoid colon and rectum; performed cold forceps biopsy       Recommendation  Await pathology results   Follow up with primary gastroenterologist   Continue IV solumedrol, trend daily CRP  Complete biologic workup if not recently updated         Indication  Abnormal findings on diagnostic imaging of other parts of digestive tract,   Ulcerative colitis with complication, unspecified location (Multi)    Post-Op Diagnosis  None    Staff  Staff Role   Viri Elizalde MD Proceduralist     Medications  See Anesthesia Record.     Preprocedure  A history and physical has been performed, and patient medication   allergies have been reviewed. The patient's tolerance of previous   anesthesia has been reviewed. The risks and benefits of the procedure and   the sedation options and risks were discussed with the patient. All   questions were answered and informed consent obtained.    Details of the Procedure  The patient underwent monitored anesthesia care, which was administered by   an anesthesia professional. The patient's blood pressure, heart rate,   level of consciousness, oxygen saturation, respirations, ECG and ETCO2   were monitored throughout the procedure. A digital rectal exam was   performed. The scope was introduced through the anus and advanced to the   descending colon. Retroflexion was not performed due to inflammation. The   patient's estimated blood loss was minimal. The procedure was not   difficult. The patient tolerated the procedure well. There were no   apparent adverse events.     Events  Procedure Events   Event Event Time   ENDO SCOPE IN TIME 6/26/2025  5:36 PM   ENDO SCOPE OUT TIME 6/26/2025  5:43 PM     Specimens  ID Type Source Tests Collected by Time   1 : R/O CMV, Colitis, HX of UC Tissue  COLON - DESCENDING BIOPSY SURGICAL   PATHOLOGY EXAM Viri Elizalde MD 6/26/2025 1739     Procedure Location  [address] [address] [phone]    Referring Provider  Viky May, APRN-CNP    Procedure Provider  MD Viky Garrett  Invasive vascular procedure     [facility], Cath Lab         [address] [place] [id number]    Cardiovascular Catheterization Report    Patient Name:      MERVIN DENNEY   Performing           [id number] Valles                                         Physician:           MD  Study Date:        6/25/2025           Verifying Physician: Adolfo Valles MD  MRN/PID:           [id number]            Cardiologist:  Accession#:        [id number]        Ordering Provider:   [id number] ASHLEY COLBERT  Date of Birth/Age: 1955 / 69 years Fellow:  Gender:            F                   Fellow:  Encounter#:        [id number]       Study: IVC - Inferior Vena Cava Filter       Procedure Description:  After infiltration with 2% Lidocaine, the was cannulated with a modified Seldinger technique. After infiltration of local anesthetic, the right femoral vein was cannulated with a percutaneous technique. A 7 Greek sheath was placed in the vein. Post-procedure, the venous sheath was pulled and pressure was applied to the site.     Peripheral Interventions:  IVC filter was placed under floroscopy guidance after vennogram ws done no complication.     Hemo Personnel:  +---------------+---------+  Name           Duty       +---------------+---------+  Sidra Valles MD 1  +---------------+---------+       Cardiac Cath Post Procedure Notes:  Post Procedure Diagnosis: IVC filter placement.  Blood Loss:               Estimated blood loss during the  procedure was 2 mls.  Specimens Removed:        Number of specimen(s) removed: none.    ____________________________________________________________________________________  CONCLUSIONS:   1. Successful placement of IVC filter.    ICD 10 Codes:  Personal history of thrombophlebitis-Z86.72     CPT Codes:  Venogram, IVC-26345; Insertion IVC filter-46754; Moderate Sedation Services 1st additional 15 minutes patient >5 years-26279; Moderate Sedation Services 2nd additional 15 minutes patient >5 years-31942     [id number] Sidra Valles MD  Performing Physician  Electronically signed by [id number] Valles MD on 6/25/2025 at 3:41:22 PM    ** Final **                            [1] pantoprazole, 40 mg, oral, Daily before breakfast  predniSONE, 40 mg, oral, Daily   Followed by  [START ON 7/9/2025] predniSONE, 35 mg, oral, Daily   Followed by  [START ON 7/16/2025] predniSONE, 30 mg, oral, Daily   Followed by  [START ON 7/23/2025] predniSONE, 20 mg, oral, Daily   Followed by  [START ON 7/30/2025] predniSONE, 10 mg, oral, Daily   Followed by  [START ON 8/6/2025] predniSONE, 5 mg, oral, Daily     [2] PRN medications: lubricating eye drops, ondansetron ODT **OR** ondansetron, oxyCODONE, oxyCODONE  [3]

## 2025-07-03 NOTE — PROGRESS NOTES
"Occupational Therapy    OT Treatment    Patient Name: Leola Jennings  MRN: [id number]  Department: [facility] [place]  Room: [address] [place] [address]  Today's Date: 7/2/2025  Time Calculation  Start Time: 0821  Stop Time: 0859  Time Calculation (min): 38 min        Assessment:  OT Assessment: pt continues to progress towards goals. Continue skilled OT to promote independence with ADLs and functional mobility  End of Session Communication: Bedside nurse  End of Session Patient Position: Up in chair, Alarm on (Patient sitting up in chair with encouragment. Call light/tray in reach. Chair alarm on.)     Plan:  Treatment Interventions: ADL retraining, Functional transfer training, Endurance training, Compensatory technique education  OT Frequency: 3 times per week  OT Discharge Recommendations: High intensity level of continued care  OT Recommended Transfer Status: Maximum assist, Assist of 2  OT - OK to Discharge: Yes (OT evaluation complete. Refer to MD for discharge.)  Treatment Interventions: ADL retraining, Functional transfer training, Endurance training, Compensatory technique education    Subjective   OT Visit Info:     General Visit Info:  General  Reason for Referral: Dx: anemia IVC thrombosis  Past Medical History Relevant to Rehab: celiacs, ulcerative colitis, RA, HLD, HTN  Co-Treatment: PT  Co-Treatment Reason: Maximize pt safety and functional outcomes while completing discipline specific treatments  Prior to Session Communication: Bedside nurse (cleared for participation)  Patient Position Received: Up in room (seated on BSC)  General Comment: Pt agreeable to OT, pleasant and cooperative.  Precautions:  Medical Precautions: Fall precautions        Pain:  Pain Assessment  Pain Assessment: 0-10  0-10 (Numeric) Pain Score:  (7-8/10)  Pain Location:  (lower abd)  Pain Descriptors:  (\"twisting\")  Pain Interventions: Ambulation/increased activity, Medication (See MAR), Emotional support  Response to Interventions: " "Content/relaxed    Objective    Cognition:  Cognition  Overall Cognitive Status:  (anxious; difficult to redirect at times; requires much reassurance throughout treatment.)  Safety/Judgement: Exceptions to WFL  Insight: Mild  Impulsive: Mildly    Activities of Daily Living: Grooming  Grooming Comments: Pt. stood at sink for hand hygiene. CGA for dynamic standing balance.  Cues for positioning walker and body safely at sink. Pt tolerated ~7 min of standing badl tasks    LE Dressing  LE Dressing:  (Min assist for LB dressing tasks.  Pt able to thread LE's seated at EOB with SBA. Min assist for clothing management and balance for standing portions)  Functional Standing Tolerance:  Functional Standing Tolerance Comments: Sustained stand tolerance x9 min with functional mobility and standing BADL tasks.  Pt tolerated well, reports upper legs \"feel tired\".  Bed Mobility/Transfers:      Transfer 1  Trials/Comments 1: sit->stand: min x2 Three sit/stands performed (BSC; EOB x2). Patient agreeable to using ww. Required instructions for hand placement and technique. Patient unsteady with initial stand; knees buckling. Delay in transferring hands up to ww from EOB.      Outcome Measures:[facility] Daily Activity  Putting on and taking off regular lower body clothing: A lot  Bathing (including washing, rinsing, drying): A little  Putting on and taking off regular upper body clothing: A little  Toileting, which includes using toilet, bedpan or urinal: A lot  Taking care of personal grooming such as brushing teeth: A little  Eating Meals: None  Daily Activity - Total Score: 17        Education Documentation  ADL Training, taught by Jennifer L Felty, OTA at 7/3/2025  7:27 AM.  Learner: Patient  Readiness: Acceptance  Method: Explanation  Response: Verbalizes Understanding     EDUCATION: Pt educated on fall prevention techniques; safe transfer techniques including hand placement and positioning; techniques/strategies for balance " improvement; compensatory adl techniques; safe body mechanics; energy conservation techniques.        Goals:  Encounter Problems       Encounter Problems (Active)       OT Goals       Good dyn sitting balance EOB for ADL participation (Progressing)       Start:  06/27/25    Expected End:  07/11/25            Min A for all functional transfers  (Progressing)       Start:  06/27/25    Expected End:  07/11/25            Min A for LB dressing  (Progressing)       Start:  06/27/25    Expected End:  07/11/25            Min A for toileting tasks and clothing mgmt  (Progressing)       Start:  06/27/25    Expected End:  07/11/25            Pt. will tolerate static/dynamic standing x2 minutes to progress strength and endurance for ADLs and transfers (Progressing)       Start:  06/27/25    Expected End:  07/11/25               OT Problem

## 2025-07-03 NOTE — PROGRESS NOTES
07/03/25 1643   Discharge Planning   Home or Post Acute Services Post acute facilities (Rehab/SNF/etc)   Type of Post Acute Facility Services Skilled nursing   Expected Discharge Disposition SNF  ([facility])   Does the patient need discharge transport arranged? Yes   [facility] coordination needed? Yes   Has discharge transport been arranged? No     Clinical updates sent to the facility. Facility submitted to insurance for precert, precert remains pending at this time.

## 2025-07-03 NOTE — CARE PLAN
The patient's goals for the shift include      The clinical goals for the shift include safety and comfort    Problem: Pain - Adult  Goal: Verbalizes/displays adequate comfort level or baseline comfort level  Outcome: Progressing     Problem: Nutrition  Goal: Nutrient intake appropriate for maintaining nutritional needs  Outcome: Progressing

## 2025-07-03 NOTE — CARE PLAN
Problem: Pain - Adult  Goal: Verbalizes/displays adequate comfort level or baseline comfort level  7/3/2025 1707 by Darcie Carr RN  Outcome: Progressing  7/3/2025 1641 by Darcie Carr RN  Flowsheets (Taken 7/3/2025 1641)  Verbalizes/displays adequate comfort level or baseline comfort level:   Encourage patient to monitor pain and request assistance   Administer analgesics based on type and severity of pain and evaluate response   Consider cultural and social influences on pain and pain management   Assess pain using appropriate pain scale   Implement non-pharmacological measures as appropriate and evaluate response   Notify Licensed Independent Practitioner if interventions unsuccessful or patient reports new pain     Problem: Discharge Planning  Goal: Discharge to home or other facility with appropriate resources  7/3/2025 1707 by Darcie Carr RN  Outcome: Progressing  7/3/2025 1641 by Darcie Carr RN  Flowsheets (Taken 7/3/2025 1641)  Discharge to home or other facility with appropriate resources:   Identify barriers to discharge with patient and caregiver   Identify discharge learning needs (meds, wound care, etc)   Refer to discharge planning if patient needs post-hospital services based on physician order or complex needs related to functional status, cognitive ability or social support system   Arrange for needed discharge resources and transportation as appropriate     Problem: Chronic Conditions and Co-morbidities  Goal: Patient's chronic conditions and co-morbidity symptoms are monitored and maintained or improved  7/3/2025 1707 by Darcie Carr RN  Outcome: Progressing  7/3/2025 1641 by Darcie Carr RN  Flowsheets (Taken 7/3/2025 1641)  Care Plan - Patient's Chronic Conditions and Co-Morbidity Symptoms are Monitored and Maintained or Improved:   Monitor and assess patient's chronic conditions and comorbid symptoms for stability, deterioration, or improvement   Collaborate with  multidisciplinary team to address chronic and comorbid conditions and prevent exacerbation or deterioration   Update acute care plan with appropriate goals if chronic or comorbid symptoms are exacerbated and prevent overall improvement and discharge     Problem: Nutrition  Goal: Nutrient intake appropriate for maintaining nutritional needs  Outcome: Progressing     Problem: Skin  Goal: Decreased wound size/increased tissue granulation at next dressing change  7/3/2025 1707 by Darcie Carr RN  Outcome: Progressing  7/3/2025 1641 by Darcie Carr RN  Flowsheets (Taken 7/3/2025 1641)  Decreased wound size/increased tissue granulation at next dressing change: Promote sleep for wound healing  Goal: Prevent/minimize sheer/friction injuries  7/3/2025 1707 by Darcie Carr RN  Outcome: Progressing  7/3/2025 1641 by Darcie Carr RN  Flowsheets (Taken 7/3/2025 1641)  Prevent/minimize sheer/friction injuries: Increase activity/out of bed for meals  Goal: Promote/optimize nutrition  7/3/2025 1707 by Darcie Carr RN  Outcome: Progressing  7/3/2025 1641 by Darcie Carr RN  Flowsheets (Taken 7/3/2025 1641)  Promote/optimize nutrition:   Monitor/record intake including meals   Offer water/supplements/favorite foods  Goal: Promote skin healing  7/3/2025 1707 by Darcie Carr RN  Outcome: Progressing  7/3/2025 1641 by Darcie Carr RN  Flowsheets (Taken 7/3/2025 1641)  Promote skin healing:   Assess skin/pad under line(s)/device(s)   Ensure correct size (line/device) and apply per  instructions   Rotate device position/do not position patient on device     Problem: Pain  Goal: Takes deep breaths with improved pain control throughout the shift  Outcome: Progressing  Goal: Turns in bed with improved pain control throughout the shift  Outcome: Progressing  Goal: Walks with improved pain control throughout the shift  Outcome: Progressing  Goal: Performs ADL's with improved pain control throughout  shift  Outcome: Progressing  Goal: Participates in PT with improved pain control throughout the shift  Outcome: Progressing  Goal: Free from opioid side effects throughout the shift  Outcome: Progressing  Goal: Free from acute confusion related to pain meds throughout the shift  Outcome: Progressing     Problem: Fall/Injury  Goal: Verbalize understanding of risk factor reduction measures to prevent injury from fall in the home  Outcome: Progressing  Goal: Pace activities to prevent fatigue by end of the shift  Outcome: Progressing   The patient's goals for the shift include      The clinical goals for the shift include maintain safety, free from injury/fall    Over the shift, the patient did make progress toward care plan goals.

## 2025-07-04 PROCEDURE — 2500000001 HC RX 250 WO HCPCS SELF ADMINISTERED DRUGS (ALT 637 FOR MEDICARE OP): Performed by: STUDENT IN AN ORGANIZED HEALTH CARE EDUCATION/TRAINING PROGRAM

## 2025-07-04 PROCEDURE — 2500000004 HC RX 250 GENERAL PHARMACY W/ HCPCS (ALT 636 FOR OP/ED): Performed by: STUDENT IN AN ORGANIZED HEALTH CARE EDUCATION/TRAINING PROGRAM

## 2025-07-04 PROCEDURE — 2500000005 HC RX 250 GENERAL PHARMACY W/O HCPCS: Performed by: STUDENT IN AN ORGANIZED HEALTH CARE EDUCATION/TRAINING PROGRAM

## 2025-07-04 PROCEDURE — 99232 SBSQ HOSP IP/OBS MODERATE 35: CPT | Performed by: STUDENT IN AN ORGANIZED HEALTH CARE EDUCATION/TRAINING PROGRAM

## 2025-07-04 PROCEDURE — 2500000004 HC RX 250 GENERAL PHARMACY W/ HCPCS (ALT 636 FOR OP/ED): Performed by: INTERNAL MEDICINE

## 2025-07-04 PROCEDURE — 2500000001 HC RX 250 WO HCPCS SELF ADMINISTERED DRUGS (ALT 637 FOR MEDICARE OP): Performed by: INTERNAL MEDICINE

## 2025-07-04 PROCEDURE — 1210000001 HC SEMI-PRIVATE ROOM DAILY

## 2025-07-04 RX ADMIN — ONDANSETRON 4 MG: 4 TABLET, ORALLY DISINTEGRATING ORAL at 08:18

## 2025-07-04 RX ADMIN — PANTOPRAZOLE SODIUM 40 MG: 40 TABLET, DELAYED RELEASE ORAL at 06:56

## 2025-07-04 RX ADMIN — PREDNISONE 40 MG: 20 TABLET ORAL at 08:18

## 2025-07-04 RX ADMIN — ONDANSETRON 4 MG: 4 TABLET, ORALLY DISINTEGRATING ORAL at 17:42

## 2025-07-04 RX ADMIN — OXYCODONE HYDROCHLORIDE 10 MG: 5 TABLET ORAL at 18:39

## 2025-07-04 RX ADMIN — OXYCODONE HYDROCHLORIDE 10 MG: 5 TABLET ORAL at 12:30

## 2025-07-04 RX ADMIN — OXYCODONE HYDROCHLORIDE 10 MG: 5 TABLET ORAL at 05:38

## 2025-07-04 RX ADMIN — CARBOXYMETHYLCELLULOSE SODIUM 2 DROP: 5 SOLUTION/ DROPS OPHTHALMIC at 18:40

## 2025-07-04 ASSESSMENT — COGNITIVE AND FUNCTIONAL STATUS - GENERAL
DAILY ACTIVITIY SCORE: 24
CLIMB 3 TO 5 STEPS WITH RAILING: A LITTLE
MOBILITY SCORE: 23
DAILY ACTIVITIY SCORE: 24
MOBILITY SCORE: 24

## 2025-07-04 ASSESSMENT — PAIN - FUNCTIONAL ASSESSMENT
PAIN_FUNCTIONAL_ASSESSMENT: 0-10

## 2025-07-04 ASSESSMENT — PAIN SCALES - GENERAL
PAINLEVEL_OUTOF10: 9
PAINLEVEL_OUTOF10: 5 - MODERATE PAIN
PAINLEVEL_OUTOF10: 0 - NO PAIN
PAINLEVEL_OUTOF10: 8
PAINLEVEL_OUTOF10: 8

## 2025-07-04 ASSESSMENT — PAIN DESCRIPTION - LOCATION: LOCATION: ABDOMEN

## 2025-07-04 NOTE — PROGRESS NOTES
Medical Group Progress Note  ASSESSMENT & PLAN:     Acute blood loss anemia, symptomatic  GI bleed  Ulcerative colitis  -having increased frequency of loose, bloody stool and associated abdominal pain  -Hgb has remained within her baseline and is stable; baseline ~9-10; transfused 2uPRBC in ED  -CT abd/pel showing nonspecific mild colitis from the hepatic flexure down through the rectum probably consistent with her known UC  -has mostly been on steroids previously, has refused biologics  -looks like most recently she was on mesalamine with GI at [facility]    Acute non-occlusive DVT  -involving the right extrarenal iliac vein, right common iliac vein, infrarenal IVC  -likely related to her IBD  -don't think we can fully anticoagulate at this point given her significant symptomatic anemia and GI bleeding  -will get formal bilateral duplex  -IVC filter in place, off anticoagulation  -once UC is under better control plan filter removal and initiation of DOAC     Hypokalemia  -replete     HTN  HLD  -hold antihypertensives for now     VTE Prophylaxis: held for now    7/4/25:  H/H has improved, Hgb 10.8 today  Abd pain stable  Diet advanced to Regular soft & bite sized  Repeat inflammatory markers improving, ESR WNL  -Continue prednisone taper, reduce by 10 mg per week as ordered  Plan to initiate biologic as outpt  Flex sig showing severe erythematous ulcerated mucosa in the descending colon, sigmoid colon, rectum; biopsies taken  IVC filter in place  Plan to remove IVC filter and start on anticoagulation when GI issues are under better control and hgb stable  Try to wean to oral pain meds    Medically ready for discharge to SNF with close outpt GI follow up      Joaquín Erickson,     SUBJECTIVE     No overnight events. No concerns or questions today    OBJECTIVE:     Last Recorded Vitals:  Vitals:    07/03/25 1530 07/03/25 1918 07/04/25 0423 07/04/25 0730   BP: 141/67 148/67 136/71 131/63   BP Location:  Left arm      Patient Position:  Lying     Pulse: 74 73 83 92   Resp:  18     Temp: 36.6 °C (97.9 °F) 36.1 °C (97 °F) 35.9 °C (96.6 °F) 36.7 °C (98.1 °F)   TempSrc:  Temporal     SpO2: 97% 96% 98% 95%   Weight:       Height:         Last I/O:  I/O last 3 completed shifts:  In: 360 (5.9 mL/kg) [P.O.:360]  Out: - (0 mL/kg)   Weight: 61 kg     Physical Exam    GEN: In NAD; pale  HEENT: NCAT, PERRLA, EOMI, moist mucous membranes  NECK: supple, no masses  CV: RRR, no m/r/g, no LE edema  LUNGS: CTAB, no w/r/c  ABD: soft, mild diffuse TTP without rebound or guarding  SKIN: no rashes  MSK; no gross deformities, normal joints; R groin site c/d/I no swelling  NEURO: A+Ox3, no FND  PSYCH: appropriate mood, affect    Inpatient Medications:  Scheduled Medications[1]    PRN Medications  PRN Medications[2]    Continuous Medications:  Continuous Medications[3]  LABS AND IMAGING:     Labs:  Results from last 7 days   Lab Units 07/01/25  0906 06/30/25  0545 06/29/25  0538   WBC AUTO x10*3/uL 15.6* 10.3 9.1   RBC AUTO x10*6/uL 4.40 3.51* 3.73*   HEMOGLOBIN g/dL 10.8* 8.6* 9.2*   HEMATOCRIT % 35.3* 28.1* 31.3*   MCV fL 80 80 84   MCH pg 24.5* 24.5* 24.7*   MCHC g/dL 30.6* 30.6* 29.4*   RDW % 19.9* 19.6* 20.0*   PLATELETS AUTO x10*3/uL 341 254 228     Results from last 7 days   Lab Units 06/30/25  0545 06/29/25  0539   SODIUM mmol/L 137 137   POTASSIUM mmol/L 3.9 3.8   CHLORIDE mmol/L 106 107   CO2 mmol/L 26 22   BUN mg/dL 7 6   CREATININE mg/dL 0.57 0.61   GLUCOSE mg/dL 79 87   CALCIUM mg/dL 7.8* 7.7*                 Imaging:  Flexible Sigmoidoscopy  Table formatting from the original result was not included.  Impression  Severe erythematous, ulcerated mucosa in the descending colon, sigmoid   colon and rectum in continuous fashion consistent with Smith Grade 3   colitis. Performed cold forceps biopsy to rule out superimposed CMV.     Findings  Severe, continuous erythematous and ulcerated mucosa in the descending   colon, sigmoid colon and rectum;  performed cold forceps biopsy       Recommendation  Await pathology results   Follow up with primary gastroenterologist   Continue IV solumedrol, trend daily CRP  Complete biologic workup if not recently updated         Indication  Abnormal findings on diagnostic imaging of other parts of digestive tract,   Ulcerative colitis with complication, unspecified location (Multi)    Post-Op Diagnosis  None    Staff  Staff Role   Viri Elizalde MD Proceduralist     Medications  See Anesthesia Record.     Preprocedure  A history and physical has been performed, and patient medication   allergies have been reviewed. The patient's tolerance of previous   anesthesia has been reviewed. The risks and benefits of the procedure and   the sedation options and risks were discussed with the patient. All   questions were answered and informed consent obtained.    Details of the Procedure  The patient underwent monitored anesthesia care, which was administered by   an anesthesia professional. The patient's blood pressure, heart rate,   level of consciousness, oxygen saturation, respirations, ECG and ETCO2   were monitored throughout the procedure. A digital rectal exam was   performed. The scope was introduced through the anus and advanced to the   descending colon. Retroflexion was not performed due to inflammation. The   patient's estimated blood loss was minimal. The procedure was not   difficult. The patient tolerated the procedure well. There were no   apparent adverse events.     Events  Procedure Events   Event Event Time   ENDO SCOPE IN TIME 6/26/2025  5:36 PM   ENDO SCOPE OUT TIME 6/26/2025  5:43 PM     Specimens  ID Type Source Tests Collected by Time   1 : R/O CMV, Colitis, HX of UC Tissue COLON - DESCENDING BIOPSY SURGICAL   PATHOLOGY EXAM Viri Elizalde MD 6/26/2025 1739     Procedure Location  [address] [address] [phone]    Referring  Provider  Viky May, APRN-CNP    Procedure Provider  MD Viky Garrett  Invasive vascular procedure     [facility], Cath Lab         [address] [place] [address]    Cardiovascular Catheterization Report    Patient Name:      MERVIN DENNEY   Performing           [id number] Sidra Valles                                         Physician:           MD  Study Date:        6/25/2025           Verifying Physician: Adolfo Valles MD  MRN/PID:           [id number]            Cardiologist:  Accession#:        [id number]        Ordering Provider:   [id number] ASHLEY COLBERT  Date of Birth/Age: 1955 / 69 years Fellow:  Gender:            F                   Fellow:  Encounter#:        [id number]       Study: IVC - Inferior Vena Cava Filter       Procedure Description:  After infiltration with 2% Lidocaine, the was cannulated with a modified Seldinger technique. After infiltration of local anesthetic, the right femoral vein was cannulated with a percutaneous technique. A 7 Congolese sheath was placed in the vein. Post-procedure, the venous sheath was pulled and pressure was applied to the site.     Peripheral Interventions:  IVC filter was placed under floroscopy guidance after vennogram ws done no complication.     Hemo Personnel:  +---------------+---------+  Name           Duty       +---------------+---------+  Sidra Valles MD 1  +---------------+---------+       Cardiac Cath Post Procedure Notes:  Post Procedure Diagnosis: IVC filter placement.  Blood Loss:               Estimated blood loss during the procedure was 2 mls.  Specimens Removed:        Number of specimen(s) removed: none.    ____________________________________________________________________________________  CONCLUSIONS:   1. Successful placement of IVC  filter.    ICD 10 Codes:  Personal history of thrombophlebitis-Z86.72     CPT Codes:  Venogram, IVC-68706; Insertion IVC filter-58859; Moderate Sedation Services 1st additional 15 minutes patient >5 years-63379; Moderate Sedation Services 2nd additional 15 minutes patient >5 years-08103     [id number] Sidra Valels MD  Performing Physician  Electronically signed by [id number] Valles MD on 6/25/2025 at 3:41:22 PM    ** Final **                              [1] pantoprazole, 40 mg, oral, Daily before breakfast  predniSONE, 40 mg, oral, Daily   Followed by  [START ON 7/9/2025] predniSONE, 35 mg, oral, Daily   Followed by  [START ON 7/16/2025] predniSONE, 30 mg, oral, Daily   Followed by  [START ON 7/23/2025] predniSONE, 20 mg, oral, Daily   Followed by  [START ON 7/30/2025] predniSONE, 10 mg, oral, Daily   Followed by  [START ON 8/6/2025] predniSONE, 5 mg, oral, Daily     [2] PRN medications: lubricating eye drops, ondansetron ODT **OR** ondansetron, oxyCODONE, oxyCODONE  [3]

## 2025-07-04 NOTE — CARE PLAN
The patient's goals for the shift include      The clinical goals for the shift include maintain safety, free from injury/falls    Over the shift, the patient did  Problem: Pain - Adult  Goal: Verbalizes/displays adequate comfort level or baseline comfort level  Outcome: Progressing     Problem: Discharge Planning  Goal: Discharge to home or other facility with appropriate resources  Outcome: Progressing     Problem: Chronic Conditions and Co-morbidities  Goal: Patient's chronic conditions and co-morbidity symptoms are monitored and maintained or improved  Outcome: Progressing     Problem: Nutrition  Goal: Nutrient intake appropriate for maintaining nutritional needs  Outcome: Progressing     Problem: Skin  Goal: Decreased wound size/increased tissue granulation at next dressing change  Outcome: Progressing  Goal: Prevent/minimize sheer/friction injuries  Outcome: Progressing  Goal: Promote/optimize nutrition  Outcome: Progressing  Goal: Promote skin healing  Outcome: Progressing     Problem: Pain  Goal: Takes deep breaths with improved pain control throughout the shift  Outcome: Progressing  Goal: Turns in bed with improved pain control throughout the shift  Outcome: Progressing  Goal: Walks with improved pain control throughout the shift  Outcome: Progressing  Goal: Performs ADL's with improved pain control throughout shift  Outcome: Progressing  Goal: Participates in PT with improved pain control throughout the shift  Outcome: Progressing  Goal: Free from opioid side effects throughout the shift  Outcome: Progressing  Goal: Free from acute confusion related to pain meds throughout the shift  Outcome: Progressing     Problem: Fall/Injury  Goal: Verbalize understanding of risk factor reduction measures to prevent injury from fall in the home  Outcome: Progressing  Goal: Pace activities to prevent fatigue by end of the shift  Outcome: Progressing    make progress toward the following goals.

## 2025-07-04 NOTE — CONSULTS
Nutrition Note:  RDN consult for LOS. Met with pt who reports improving appetite, denies need for ONS. Pt denies questions regarding diet at this time. RDN to follow up per policy.

## 2025-07-04 NOTE — CARE PLAN
The patient's goals for the shift include  getting some rest     The clinical goals for the shift include safety      Problem: Fall/Injury  Goal: Verbalize understanding of risk factor reduction measures to prevent injury from fall in the home  Outcome: Progressing     Problem: Skin  Goal: Decreased wound size/increased tissue granulation at next dressing change  Outcome: Progressing  Flowsheets (Taken 7/3/2025 2147)  Decreased wound size/increased tissue granulation at next dressing change: Promote sleep for wound healing     Problem: Pain - Adult  Goal: Verbalizes/displays adequate comfort level or baseline comfort level  Outcome: Progressing  Flowsheets (Taken 7/3/2025 2147)  Verbalizes/displays adequate comfort level or baseline comfort level:   Encourage patient to monitor pain and request assistance   Assess pain using appropriate pain scale

## 2025-07-05 VITALS
WEIGHT: 134.5 LBS | RESPIRATION RATE: 14 BRPM | HEART RATE: 71 BPM | BODY MASS INDEX: 26.41 KG/M2 | TEMPERATURE: 97.3 F | OXYGEN SATURATION: 95 % | DIASTOLIC BLOOD PRESSURE: 61 MMHG | HEIGHT: 60 IN | SYSTOLIC BLOOD PRESSURE: 121 MMHG

## 2025-07-05 PROCEDURE — 2500000001 HC RX 250 WO HCPCS SELF ADMINISTERED DRUGS (ALT 637 FOR MEDICARE OP): Performed by: STUDENT IN AN ORGANIZED HEALTH CARE EDUCATION/TRAINING PROGRAM

## 2025-07-05 PROCEDURE — 2500000004 HC RX 250 GENERAL PHARMACY W/ HCPCS (ALT 636 FOR OP/ED): Performed by: INTERNAL MEDICINE

## 2025-07-05 PROCEDURE — 99239 HOSP IP/OBS DSCHRG MGMT >30: CPT | Performed by: STUDENT IN AN ORGANIZED HEALTH CARE EDUCATION/TRAINING PROGRAM

## 2025-07-05 PROCEDURE — 2500000001 HC RX 250 WO HCPCS SELF ADMINISTERED DRUGS (ALT 637 FOR MEDICARE OP): Performed by: INTERNAL MEDICINE

## 2025-07-05 PROCEDURE — 97116 GAIT TRAINING THERAPY: CPT | Mod: GP,CQ

## 2025-07-05 PROCEDURE — 97530 THERAPEUTIC ACTIVITIES: CPT | Mod: GP,CQ

## 2025-07-05 PROCEDURE — 2500000004 HC RX 250 GENERAL PHARMACY W/ HCPCS (ALT 636 FOR OP/ED): Performed by: STUDENT IN AN ORGANIZED HEALTH CARE EDUCATION/TRAINING PROGRAM

## 2025-07-05 RX ORDER — OXYCODONE HYDROCHLORIDE 10 MG/1
10 TABLET ORAL EVERY 6 HOURS PRN
Qty: 15 TABLET | Refills: 0 | Status: SHIPPED | OUTPATIENT
Start: 2025-07-05

## 2025-07-05 RX ORDER — PANTOPRAZOLE SODIUM 40 MG/1
40 TABLET, DELAYED RELEASE ORAL
Start: 2025-07-06 | End: 2025-08-05

## 2025-07-05 RX ORDER — PREDNISONE 5 MG/1
TABLET ORAL
Start: 2025-07-06 | End: 2025-08-13

## 2025-07-05 RX ORDER — OXYCODONE HYDROCHLORIDE 10 MG/1
10 TABLET ORAL EVERY 6 HOURS PRN
Qty: 15 TABLET | Refills: 0 | Status: SHIPPED | OUTPATIENT
Start: 2025-07-05 | End: 2025-07-05

## 2025-07-05 RX ADMIN — OXYCODONE HYDROCHLORIDE 5 MG: 5 TABLET ORAL at 07:29

## 2025-07-05 RX ADMIN — ONDANSETRON 4 MG: 4 TABLET, ORALLY DISINTEGRATING ORAL at 08:04

## 2025-07-05 RX ADMIN — OXYCODONE HYDROCHLORIDE 10 MG: 5 TABLET ORAL at 00:05

## 2025-07-05 RX ADMIN — PANTOPRAZOLE SODIUM 40 MG: 40 TABLET, DELAYED RELEASE ORAL at 06:00

## 2025-07-05 RX ADMIN — OXYCODONE HYDROCHLORIDE 10 MG: 5 TABLET ORAL at 15:40

## 2025-07-05 RX ADMIN — PREDNISONE 40 MG: 20 TABLET ORAL at 08:05

## 2025-07-05 ASSESSMENT — PAIN - FUNCTIONAL ASSESSMENT
PAIN_FUNCTIONAL_ASSESSMENT: 0-10

## 2025-07-05 ASSESSMENT — PAIN SCALES - GENERAL
PAINLEVEL_OUTOF10: 8
PAINLEVEL_OUTOF10: 7
PAINLEVEL_OUTOF10: 7
PAINLEVEL_OUTOF10: 9
PAINLEVEL_OUTOF10: 5 - MODERATE PAIN
PAINLEVEL_OUTOF10: 5 - MODERATE PAIN

## 2025-07-05 ASSESSMENT — COGNITIVE AND FUNCTIONAL STATUS - GENERAL
TURNING FROM BACK TO SIDE WHILE IN FLAT BAD: A LITTLE
WALKING IN HOSPITAL ROOM: A LITTLE
DAILY ACTIVITIY SCORE: 24
STANDING UP FROM CHAIR USING ARMS: A LITTLE
MOBILITY SCORE: 23
MOVING TO AND FROM BED TO CHAIR: A LITTLE
CLIMB 3 TO 5 STEPS WITH RAILING: TOTAL
CLIMB 3 TO 5 STEPS WITH RAILING: A LITTLE
MOBILITY SCORE: 16
MOVING FROM LYING ON BACK TO SITTING ON SIDE OF FLAT BED WITH BEDRAILS: A LITTLE

## 2025-07-05 ASSESSMENT — PAIN DESCRIPTION - LOCATION
LOCATION: ABDOMEN

## 2025-07-05 ASSESSMENT — PAIN DESCRIPTION - ORIENTATION: ORIENTATION: LOWER

## 2025-07-05 NOTE — NURSING NOTE
Pt educated on safety measures and to use call light for assistance. Pt found on other side of room plugging phone in without walker. Pt educated on importance of calling. States she knows how to push the green button and turn off alarm if we take too long. Educated again on need to call d/t safety while in hospital.

## 2025-07-05 NOTE — PROGRESS NOTES
Physical Therapy    Physical Therapy Treatment    Patient Name: Leola Jennings  MRN: [id number]  Department: [facility]  Room: [facility]-A  Today's Date: 7/5/2025  Time Calculation  Start Time: 0912  Stop Time: 0947  Time Calculation (min): 35 min         Assessment/Plan   PT Assessment  PT Assessment Results: Decreased strength, Decreased range of motion, Decreased endurance, Impaired balance, Decreased mobility, Decreased coordination, Pain  Rehab Prognosis: Fair  Barriers to Discharge Home: Caregiver assistance, Physical needs  Caregiver Assistance: Patient lives alone and/or does not have reliable caregiver assistance  Physical Needs: 24hr mobility assistance needed, 24hr ADL assistance needed  End of Session Communication: Bedside nurse  Assessment Comment: Pt is making progress toward goals and reports feeling stronger. Pt will benefit from continuted PT to further progress strength, endurance and functional mobility.  End of Session Patient Position: Up in chair, Alarm on  PT Plan  Inpatient/Swing Bed or Outpatient: Inpatient  PT Plan  Treatment/Interventions: Bed mobility, Gait training, Transfer training  PT Plan: Ongoing PT  PT Frequency: 4 times per week  PT Discharge Recommendations: Moderate intensity level of continued care, High intensity level of continued care  PT Recommended Transfer Status: Assist x1, Total assist      PT Visit Info:  PT Received On: 07/05/25     General Visit Information:   General  Reason for Referral: Dx: anemia IVC thrombosis  Referred By: Micaela OT/PT 6/25  Past Medical History Relevant to Rehab: celiacs, ulcerative colitis, RA, HLD, HTN  Prior to Session Communication: Bedside nurse, PCT/NA/CTA  Patient Position Received: Bed, 2 rail up, Alarm on  General Comment: Pt agreeable to treatment with PT.    Subjective   Precautions:  Precautions  Medical Precautions: Fall precautions            Objective   Pain:  Pain Assessment  Pain Assessment: 0-10  0-10 (Numeric) Pain Score:  7  Pain Type: Chronic pain, Acute pain  Pain Location: Abdomen  Cognition:  Cognition  Orientation Level: Oriented X4  Coordination:     Postural Control:     Extremity/Trunk Assessments:        Activity Tolerance:  Activity Tolerance  Endurance: Decreased tolerance for upright activites  Treatments:  Therapeutic Exercise  Therapeutic Exercise Performed: No    Bed Mobility  Bed Mobility: Yes (Sup<>sit at SBA level with no use of rails.)    Ambulation/Gait Training  Ambulation/Gait Training Performed: Yes (Pt ambulating 200' with WW/gait belt and CGA with a slow, reciprocal gait. Pt demos improvement with no BLE buckling noted this session.)  Transfers  Transfer: Yes (Sit<>stand transfer from EOB with CGA and WW.)    Outcome Measures:  [facility] Basic Mobility  Turning from your back to your side while in a flat bed without using bedrails: A little  Moving from lying on your back to sitting on the side of a flat bed without using bedrails: A little  Moving to and from bed to chair (including a wheelchair): A little  Standing up from a chair using your arms (e.g. wheelchair or bedside chair): A little  To walk in hospital room: A little  Climbing 3-5 steps with railing: Total  Basic Mobility - Total Score: 16    Education Documentation  Mobility Training, taught by Rosalba Ga PTA at 7/5/2025  1:56 PM.  Learner: Patient  Readiness: Acceptance  Method: Explanation  Response: Verbalizes Understanding    Education Comments  No comments found.        EDUCATION:  Outpatient Education  Individual(s) Educated: Patient  Education Provided: Body Mechanics, Fall Risk, Home Exercise Program, Home Safety    Encounter Problems       Encounter Problems (Active)       PT Problem       Pt will demonstrate min A with bed mobility to edge of bed.   (Progressing)       Start:  06/27/25    Expected End:  07/11/25            Pt will demonstrate min A with sit to stand/chair transfers with FWW.   (Progressing)       Start:  06/27/25     Expected End:  07/11/25            Pt will ambulate 30 feet with FWW min A .   (Progressing)       Start:  06/27/25    Expected End:  07/11/25            Pt to demo improved BLE strength by being able to complete supine/seated thera ex 2x20 BLEs with 4 or less rest breaks .   (Progressing)       Start:  06/27/25    Expected End:  07/11/25               Pain - Adult

## 2025-07-05 NOTE — CARE PLAN
Problem: Pain - Adult  Goal: Verbalizes/displays adequate comfort level or baseline comfort level  Outcome: Progressing     Problem: Discharge Planning  Goal: Discharge to home or other facility with appropriate resources  Outcome: Progressing     Problem: Chronic Conditions and Co-morbidities  Goal: Patient's chronic conditions and co-morbidity symptoms are monitored and maintained or improved  Outcome: Progressing     Problem: Nutrition  Goal: Nutrient intake appropriate for maintaining nutritional needs  Outcome: Progressing     Problem: Skin  Goal: Decreased wound size/increased tissue granulation at next dressing change  Outcome: Progressing  Goal: Prevent/minimize sheer/friction injuries  Outcome: Progressing  Goal: Promote/optimize nutrition  Outcome: Progressing  Goal: Promote skin healing  Outcome: Progressing     Problem: Pain  Goal: Takes deep breaths with improved pain control throughout the shift  Outcome: Progressing  Goal: Turns in bed with improved pain control throughout the shift  Outcome: Progressing  Goal: Walks with improved pain control throughout the shift  Outcome: Progressing  Goal: Performs ADL's with improved pain control throughout shift  Outcome: Progressing  Goal: Participates in PT with improved pain control throughout the shift  Outcome: Progressing  Goal: Free from opioid side effects throughout the shift  Outcome: Progressing  Goal: Free from acute confusion related to pain meds throughout the shift  Outcome: Progressing   The patient's goals for the shift include rest     The clinical goals for the shift include Patient will remain free from falls throughout shift

## 2025-07-05 NOTE — DISCHARGE SUMMARY
Discharge Diagnosis  Blood loss anemia       Issues Requiring Follow-Up  GI follow-up    Discharge Meds     Medication List      START taking these medications     oxyCODONE 10 mg immediate release tablet; Commonly known as: Roxicodone;   Take 1 tablet (10 mg) by mouth every 6 hours if needed for severe pain (7   - 10).   pantoprazole 40 mg EC tablet; Commonly known as: ProtoNix; Take 1 tablet   (40 mg) by mouth once daily in the morning. Take before meals. Do not   crush, chew, or split.; Start taking on: July 6, 2025   predniSONE 5 mg tablet; Commonly known as: Deltasone; Take 8 tablets (40   mg) by mouth once daily for 3 days, THEN 7 tablets (35 mg) once daily for   7 days, THEN 6 tablets (30 mg) once daily for 7 days, THEN 4 tablets (20   mg) once daily for 7 days, THEN 2 tablets (10 mg) once daily for 7 days,   THEN 1 tablet (5 mg) once daily for 7 days.; Start taking on: July 6, 2025     CONTINUE taking these medications     ondansetron ODT 4 mg disintegrating tablet; Commonly known as:   Zofran-ODT; Please melt 1 tablet on tongue every 6 hours as needed for   nausea.  Lots of fluids please throughout the day.  Thank you.   Zenpep 40,000-126,000- 168,000 unit capsule; Generic drug:   lipase-protease-amylase; Please take 2 capsules by mouth before meals 3   times a day, and please take 1 capsule by mouth before snacks twice a day.    Lots of fluids please throughout the day.  Thank you.       Test Results Pending At Discharge  Pending Labs       Order Current Status    Surgical Pathology Exam In [facility]            Hospital Course  Leola Jennings is a 69 y.o. female with a significant past medical history of ulcerative colitis, hypertension, question of rheumatoid arthritis, hyperlipidemia, who presents to the emergency department with 2 weeks of progressive abdominal pain, bloody and mucousy stools.    Acute blood loss anemia, symptomatic  GI bleed  Ulcerative colitis  Acute non-occlusive DVT     -H/H  stable, it was last 10.8 and was no longer trended due to being stable >1 week  - Abd pain stable on oxycodone regimen  - Diet advanced to Regular soft & bite sized  -Continue prednisone taper, reduce by 10 mg per week as ordered  - Plan to initiate biologic as outpt  - Flex sig showing severe erythematous ulcerated mucosa in the descending colon, sigmoid colon, rectum; biopsies taken  - IVC filter in place  - Plan to remove IVC filter and start on anticoagulation when GI issues are under better control and hgb stable    > 30min in discharge coordination of care which includes: discharge planning, medication reconciliation, arranging appropriate follow up and discussion of discharge instructions with the patient.      Pertinent Physical Exam At Time of Discharge  Physical Exam  Vitals and nursing note reviewed.   Constitutional:       General: She is not in acute distress.     Appearance: Normal appearance. She is normal weight.   HENT:      Head: Normocephalic and atraumatic.      Mouth/Throat:      Mouth: Mucous membranes are moist.   Eyes:      Extraocular Movements: Extraocular movements intact.      Conjunctiva/sclera: Conjunctivae normal.   Cardiovascular:      Rate and Rhythm: Normal rate and regular rhythm.      Pulses: Normal pulses.      Heart sounds: Normal heart sounds.   Pulmonary:      Effort: Pulmonary effort is normal.      Breath sounds: Normal breath sounds.   Abdominal:      General: Abdomen is flat. Bowel sounds are normal. There is no distension.      Palpations: Abdomen is soft.   Musculoskeletal:      Right lower leg: No edema.      Left lower leg: No edema.   Skin:     General: Skin is warm.   Neurological:      General: No focal deficit present.      Mental Status: She is alert and oriented to person, place, and time.      Motor: Weakness present.         Outpatient Follow-Up  Future Appointments   Date Time Provider Department Center   7/31/2025  1:00 PM Luci Hernandez PA-C [id number] [place]          Joaquín Erickson, DO

## 2025-07-05 NOTE — PROGRESS NOTES
Plan is for the [facility] snf, they have received ins. Precert and good through 7/9.  Physician  aware, will await final discharge orders.

## 2025-07-08 ENCOUNTER — TELEPHONE (OUTPATIENT)
Dept: GASTROENTEROLOGY | Facility: CLINIC | Age: 70
End: 2025-07-08
Payer: MEDICARE

## 2025-07-08 LAB
LAB AP ASR DISCLAIMER: NORMAL
LABORATORY COMMENT REPORT: NORMAL
PATH REPORT.FINAL DX SPEC: NORMAL
PATH REPORT.GROSS SPEC: NORMAL
PATH REPORT.RELEVANT HX SPEC: NORMAL
PATH REPORT.TOTAL CANCER: NORMAL

## 2025-07-08 NOTE — TELEPHONE ENCOUNTER
I left a message with Rosalba at the [facility] to call [phone] to schedule a follow up appointment with Dr. Cha.  ([phone] Ext. [phone], Rosalba)

## 2025-07-11 ENCOUNTER — APPOINTMENT (OUTPATIENT)
Dept: PRIMARY CARE | Facility: CLINIC | Age: 70
End: 2025-07-11
Payer: MEDICARE

## 2025-07-11 ENCOUNTER — TELEPHONE (OUTPATIENT)
Dept: GASTROENTEROLOGY | Facility: CLINIC | Age: 70
End: 2025-07-11

## 2025-07-11 NOTE — TELEPHONE ENCOUNTER
Patient has been approved for Entyvio infusions to be administered at the [facility]. She is currently residing in a skilled nursing facility following a recent hospital discharge.     During today's call, patient expressed concern about the cost of Entyvio. She is currently on narcotic medication , which she reports is affecting her mental clarity and making it difficult to have a clear conversation about her ulcerative colitis management at this time.     Patient has Medicare and is not eligible for commercial co-pay assist programs. I offered to send a patient assistance program application to a family member to help complete it, but she stated she does not have anyone available to assist.     She has a follow up appointment scheduled with Dr. Cha on 7/22, where she would like to discuss her healthcare status (blood clots, difficulty with vein access) and Entyvio treatment further. I informed her that I will have the patient assistance application ready at that visit.    Status: Admitted 10/9 from OSH w/ respiratory distress  Vitals: VSS  Neuros: A/Ox4, Str 5/5, wears glasses at baseline  IV: PIV SL   Resp/trach: RA   Diet: regular   Bowel status: LBM 10/17  : Voiding spontaneously, urinal at bedside   Skin: BLE venous stasis ulcers, covered   Pain: denies   Activity: Ax1, GB, walker  Plan: Continue to monitor, pending discharge

## 2025-07-14 PROBLEM — D53.9 ANEMIA, DEFICIENCY: Status: ACTIVE | Noted: 2025-07-14

## 2025-07-14 NOTE — TELEPHONE ENCOUNTER
Patient is aware. Lab ordered.  
Patient is calling because she states you told her to call you with an update. She saw you on 3/1/24 for leg swelling. She started taking the water pill that day along with a half a banana and you told her that she needed to give it a few days to see how it was working. She states the swelling in her feet is coming down a lot. She states the left is almost back to normal and the right is still a little puffy like it was in January but not as painful. She states the bloody diarrhea is the same. She is aware that you are out of the office until Monday (you had told her you would be).  She states that since nothing is urgent she wanted to leave this message with you instead of another provider. Wanted to make you aware of the update  EDI    Patient's # [phone]  
Tried to call patient several times. Phone does not ring. Will try again later.    Stewart Mariee MD  [id number] [place] [address] Clinical Support Staff1 hour ago (8:01 AM)       Please let her know I am glad her swelling is doing better.  That being said I would recommend she start taking the water pill every other day to see what effect that has.  Also please arrange for BMP with diagnosis of leg swelling to be done in a week to make sure her potassium and kidney function is okay.  She should follow-up with me in a week or 2 to go over everything.  Please let her know and arrange     
no

## 2025-07-17 ENCOUNTER — TELEPHONE (OUTPATIENT)
Dept: GASTROENTEROLOGY | Facility: CLINIC | Age: 70
End: 2025-07-17
Payer: MEDICARE

## 2025-07-17 DIAGNOSIS — K51.90 ULCERATIVE COLITIS WITHOUT COMPLICATIONS, UNSPECIFIED LOCATION (MULTI): Primary | ICD-10-CM

## 2025-07-17 NOTE — TELEPHONE ENCOUNTER
Patient was discharged from nursing home today. She was sent home without her rx for  Prednisone.    She is asking if you can call her in the rest of the medication.   She was sent home with a written note that states she should take 6, 5 mg tablets daily until 7/23, then decrease as prescribed.    I can call it in if you would like me to.... to VAMSI Salazar.  Thanks, Taylor

## 2025-07-18 ENCOUNTER — PATIENT OUTREACH (OUTPATIENT)
Dept: PRIMARY CARE | Facility: CLINIC | Age: 70
End: 2025-07-18
Payer: MEDICARE

## 2025-07-18 LAB
ATRIAL RATE: 93 BPM
P AXIS: 71 DEGREES
P OFFSET: 178 MS
P ONSET: 127 MS
PR INTERVAL: 164 MS
Q ONSET: 209 MS
QRS COUNT: 15 BEATS
QRS DURATION: 92 MS
QT INTERVAL: 396 MS
QTC CALCULATION(BAZETT): 492 MS
QTC FREDERICIA: 458 MS
R AXIS: 12 DEGREES
T AXIS: 47 DEGREES
T OFFSET: 407 MS
VENTRICULAR RATE: 93 BPM

## 2025-07-18 RX ORDER — PREDNISONE 5 MG/1
TABLET ORAL
Qty: 36 TABLET | Refills: 0 | Status: SHIPPED | OUTPATIENT
Start: 2025-07-18 | End: 2025-07-28

## 2025-07-18 NOTE — TELEPHONE ENCOUNTER
Patient aware enough medication (Prednisone) for one week sent to DM at [facility].  Keep 7/22/ appt with Dr Cha.

## 2025-07-18 NOTE — PROGRESS NOTES
Discharge Facility: [facility]  Discharge Diagnosis: Blood loss anemia   Admission Date: 7/5/25  Discharge Date: 7/17/25    PCP Appointment Date: tasked to office  Specialist Appointment Date: VIVIAN Cha  Hospital Encounter and Summary Linked: Yes  ED to Hosp-Admission (Discharged) with Joaquín Erickson DO; Ismael HARDWICK MD (06/24/2025)     See discharge assessment below for further details     Wrap Up  Wrap Up Additional Comments: This CM had a brief conversation with patient via phone. Successful transition of care outreach complete. Pt reports doing well at home since discharge. New meds reviewed. Pt denies any prescription medication questions. Pt reports there was a problem with her medications from the SNF and she was able to receive all of her medications as of today. Patient denies any further discharge questions/needs at this time. Emphasized that Follow up is needed after discharge to review the hospital recommendations, assess your response to your treatment. Tasked the office for a PCP follow up appointment. Pt aware of my availability for non-emergent concerns. Contact info provided to patient. (7/18/2025  2:55 PM)    Medications  Medications reviewed with patient/caregiver?: Yes (7/18/2025  2:55 PM)  Is the patient having any side effects they believe may be caused by any medication additions or changes?: No (7/18/2025  2:55 PM)  Does the patient have all medications ordered at discharge?: Yes (7/18/2025  2:55 PM)  Prescription Comments: no updated medication ist available in Epic (7/18/2025  2:55 PM)  Is the patient taking all medications as directed (includes completed medication regime)?: Yes (7/18/2025  2:55 PM)  Medication Comments: no noted issues obtaining medications (7/18/2025  2:55 PM)    Appointments  Does the patient have a primary care provider?: Yes (7/18/2025  2:55 PM)  Care Management Interventions: Educated patient on importance of making appointment; Advised  patient to make appointment (7/18/2025  2:55 PM)  Has the patient kept scheduled appointments due by today?: Yes (7/18/2025  2:55 PM)  Care Management Interventions: Advised patient to keep appointment (7/18/2025  2:55 PM)    Self Management  What is the home health agency?: [facility] (7/18/2025  2:55 PM)  Has home health visited the patient within 72 hours of discharge?: Yes (7/18/2025  2:55 PM)  What Durable Medical Equipment (DME) was ordered?: walker (7/18/2025  2:55 PM)    Patient Teaching  Does the patient have access to their discharge instructions?: Yes (7/18/2025  2:55 PM)  What is the patient's perception of their health status since discharge?: Improving (7/18/2025  2:55 PM)  Is the patient/caregiver able to teach back the hierarchy of who to call/visit for symptoms/problems? PCP, Specialist, Home Health nurse, Urgent Care, ED, 911: Yes (7/18/2025  2:55 PM)

## 2025-07-18 NOTE — TELEPHONE ENCOUNTER
I gave her enough to last to her appointment.  Advise her I won't refill if she cancels the appointment.   Emanuel Cha MD

## 2025-07-21 ENCOUNTER — RESULTS FOLLOW-UP (OUTPATIENT)
Dept: GASTROENTEROLOGY | Facility: CLINIC | Age: 70
End: 2025-07-21
Payer: MEDICARE

## 2025-07-21 ENCOUNTER — TELEPHONE (OUTPATIENT)
Dept: PRIMARY CARE | Facility: CLINIC | Age: 70
End: 2025-07-21
Payer: MEDICARE

## 2025-07-21 NOTE — TELEPHONE ENCOUNTER
Called patient to relay biopsy results. Stressed importance of pursuing escalated therapy to treat active UC. She is in agreement with this. She is aware of scheduled clinic follow up with Dr. Cha tomorrow.    Viri Elizalde MD

## 2025-07-21 NOTE — LETTER
July 21, 2025     Leola Jennings  [address]  [place] [address]      Dear Ms. Jennings:    The final pathology report on biopsies of your colon showed ongoing inflammation related to your history of ulcerative colitis. There were no findings of superimposed infection or precancerous changes.    As we discussed previously, I recommend following up with your primary gastroenterologist as is scheduled to discuss next steps in treatment. Steroid therapy is not a maintenance option for management of Ulcerative Colitis and results in serious side effects when used long term.     If you have any questions or concerns, please do not hesitate to call.         Sincerely,        Viri Elizalde MD        CC: Emanuel Cha MD

## 2025-07-22 ENCOUNTER — HOSPITAL ENCOUNTER (OUTPATIENT)
Dept: RADIOLOGY | Facility: HOSPITAL | Age: 70
Discharge: HOME | End: 2025-07-22
Payer: MEDICARE

## 2025-07-22 ENCOUNTER — APPOINTMENT (OUTPATIENT)
Dept: GASTROENTEROLOGY | Facility: CLINIC | Age: 70
End: 2025-07-22
Payer: MEDICARE

## 2025-07-22 VITALS
BODY MASS INDEX: 24.74 KG/M2 | DIASTOLIC BLOOD PRESSURE: 87 MMHG | OXYGEN SATURATION: 96 % | HEIGHT: 60 IN | HEART RATE: 83 BPM | SYSTOLIC BLOOD PRESSURE: 110 MMHG | WEIGHT: 126 LBS

## 2025-07-22 DIAGNOSIS — Z95.828 PRESENCE OF IVC FILTER: ICD-10-CM

## 2025-07-22 DIAGNOSIS — K51.90 ULCERATIVE COLITIS WITHOUT COMPLICATIONS, UNSPECIFIED LOCATION (MULTI): Primary | ICD-10-CM

## 2025-07-22 PROCEDURE — 1159F MED LIST DOCD IN RCRD: CPT | Performed by: INTERNAL MEDICINE

## 2025-07-22 PROCEDURE — 74021 RADEX ABDOMEN 3+ VIEWS: CPT | Performed by: RADIOLOGY

## 2025-07-22 PROCEDURE — 74019 RADEX ABDOMEN 2 VIEWS: CPT

## 2025-07-22 PROCEDURE — 99214 OFFICE O/P EST MOD 30 MIN: CPT | Performed by: INTERNAL MEDICINE

## 2025-07-22 PROCEDURE — 3008F BODY MASS INDEX DOCD: CPT | Performed by: INTERNAL MEDICINE

## 2025-07-22 PROCEDURE — 1111F DSCHRG MED/CURRENT MED MERGE: CPT | Performed by: INTERNAL MEDICINE

## 2025-07-22 RX ORDER — PREDNISONE 5 MG/1
TABLET ORAL
Qty: 180 TABLET | Refills: 1 | Status: SHIPPED | OUTPATIENT
Start: 2025-07-22 | End: 2025-08-21

## 2025-07-22 NOTE — PROGRESS NOTES
Gastroenterology Office Visit     History of Present Illness:   Leola Jennings is a 70 y.o. female who presents to GI clinic for follow up of UC, dx'd in 7/19.  Since last OV with me in 5/20, patient has seen multiple other GI providers at  and [facility]; she has declined starting biologics multiple times since her last OV with me.  I had recommended Entyvio in 2020.     Admitted in 6/25 for UC flare, TIAN ((baseline Hb 13.1 in 12/23, 6.5 on admission) and acute DVT.  Treated with IVC filter and iv steroids.  Was not on any maintenance for UC on admission.  Was managing flares with intermittent prednisone tapers.  Discharged on steroids, now on 30 mg daily.  Patient willing to try biologic now.      Has concerns paying for Entyvio.     She is still having 10-12 bowel movements daily while taking prednisone 30 mg daily.  There is less blood.  She is still having left lower quadrant pain.  The stool now has pudding consistency.  The blood does not occur with every bowel movement.    She has vascular medicine follow-up next week, and tells me they want to take out her IVC filter, and start anticoagulation.  I do not think this is a good idea at this point until we get the colitis under control.    OV in 7/19:  This is a 64-year-old female who was recently admitted to [facility] from 7/18/19-7/22/19 for 3 weeks of rectal bleeding (large amounts of BRB), lower abdominal pain and diarrhea (stools too numerous to count). A flexible sigmoidoscopy showed inflammation from 0-20 centimeters in the rectosigmoid colon. Biopsies confirmed chronic active colitis in this area.     Review of Systems  Constitutional: denies fever/ chills, night sweats, wt loss and fatigue  Respiratory: denies SOB, BLUM  CV: denies chest pain and LE edema  Neuro: denies weakness and difficulty walking      Past Medical History   has a past medical history of Acute vaginitis (05/23/2019), Celiac disease ([facility]), Cigarette nicotine dependence without  complication (08/15/2023), Encounter for general adult medical examination without abnormal findings (05/23/2019), Encounter for screening for malignant neoplasm of colon (03/15/2021), Nonscarring hair loss, unspecified (08/09/2019), Personal history of other diseases of the digestive system (03/11/2020), Personal history of other diseases of the musculoskeletal system and connective tissue, Personal history of other diseases of the respiratory system (04/08/2020), Personal history of other diseases of the respiratory system (04/22/2020), and Personal history of other specified conditions (07/15/2019).     Problem List  Problem List[1]    Past Surgical History  Surgical History[2]    Social History   reports that she has quit smoking. Her smoking use included cigarettes. She started smoking about 6 years ago. She has never used smokeless tobacco. She reports that she does not currently use alcohol. She reports that she does not use drugs.     Family History  family history includes Hashimoto's thyroiditis in her sister; Heart attack in her father; Heart failure in her mother and sister; Hypertension in her mother; Lupus in her sister; Obesity in her sister; Seizures in her sister; Spondylolysis in her brother and mother; pace maker in her father.       Allergies  RX Allergies[3]    Medications  Current Outpatient Medications   Medication Instructions    lipase-protease-amylase (Zenpep) 40,000-126,000- 168,000 unit capsule Please take 2 capsules by mouth before meals 3 times a day, and please take 1 capsule by mouth before snacks twice a day.  Lots of fluids please throughout the day.  Thank you.    ondansetron ODT (Zofran-ODT) 4 mg disintegrating tablet Please melt 1 tablet on tongue every 6 hours as needed for nausea.  Lots of fluids please throughout the day.  Thank you.    oxyCODONE (ROXICODONE) 10 mg, oral, Every 6 hours PRN    pantoprazole (PROTONIX) 40 mg, oral, Daily before breakfast, Do not crush, chew, or  split.    predniSONE (Deltasone) 5 mg tablet Take 6 tablets by mouth daily        Objective   Blood pressure 110/87, pulse 83, height (!) 1.524 m (5'), weight 57.2 kg (126 lb), SpO2 96%.      General: no acute distress, well-nourished  Skin: no jaundice, rash or liver stigmata; diffuse ecchymoses on UE  Respiratory: normal breath sounds bilaterally, no wheezes or rales  CV: RRR, no murmurs; no LE edema    LABS    Component      Latest Ref Rng 6/26/2025   Calprotectin, Stool      <=49 ug/g >3000 (H)       Component      Latest Ref Rng 6/25/2025   T-SPOT. TB Interpretation      Negative  Negative    Panel A Spot Count 0    Panel B Spot Count 0    NIL(NEG) Control Spot Count Passed    POS Control Spot Count Passed    Hepatitis B Surface AB      <10.0 mIU/mL 91.1 (H)       Component      Latest Ref Rng 7/1/2025   WBC      4.4 - 11.3 x10*3/uL 15.6 (H)    nRBC      0.0 - 0.0 /100 WBCs 0.0    RBC      4.00 - 5.20 x10*6/uL 4.40    HEMOGLOBIN      12.0 - 16.0 g/dL 10.8 (L)    HEMATOCRIT      36.0 - 46.0 % 35.3 (L)    MCV      80 - 100 fL 80    MCH      26.0 - 34.0 pg 24.5 (L)    MCHC      32.0 - 36.0 g/dL 30.6 (L)    RED CELL DISTRIBUTION WIDTH      11.5 - 14.5 % 19.9 (H)    Platelets      150 - 450 x10*3/uL 341       Component      Latest Ref Rng 6/27/2025   Albumin      3.4 - 5.0 g/dL 2.7 (L)    Alkaline Phosphatase      33 - 136 U/L 76    Total Protein      6.4 - 8.2 g/dL 6.3 (L)    AST      9 - 39 U/L 12    Bilirubin Total      0.0 - 1.2 mg/dL 0.5    ALT      7 - 45 U/L 10       Vit D 19    Radiology  CT A/P 6/25: Acute nonocclusive deep venous thrombosis involving the right  external iliac vein, right common iliac vein, and the infrarenal IVC.  Please see above for details.      Nonspecific mild colitis extending from the hepatic flexure down  through the rectum.    Endoscopy    Flex sig 6/25: severe inflammation to proximal limit of exam (descending colon); bx showed CAC, negative CMV    Assessment/Plan   Leola  Dick is a 70 y.o. female who presents to GI clinic for UC- was proctosigmoiditis when diagnosed in 2019, now likely pancolitis.  IM and Biologic naive.     Ulcerative colitis without complications (Multi)  Get labs as ordered.  Continue prednisone 30 mg daily.  We will try to get the Entyvio approved through patient assistance.  Return to clinic in 1 month.     Vitamin D Deficiency    Acute DVT, s/p IVC filter in 6/25      Emanuel Cha MD            [1]   Patient Active Problem List  Diagnosis    Adult celiac disease (HHS-HCC)    Bilateral leg edema    Keratitis sicca, bilateral    Fatigue    Rheumatoid arthritis involving multiple sites with positive rheumatoid factor (Multi)    Ulcerative rectosigmoiditis with rectal bleeding (Multi)    Deep vein thrombosis (DVT) of proximal lower extremity, unspecified chronicity, unspecified laterality    Hyperparathyroidism (Multi)    Angina at rest    BMI 26.0-26.9,adult    Inflammatory bowel disease    Iron deficiency anemia    Mixed hyperlipidemia    Hyperglycemia    Polyarthralgia    Acute pancreatitis    Blood pressure elevated without history of HTN    Prediabetes    [place] cardiac risk <10% in next 10 years    Vitamin D deficiency    Blood loss anemia    IVC thrombosis (Multi)    Anemia, deficiency    Ulcerative colitis without complications (Multi)   [2]   Past Surgical History:  Procedure Laterality Date    CARDIAC CATHETERIZATION N/A 6/25/2025    Procedure: IVC Filter Insertion;  Surgeon: Sidra Valles MD;  Location: [facility];  Service: Cardiovascular;  Laterality: N/A;    OTHER SURGICAL HISTORY  03/13/2017    Parathyroid    OTHER SURGICAL HISTORY  07/15/2019    Colonoscopy   [3] No Known Allergies

## 2025-07-22 NOTE — PATIENT INSTRUCTIONS
Get labs as ordered.  Continue prednisone 30 mg daily.  We will try to get the Entyvio approved through patient assistance.  Return to clinic in 1 month.

## 2025-07-23 ENCOUNTER — TELEPHONE (OUTPATIENT)
Dept: GASTROENTEROLOGY | Facility: CLINIC | Age: 70
End: 2025-07-23
Payer: MEDICARE

## 2025-07-23 LAB
HBV CORE AB SERPL QL IA: NORMAL
HBV SURFACE AG SERPL QL IA: NORMAL

## 2025-07-24 ENCOUNTER — TELEPHONE (OUTPATIENT)
Dept: GASTROENTEROLOGY | Facility: CLINIC | Age: 70
End: 2025-07-24
Payer: MEDICARE

## 2025-07-24 NOTE — TELEPHONE ENCOUNTER
Entyvio PAP raza faxed.   
Patient called in . She states [employer] called her and they are pushing her application forward for review.   
No
stand-by assist

## 2025-07-24 NOTE — TELEPHONE ENCOUNTER
"Patient called in and left a voicemail stating that \"she needed to leave a message for the infusion girl here\" but did not leave any message other than that or any other specifics. She can be reached back at [phone]  Thank you.  "

## 2025-07-28 ENCOUNTER — APPOINTMENT (OUTPATIENT)
Dept: PRIMARY CARE | Facility: CLINIC | Age: 70
End: 2025-07-28
Payer: MEDICARE

## 2025-07-28 VITALS
DIASTOLIC BLOOD PRESSURE: 73 MMHG | HEIGHT: 60 IN | OXYGEN SATURATION: 98 % | HEART RATE: 89 BPM | SYSTOLIC BLOOD PRESSURE: 123 MMHG | WEIGHT: 126.7 LBS | BODY MASS INDEX: 24.87 KG/M2

## 2025-07-28 DIAGNOSIS — D50.9 IRON DEFICIENCY ANEMIA, UNSPECIFIED IRON DEFICIENCY ANEMIA TYPE: ICD-10-CM

## 2025-07-28 DIAGNOSIS — E78.2 MIXED HYPERLIPIDEMIA: ICD-10-CM

## 2025-07-28 DIAGNOSIS — L03.116 CELLULITIS OF BOTH FEET: ICD-10-CM

## 2025-07-28 DIAGNOSIS — D68.59 HYPERCOAGULABLE STATE (MULTI): ICD-10-CM

## 2025-07-28 DIAGNOSIS — N30.00 ACUTE CYSTITIS WITHOUT HEMATURIA: ICD-10-CM

## 2025-07-28 DIAGNOSIS — R73.03 PREDIABETES: ICD-10-CM

## 2025-07-28 DIAGNOSIS — E46 PROTEIN MALNUTRITION (MULTI): ICD-10-CM

## 2025-07-28 DIAGNOSIS — H16.223 KERATITIS SICCA, BILATERAL: ICD-10-CM

## 2025-07-28 DIAGNOSIS — K85.90 ACUTE PANCREATITIS, UNSPECIFIED COMPLICATION STATUS, UNSPECIFIED PANCREATITIS TYPE (HHS-HCC): ICD-10-CM

## 2025-07-28 DIAGNOSIS — D62 ACUTE BLOOD LOSS ANEMIA: Primary | ICD-10-CM

## 2025-07-28 DIAGNOSIS — L03.115 CELLULITIS OF BOTH FEET: ICD-10-CM

## 2025-07-28 DIAGNOSIS — R61 NIGHT SWEATS: ICD-10-CM

## 2025-07-28 DIAGNOSIS — K51.311 ULCERATIVE RECTOSIGMOIDITIS WITH RECTAL BLEEDING (MULTI): ICD-10-CM

## 2025-07-28 DIAGNOSIS — E55.9 VITAMIN D DEFICIENCY: ICD-10-CM

## 2025-07-28 DIAGNOSIS — R82.998 CALCIUM OXALATE CRYSTALS IN URINE: ICD-10-CM

## 2025-07-28 DIAGNOSIS — Z91.89 FRAMINGHAM CARDIAC RISK <10% IN NEXT 10 YEARS: Chronic | ICD-10-CM

## 2025-07-28 PROBLEM — I82.4Y3 ACUTE DEEP VEIN THROMBOSIS (DVT) OF PROXIMAL VEIN OF BOTH LOWER EXTREMITIES: Status: ACTIVE | Noted: 2023-08-16

## 2025-07-28 PROCEDURE — 99495 TRANSJ CARE MGMT MOD F2F 14D: CPT | Performed by: INTERNAL MEDICINE

## 2025-07-28 PROCEDURE — 1111F DSCHRG MED/CURRENT MED MERGE: CPT | Performed by: INTERNAL MEDICINE

## 2025-07-28 PROCEDURE — 1159F MED LIST DOCD IN RCRD: CPT | Performed by: INTERNAL MEDICINE

## 2025-07-28 PROCEDURE — 1160F RVW MEDS BY RX/DR IN RCRD: CPT | Performed by: INTERNAL MEDICINE

## 2025-07-28 PROCEDURE — 3008F BODY MASS INDEX DOCD: CPT | Performed by: INTERNAL MEDICINE

## 2025-07-28 RX ORDER — DOXYCYCLINE 100 MG/1
1 CAPSULE ORAL
COMMUNITY
Start: 2025-07-18

## 2025-07-28 ASSESSMENT — ENCOUNTER SYMPTOMS
DEPRESSION: 0
OCCASIONAL FEELINGS OF UNSTEADINESS: 0
LOSS OF SENSATION IN FEET: 0

## 2025-07-28 ASSESSMENT — PATIENT HEALTH QUESTIONNAIRE - PHQ9
SUM OF ALL RESPONSES TO PHQ9 QUESTIONS 1 AND 2: 0
2. FEELING DOWN, DEPRESSED OR HOPELESS: NOT AT ALL
1. LITTLE INTEREST OR PLEASURE IN DOING THINGS: NOT AT ALL

## 2025-07-28 NOTE — PATIENT INSTRUCTIONS
Thank you very much for coming.  I am very happy to see you again.    I do understand that you have had trouble with your legs and FEET, and that they are still painful.  You did just finish ANTIBIOTICS.  I am glad that you tolerated your regimens.  Please call me sooner if your legs become red and warm and painful.  You may need another bout of antibiotics.    Until then, let us wait a week and let your medications settle into your system.  We can then repeat NONFASTING BLOOD and URINE examinations, then see me soon after, so that we can review how you are doing.    With your history of DIARRHEA and ANTIBIOTIC USE, it would be very helpful for you to be on a PROBIOTIC.  Could you please get yourself some ALIGN CAPSULE with LUNCH?  You can get this the cheapest from [employer] or [employer].  Please take this with LUNCH every day.  This will help your immune system get better, and it will also slow down your diarrhea eventually.    Please keep your appointments with GI, Dr. Cha.  He has been very helpful!    Let me study your chart tonight and see if you need more treatment regarding the BLOOD CLOTS that you developed.    With your history of NIGHT SWEATS, let us reevaluate how you are doing in about 10 days, and if needed, there is a medication that we can try for NIGHT SWEATS.  I even have samples.  Let us wait until you return, and if symptoms are persistent, then we should treat this with a medication called VEOZAH.  I have samples that we can try.    Get yourself some over-the-counter REFRESH OPTIVE eyedrops for dry eyes.    Again, thank you very much for coming.  Please continue to take care of yourself, and please continue to pray for our recovery from this pandemic.  I am eager to see you next week to make sure that you are getting stronger.    Please continue to avail of HOME CARE.  I will fill out the proper paperwork and ask for a /nurse to make sure that you are able to take care of your  ACTIVITIES of DAILY LIVING.  See you in about 10 days.  Take care and God bless.            0  Return in 10 days.  20 minutes please.  Reassess debility, coordinate with physical therapy, Occupational Therapy, home care.  Reassess mood, energy, function, preventive strategies, cardiovascular risk.  Coordinate with GI, other specialists, as patient is seen.  Review history of hypercoagulability.            0

## 2025-07-28 NOTE — PROGRESS NOTES
Subjective   Patient ID: Leola Jennings is a 70 y.o. female who presents for Hospital Follow-up.    HPI     Review of Systems    Objective   /73 (BP Location: Left arm, Patient Position: Sitting, BP Cuff Size: Adult)   Pulse 89   Ht (!) 1.524 m (5')   Wt 57.5 kg (126 lb 11.2 oz)   LMP  (LMP Unknown)   SpO2 98%   BMI 24.74 kg/m²     Physical Exam    Assessment/Plan   Diagnoses and all orders for this visit:  Acute blood loss anemia  -     CBC and Auto Differential; Future  Iron deficiency anemia, unspecified iron deficiency anemia type  -     CBC and Auto Differential; Future  Ulcerative rectosigmoiditis with rectal bleeding (Multi)  -     CBC and Auto Differential; Future  Hypercoagulable state (Multi)  -     CBC and Auto Differential; Future  Cellulitis of both feet  -     CBC and Auto Differential; Future  Protein malnutrition (Multi)  Acute pancreatitis, unspecified complication status, unspecified pancreatitis type (HHS-HCC)  -     Comprehensive Metabolic Panel; Future  -     Magnesium; Future  -     Lipase; Future  Prediabetes  -     Comprehensive Metabolic Panel; Future  Mixed hyperlipidemia  -     Comprehensive Metabolic Panel; Future  [place] cardiac risk <10% in next 10 years  -     Comprehensive Metabolic Panel; Future  Vitamin D deficiency  Night sweats  Keratitis sicca, bilateral  Calcium oxalate crystals in urine  Acute cystitis without hematuria  -     Urinalysis with Reflex Culture and Microscopic; Future  Other orders  -     Follow Up In Primary Care - Established; Future       Thank you very much for coming.  I am very happy to see you again.    I do understand that you have had trouble with your legs and FEET, and that they are still painful.  You did just finish ANTIBIOTICS.  I am glad that you tolerated your regimens.  Please call me sooner if your legs become red and warm and painful.  You may need another bout of antibiotics.    Until then, let us wait a week and let your  medications settle into your system.  We can then repeat NONFASTING BLOOD and URINE examinations, then see me soon after, so that we can review how you are doing.    With your history of DIARRHEA and ANTIBIOTIC USE, it would be very helpful for you to be on a PROBIOTIC.  Could you please get yourself some ALIGN CAPSULE with LUNCH?  You can get this the cheapest from [employer] or [employer].  Please take this with LUNCH every day.  This will help your immune system get better, and it will also slow down your diarrhea eventually.    Please keep your appointments with GI, Dr. Cha.  He has been very helpful!    Let me study your chart tonight and see if you need more treatment regarding the BLOOD CLOTS that you developed.    With your history of NIGHT SWEATS, let us reevaluate how you are doing in about 10 days, and if needed, there is a medication that we can try for NIGHT SWEATS.  I even have samples.  Let us wait until you return, and if symptoms are persistent, then we should treat this with a medication called VEOZAH.  I have samples that we can try.    Get yourself some over-the-counter REFRESH OPTIVE eyedrops for dry eyes.    Again, thank you very much for coming.  Please continue to take care of yourself, and please continue to pray for our recovery from this pandemic.  I am eager to see you next week to make sure that you are getting stronger.    Please continue to avail of HOME CARE.  I will fill out the proper paperwork and ask for a /nurse to make sure that you are able to take care of your ACTIVITIES of DAILY LIVING.  See you in about 10 days.  Take care and God bless.            0  Return in 10 days.  20 minutes please.  Reassess debility, coordinate with physical therapy, Occupational Therapy, home care.  Reassess mood, energy, function, preventive strategies, cardiovascular risk.  Coordinate with GI, other specialists, as patient is seen.  Review history of  hypercoagulability.            0

## 2025-07-31 ENCOUNTER — TELEPHONE (OUTPATIENT)
Dept: VASCULAR SURGERY | Facility: HOSPITAL | Age: 70
End: 2025-07-31
Payer: MEDICARE

## 2025-07-31 ENCOUNTER — APPOINTMENT (OUTPATIENT)
Dept: VASCULAR SURGERY | Facility: CLINIC | Age: 70
End: 2025-07-31
Payer: MEDICARE

## 2025-07-31 ENCOUNTER — TELEPHONE (OUTPATIENT)
Dept: GASTROENTEROLOGY | Facility: CLINIC | Age: 70
End: 2025-07-31
Payer: MEDICARE

## 2025-07-31 NOTE — TELEPHONE ENCOUNTER
Patient's PCP advised her to start taking Align probiotic.   She is checking to make sure that it will not interfere with her Entyvio treatment.

## 2025-07-31 NOTE — TELEPHONE ENCOUNTER
Please let her know that it is safe to do.  There is likely no benefit in doing so.     Emanuel Cha MD

## 2025-07-31 NOTE — TELEPHONE ENCOUNTER
I have attempted to contact    Mrs. Bishop  . There is no answer at the following phone number    [phone]   . I have left a voice mail message for the patient to contact Dr. Humphreys's  office nurse at [phone]   I am  returning  the  patient's phone call .  Niharika Farias RN BSN

## 2025-08-01 ENCOUNTER — TELEPHONE (OUTPATIENT)
Dept: PRIMARY CARE | Facility: CLINIC | Age: 70
End: 2025-08-01
Payer: MEDICARE

## 2025-08-05 ENCOUNTER — PATIENT OUTREACH (OUTPATIENT)
Dept: PRIMARY CARE | Facility: CLINIC | Age: 70
End: 2025-08-05
Payer: MEDICARE

## 2025-08-05 LAB
ALBUMIN SERPL-MCNC: 3.7 G/DL (ref 3.6–5.1)
ALP SERPL-CCNC: 62 U/L (ref 37–153)
ALT SERPL-CCNC: 19 U/L (ref 6–29)
ANION GAP SERPL CALCULATED.4IONS-SCNC: 11 MMOL/L (CALC) (ref 7–17)
APPEARANCE UR: CLEAR
AST SERPL-CCNC: 11 U/L (ref 10–35)
BACTERIA #/AREA URNS HPF: ABNORMAL /HPF
BACTERIA UR CULT: ABNORMAL
BASOPHILS # BLD AUTO: 25 CELLS/UL (ref 0–200)
BASOPHILS NFR BLD AUTO: 0.2 %
BILIRUB SERPL-MCNC: 0.4 MG/DL (ref 0.2–1.2)
BILIRUB UR QL STRIP: NEGATIVE
BUN SERPL-MCNC: 15 MG/DL (ref 7–25)
CALCIUM SERPL-MCNC: 8.5 MG/DL (ref 8.6–10.4)
CHLORIDE SERPL-SCNC: 104 MMOL/L (ref 98–110)
CO2 SERPL-SCNC: 23 MMOL/L (ref 20–32)
COLOR UR: YELLOW
CREAT SERPL-MCNC: 0.67 MG/DL (ref 0.6–1)
EGFRCR SERPLBLD CKD-EPI 2021: 93 ML/MIN/1.73M2
EOSINOPHIL # BLD AUTO: 13 CELLS/UL (ref 15–500)
EOSINOPHIL NFR BLD AUTO: 0.1 %
ERYTHROCYTE [DISTWIDTH] IN BLOOD BY AUTOMATED COUNT: 18.5 % (ref 11–15)
GLUCOSE SERPL-MCNC: 122 MG/DL (ref 65–139)
GLUCOSE UR QL STRIP: NEGATIVE
HCT VFR BLD AUTO: 26.6 % (ref 35–45)
HGB BLD-MCNC: 7.5 G/DL (ref 11.7–15.5)
HGB UR QL STRIP: NEGATIVE
HYALINE CASTS #/AREA URNS LPF: ABNORMAL /LPF
KETONES UR QL STRIP: NEGATIVE
LEUKOCYTE ESTERASE UR QL STRIP: NEGATIVE
LIPASE SERPL-CCNC: 94 U/L (ref 7–60)
LYMPHOCYTES # BLD AUTO: 2400 CELLS/UL (ref 850–3900)
LYMPHOCYTES NFR BLD AUTO: 18.9 %
MAGNESIUM SERPL-MCNC: 2.2 MG/DL (ref 1.5–2.5)
MCH RBC QN AUTO: 22.4 PG (ref 27–33)
MCHC RBC AUTO-ENTMCNC: 28.2 G/DL (ref 32–36)
MCV RBC AUTO: 79.4 FL (ref 80–100)
MONOCYTES # BLD AUTO: 368 CELLS/UL (ref 200–950)
MONOCYTES NFR BLD AUTO: 2.9 %
NEUTROPHILS # BLD AUTO: 9893 CELLS/UL (ref 1500–7800)
NEUTROPHILS NFR BLD AUTO: 77.9 %
NITRITE UR QL STRIP: NEGATIVE
PH UR STRIP: 6.5 [PH] (ref 5–8)
PLATELET # BLD AUTO: 458 THOUSAND/UL (ref 140–400)
PMV BLD REES-ECKER: 8.3 FL (ref 7.5–12.5)
POTASSIUM SERPL-SCNC: 4.4 MMOL/L (ref 3.5–5.3)
PROT SERPL-MCNC: 6.2 G/DL (ref 6.1–8.1)
PROT UR QL STRIP: NEGATIVE
RBC # BLD AUTO: 3.35 MILLION/UL (ref 3.8–5.1)
RBC #/AREA URNS HPF: ABNORMAL /HPF
SERVICE CMNT-IMP: ABNORMAL
SERVICE CMNT-IMP: ABNORMAL
SODIUM SERPL-SCNC: 138 MMOL/L (ref 135–146)
SP GR UR STRIP: 1 (ref 1–1.03)
SQUAMOUS #/AREA URNS HPF: ABNORMAL /HPF
WBC # BLD AUTO: 12.7 THOUSAND/UL (ref 3.8–10.8)
WBC #/AREA URNS HPF: ABNORMAL /HPF

## 2025-08-05 NOTE — PROGRESS NOTES
Confirmation of at least 2 patient identifiers.    Completed telephonic follow-up with patient after recent visit with Dr Zavala    Spoke to patient during outreach call.    Patient reports feeling: Back to baseline    Patient has questions or concerns about medications: No    Have all prescribed medications been filled? Yes    Patient has necessary resources to manage their care? Yes    Patient has questions or concerns? No    Next care management follow-up approximately within one month.  Care  information provided to patient.

## 2025-08-07 ENCOUNTER — OFFICE VISIT (OUTPATIENT)
Dept: PRIMARY CARE | Facility: CLINIC | Age: 70
End: 2025-08-07
Payer: MEDICARE

## 2025-08-07 VITALS
HEIGHT: 60 IN | DIASTOLIC BLOOD PRESSURE: 80 MMHG | WEIGHT: 126 LBS | HEART RATE: 84 BPM | BODY MASS INDEX: 24.74 KG/M2 | OXYGEN SATURATION: 99 % | SYSTOLIC BLOOD PRESSURE: 141 MMHG

## 2025-08-07 DIAGNOSIS — Z91.89 FRAMINGHAM CARDIAC RISK <10% IN NEXT 10 YEARS: ICD-10-CM

## 2025-08-07 DIAGNOSIS — K51.311 ULCERATIVE RECTOSIGMOIDITIS WITH RECTAL BLEEDING (MULTI): ICD-10-CM

## 2025-08-07 DIAGNOSIS — F17.210 CIGARETTE NICOTINE DEPENDENCE WITHOUT COMPLICATION: ICD-10-CM

## 2025-08-07 DIAGNOSIS — R60.0 BILATERAL LEG EDEMA: Primary | ICD-10-CM

## 2025-08-07 DIAGNOSIS — D50.9 IRON DEFICIENCY ANEMIA, UNSPECIFIED IRON DEFICIENCY ANEMIA TYPE: ICD-10-CM

## 2025-08-07 DIAGNOSIS — D68.59 HYPERCOAGULABLE STATE (MULTI): ICD-10-CM

## 2025-08-07 DIAGNOSIS — E46 PROTEIN MALNUTRITION (MULTI): ICD-10-CM

## 2025-08-07 DIAGNOSIS — E78.2 MIXED HYPERLIPIDEMIA: ICD-10-CM

## 2025-08-07 DIAGNOSIS — R61 NIGHT SWEATS: ICD-10-CM

## 2025-08-07 DIAGNOSIS — R73.03 PREDIABETES: ICD-10-CM

## 2025-08-07 DIAGNOSIS — R03.0 BLOOD PRESSURE ELEVATED WITHOUT HISTORY OF HTN: ICD-10-CM

## 2025-08-07 DIAGNOSIS — K85.90 ACUTE PANCREATITIS, UNSPECIFIED COMPLICATION STATUS, UNSPECIFIED PANCREATITIS TYPE (HHS-HCC): ICD-10-CM

## 2025-08-07 DIAGNOSIS — E55.9 VITAMIN D DEFICIENCY: ICD-10-CM

## 2025-08-07 DIAGNOSIS — H16.223 KERATITIS SICCA, BILATERAL: ICD-10-CM

## 2025-08-07 PROCEDURE — G2211 COMPLEX E/M VISIT ADD ON: HCPCS | Performed by: INTERNAL MEDICINE

## 2025-08-07 PROCEDURE — 99215 OFFICE O/P EST HI 40 MIN: CPT | Performed by: INTERNAL MEDICINE

## 2025-08-07 PROCEDURE — 3008F BODY MASS INDEX DOCD: CPT | Performed by: INTERNAL MEDICINE

## 2025-08-07 NOTE — PATIENT INSTRUCTIONS
Thank very much for coming.  It is very nice to see you again.    The SWELLING of your FEET and ankles are related to your PROTEIN STORES being low.  Because of this, the fluid in your blood vessels, they tend to leak out through small pores in your blood vessels.  For this, the treatment is to avoid excessive salt, and to keep your legs elevated whenever possible.    You will also benefit from COMPRESSION HOSE or stockings.  Please put them on before getting out of bed, and if possible, just keep them on throughout the day and night.  Of course, you will have to remove them if they become uncomfortable, or if you need to take a shower.    Soon, you will be able to take an protein and not lose it from your gut once you are intestines respond to your Entyvio regimens!    Until then, please continue eating sensibly and enjoy high-protein meals.  [place] diet book, DASH diet, Mediterranean diet for ideas please.    Currently, you do not have any signs of CELLULITIS affecting your legs.  They are not particularly tender, warm, and they are not red.  There is no break in the skin, and there is no discharge from your skin and soft tissues.    Your WHITE CELL COUNT is mildly elevated, but this is not from infection, and instead, this is from use of PREDNISONE.    If you do develop FEVER 100.5 °F or higher, please let me know.  Of course, if you develop burning when you urinate, or cough, let me know as well.    With this in mind, I do not find any infection that should stop you from getting your GI treatment care of Dr. Cha.  Of course, he will have the final say, and he will evaluate you before he start you on your treatment.    In the meantime, go ahead and take ZENPEP 60 units and see if 1 capsule by mouth before meals is enough to control your diarrhea.  Please take it at least 15 minutes before a meal.  Thank you for taking the ALIGN CAPSULE PROBIOTIC with LUNCH every day.  Please continue to do so.    Please  get in touch with your OPHTHALMOLOGIST or eye specialist.  He will let you know if more needs to be done regarding your DRY EYES.  Until then, using Refresh eyedrops will alleviate some of your discomfort.  Please continue to wear dark glasses and protect yourself from light.    When you return, we will discuss DRENCHING NIGHT SWEATS if they still persist.    For your cramping, make sure that you stay hydrated.    Again, thank you very much for coming.  Please continue to take care of yourself and each other, and please continue to pray for our recovery from this pandemic.  See you in 2 months.  I will reevaluate you, and if needed, we can check laboratory examinations during your visit.  Again, take care and God bless.            0  Return in 2 months.  40 minutes please.  Reassess debility.  Coordinate with GI, ophthalmology.  Reassess history of hypercoagulability, involvement of eyes, drenching night sweats, in relation to history of inflammatory bowel disease.  Consider referral to nephrology.  Review preventive strategies, cardiovascular risk, mood, energy, function, debility.  Watch out for caregiver stress of brother.            0

## 2025-08-07 NOTE — PROGRESS NOTES
Subjective   Patient ID: Leola Jennings is a 70 y.o. female who presents for Follow-up (10 day follow up.).    HPI     Review of Systems    Objective   /80 (BP Location: Left arm, Patient Position: Sitting, BP Cuff Size: Adult)   Pulse 84   Ht (!) 1.524 m (5')   Wt 57.2 kg (126 lb)   LMP  (LMP Unknown)   SpO2 99%   BMI 24.61 kg/m²     Physical Exam    Assessment/Plan   Diagnoses and all orders for this visit:  Bilateral leg edema  -     Follow Up In Primary Care - [facility]; Future  Protein malnutrition (Multi)  -     Follow Up In Primary Care [facility]; Future  Acute pancreatitis, unspecified complication status, unspecified pancreatitis type (HHS-HCC)  -     Follow Up In Primary Care [facility]; Future  Ulcerative rectosigmoiditis with rectal bleeding (Multi)  -     Follow Up In Primary Care [facility]; Future  Iron deficiency anemia, unspecified iron deficiency anemia type  -     Follow Up In Primary Care [facility]; Future  Night sweats  -     Follow Up In Primary Care [facility]; Future  Hypercoagulable state (Multi)  -     Follow Up In Primary Care [facility]; Future  Blood pressure elevated without history of HTN  -     Follow Up In Primary Care [facility]; Future  Prediabetes  -     Follow Up In Primary Care [facility]; Future  Mixed hyperlipidemia  -     Follow Up In Primary Care [facility]; Future  Cigarette nicotine dependence without complication  -     Follow Up In Primary Care [facility]; Future  [place] cardiac risk <10% in next 10 years  -     Follow Up In Primary Care [facility]; Future  Vitamin D deficiency  -     Follow Up In Primary Care [facility]; Future  Keratitis sicca, bilateral  -     Follow Up In Primary Care [facility]; Future  Other orders  -     Follow Up In Primary Care [facility]       Thank very much for coming.  It is very nice to see you again.    The SWELLING of your FEET and ankles are related to your PROTEIN STORES  being low.  Because of this, the fluid in your blood vessels, they tend to leak out through small pores in your blood vessels.  For this, the treatment is to avoid excessive salt, and to keep your legs elevated whenever possible.    You will also benefit from COMPRESSION HOSE or stockings.  Please put them on before getting out of bed, and if possible, just keep them on throughout the day and night.  Of course, you will have to remove them if they become uncomfortable, or if you need to take a shower.    Soon, you will be able to take an protein and not lose it from your gut once you are intestines respond to your Entyvio regimens!    Until then, please continue eating sensibly and enjoy high-protein meals.  [employer] diet book, DASH diet, Mediterranean diet for ideas please.    Currently, you do not have any signs of CELLULITIS affecting your legs.  They are not particularly tender, warm, and they are not red.  There is no break in the skin, and there is no discharge from your skin and soft tissues.    Your WHITE CELL COUNT is mildly elevated, but this is not from infection, and instead, this is from use of PREDNISONE.    If you do develop FEVER 100.5 °F or higher, please let me know.  Of course, if you develop burning when you urinate, or cough, let me know as well.    With this in mind, I do not find any infection that should stop you from getting your GI treatment care of Dr. Cha.  Of course, he will have the final say, and he will evaluate you before he start you on your treatment.    In the meantime, go ahead and take ZENPEP 60 units and see if 1 capsule by mouth before meals is enough to control your diarrhea.  Please take it at least 15 minutes before a meal.  Thank you for taking the ALIGN CAPSULE PROBIOTIC with LUNCH every day.  Please continue to do so.    Please get in touch with your OPHTHALMOLOGIST or eye specialist.  He will let you know if more needs to be done regarding your DRY EYES.  Until  then, using Refresh eyedrops will alleviate some of your discomfort.  Please continue to wear dark glasses and protect yourself from light.    When you return, we will discuss DRENCHING NIGHT SWEATS if they still persist.    For your cramping, make sure that you stay hydrated.    Again, thank you very much for coming.  Please continue to take care of yourself and each other, and please continue to pray for our recovery from this pandemic.  See you in 2 months.  I will reevaluate you, and if needed, we can check laboratory examinations during your visit.  Again, take care and God bless.            0  Return in 2 months.  40 minutes please.  Reassess debility.  Coordinate with GI, ophthalmology.  Reassess history of hypercoagulability, involvement of eyes, drenching night sweats, in relation to history of inflammatory bowel disease.  Consider referral to nephrology.  Review preventive strategies, cardiovascular risk, mood, energy, function, debility.  Watch out for caregiver stress of brother.            0  When patient returns, reassess history of smoking, any persistence, any need for nicotine replacement.            0

## 2025-08-08 ENCOUNTER — APPOINTMENT (OUTPATIENT)
Dept: PRIMARY CARE | Facility: CLINIC | Age: 70
End: 2025-08-08
Payer: MEDICARE

## 2025-08-12 ENCOUNTER — INFUSION (OUTPATIENT)
Dept: HEMATOLOGY/ONCOLOGY | Facility: CLINIC | Age: 70
End: 2025-08-12
Payer: MEDICARE

## 2025-08-12 VITALS
WEIGHT: 125 LBS | OXYGEN SATURATION: 98 % | HEART RATE: 97 BPM | RESPIRATION RATE: 18 BRPM | SYSTOLIC BLOOD PRESSURE: 153 MMHG | HEIGHT: 60 IN | DIASTOLIC BLOOD PRESSURE: 67 MMHG | TEMPERATURE: 99.1 F | BODY MASS INDEX: 24.54 KG/M2

## 2025-08-12 DIAGNOSIS — K51.311 ULCERATIVE RECTOSIGMOIDITIS WITH RECTAL BLEEDING (MULTI): ICD-10-CM

## 2025-08-12 PROCEDURE — 96365 THER/PROPH/DIAG IV INF INIT: CPT | Mod: INF

## 2025-08-12 PROCEDURE — 2500000004 HC RX 250 GENERAL PHARMACY W/ HCPCS (ALT 636 FOR OP/ED): Performed by: INTERNAL MEDICINE

## 2025-08-12 RX ORDER — EPINEPHRINE 0.3 MG/.3ML
0.3 INJECTION SUBCUTANEOUS EVERY 5 MIN PRN
OUTPATIENT
Start: 2025-08-26

## 2025-08-12 RX ORDER — FAMOTIDINE 10 MG/ML
20 INJECTION, SOLUTION INTRAVENOUS ONCE AS NEEDED
OUTPATIENT
Start: 2025-08-26

## 2025-08-12 RX ORDER — DIPHENHYDRAMINE HYDROCHLORIDE 50 MG/ML
50 INJECTION, SOLUTION INTRAMUSCULAR; INTRAVENOUS AS NEEDED
OUTPATIENT
Start: 2025-08-26

## 2025-08-12 RX ORDER — ALBUTEROL SULFATE 0.83 MG/ML
3 SOLUTION RESPIRATORY (INHALATION) AS NEEDED
OUTPATIENT
Start: 2025-08-26

## 2025-08-12 RX ADMIN — VEDOLIZUMAB 300 MG: 300 INJECTION, POWDER, LYOPHILIZED, FOR SOLUTION INTRAVENOUS at 14:17

## 2025-08-12 ASSESSMENT — PAIN SCALES - GENERAL: PAINLEVEL_OUTOF10: 0-NO PAIN

## 2025-08-21 ENCOUNTER — PATIENT OUTREACH (OUTPATIENT)
Dept: PRIMARY CARE | Facility: CLINIC | Age: 70
End: 2025-08-21
Payer: MEDICARE

## 2025-08-26 ENCOUNTER — APPOINTMENT (OUTPATIENT)
Dept: GASTROENTEROLOGY | Facility: CLINIC | Age: 70
End: 2025-08-26
Payer: MEDICARE

## 2025-08-26 VITALS
HEART RATE: 93 BPM | OXYGEN SATURATION: 98 % | SYSTOLIC BLOOD PRESSURE: 150 MMHG | DIASTOLIC BLOOD PRESSURE: 88 MMHG | WEIGHT: 139 LBS | HEIGHT: 60 IN | BODY MASS INDEX: 27.29 KG/M2

## 2025-08-26 DIAGNOSIS — K51.90 ULCERATIVE COLITIS WITHOUT COMPLICATIONS, UNSPECIFIED LOCATION (MULTI): Primary | ICD-10-CM

## 2025-08-26 PROCEDURE — 99214 OFFICE O/P EST MOD 30 MIN: CPT | Performed by: INTERNAL MEDICINE

## 2025-08-26 PROCEDURE — 3008F BODY MASS INDEX DOCD: CPT | Performed by: INTERNAL MEDICINE

## 2025-08-26 PROCEDURE — 1159F MED LIST DOCD IN RCRD: CPT | Performed by: INTERNAL MEDICINE

## 2025-08-28 ENCOUNTER — INFUSION (OUTPATIENT)
Dept: HEMATOLOGY/ONCOLOGY | Facility: CLINIC | Age: 70
End: 2025-08-28
Payer: MEDICARE

## 2025-08-28 VITALS
TEMPERATURE: 98.4 F | WEIGHT: 139 LBS | HEART RATE: 86 BPM | HEIGHT: 60 IN | BODY MASS INDEX: 27.29 KG/M2 | OXYGEN SATURATION: 100 % | SYSTOLIC BLOOD PRESSURE: 140 MMHG | DIASTOLIC BLOOD PRESSURE: 79 MMHG | RESPIRATION RATE: 18 BRPM

## 2025-08-28 DIAGNOSIS — K51.311 ULCERATIVE RECTOSIGMOIDITIS WITH RECTAL BLEEDING (MULTI): ICD-10-CM

## 2025-08-28 PROCEDURE — 96365 THER/PROPH/DIAG IV INF INIT: CPT | Mod: INF

## 2025-08-28 PROCEDURE — 2500000004 HC RX 250 GENERAL PHARMACY W/ HCPCS (ALT 636 FOR OP/ED): Mod: JZ | Performed by: INTERNAL MEDICINE

## 2025-08-28 RX ORDER — FAMOTIDINE 10 MG/ML
20 INJECTION, SOLUTION INTRAVENOUS ONCE AS NEEDED
OUTPATIENT
Start: 2025-09-09

## 2025-08-28 RX ORDER — EPINEPHRINE 0.3 MG/.3ML
0.3 INJECTION SUBCUTANEOUS EVERY 5 MIN PRN
OUTPATIENT
Start: 2025-09-09

## 2025-08-28 RX ORDER — ALBUTEROL SULFATE 0.83 MG/ML
3 SOLUTION RESPIRATORY (INHALATION) AS NEEDED
OUTPATIENT
Start: 2025-09-09

## 2025-08-28 RX ORDER — DIPHENHYDRAMINE HYDROCHLORIDE 50 MG/ML
50 INJECTION, SOLUTION INTRAMUSCULAR; INTRAVENOUS AS NEEDED
OUTPATIENT
Start: 2025-09-09

## 2025-08-28 RX ADMIN — VEDOLIZUMAB 300 MG: 300 INJECTION, POWDER, LYOPHILIZED, FOR SOLUTION INTRAVENOUS at 11:34

## 2025-08-28 ASSESSMENT — PAIN SCALES - GENERAL: PAINLEVEL_OUTOF10: 0-NO PAIN

## 2025-10-09 ENCOUNTER — APPOINTMENT (OUTPATIENT)
Dept: PRIMARY CARE | Facility: CLINIC | Age: 70
End: 2025-10-09
Payer: MEDICARE

## 2025-12-02 ENCOUNTER — APPOINTMENT (OUTPATIENT)
Dept: GASTROENTEROLOGY | Facility: CLINIC | Age: 70
End: 2025-12-02
Payer: MEDICARE

## (undated) DEVICE — ACCESS KIT, S-MAK MINI, 5FR 10CM 0.018IN 40CM, SS/SS, ECHO ENHANCE NEEDLE

## (undated) DEVICE — BANDAGE, QUIKCLOT, INTERVENTIONAL HEMO, W/O SLIT

## (undated) DEVICE — TUBING, MANIFOLD, LOW PRESSURE